# Patient Record
Sex: MALE | Race: WHITE | NOT HISPANIC OR LATINO | Employment: OTHER | URBAN - METROPOLITAN AREA
[De-identification: names, ages, dates, MRNs, and addresses within clinical notes are randomized per-mention and may not be internally consistent; named-entity substitution may affect disease eponyms.]

---

## 2019-08-14 ENCOUNTER — HOSPITAL ENCOUNTER (OUTPATIENT)
Dept: RADIOLOGY | Facility: MEDICAL CENTER | Age: 69
End: 2019-08-14

## 2019-08-14 ENCOUNTER — HOSPITAL ENCOUNTER (INPATIENT)
Facility: MEDICAL CENTER | Age: 69
LOS: 5 days | DRG: 617 | End: 2019-08-19
Attending: EMERGENCY MEDICINE | Admitting: HOSPITALIST
Payer: MEDICARE

## 2019-08-14 DIAGNOSIS — L08.9 LEFT FOOT INFECTION: ICD-10-CM

## 2019-08-14 DIAGNOSIS — L02.91 ABSCESS: ICD-10-CM

## 2019-08-14 PROCEDURE — 36415 COLL VENOUS BLD VENIPUNCTURE: CPT

## 2019-08-14 PROCEDURE — 96365 THER/PROPH/DIAG IV INF INIT: CPT

## 2019-08-14 PROCEDURE — 770006 HCHG ROOM/CARE - MED/SURG/GYN SEMI*

## 2019-08-14 PROCEDURE — 700111 HCHG RX REV CODE 636 W/ 250 OVERRIDE (IP): Performed by: EMERGENCY MEDICINE

## 2019-08-14 PROCEDURE — 99358 PROLONG SERVICE W/O CONTACT: CPT | Performed by: HOSPITALIST

## 2019-08-14 PROCEDURE — 99285 EMERGENCY DEPT VISIT HI MDM: CPT

## 2019-08-14 PROCEDURE — 700105 HCHG RX REV CODE 258: Performed by: EMERGENCY MEDICINE

## 2019-08-14 PROCEDURE — 99223 1ST HOSP IP/OBS HIGH 75: CPT | Mod: AI,25 | Performed by: HOSPITALIST

## 2019-08-14 RX ORDER — SODIUM CHLORIDE 9 MG/ML
1000 INJECTION, SOLUTION INTRAVENOUS ONCE
Status: COMPLETED | OUTPATIENT
Start: 2019-08-14 | End: 2019-08-14

## 2019-08-14 RX ADMIN — VANCOMYCIN HYDROCHLORIDE 2300 MG: 500 INJECTION, POWDER, LYOPHILIZED, FOR SOLUTION INTRAVENOUS at 23:56

## 2019-08-14 RX ADMIN — SODIUM CHLORIDE 1000 ML: 9 INJECTION, SOLUTION INTRAVENOUS at 23:55

## 2019-08-14 SDOH — HEALTH STABILITY: MENTAL HEALTH: HOW OFTEN DO YOU HAVE A DRINK CONTAINING ALCOHOL?: 4 OR MORE TIMES A WEEK

## 2019-08-14 SDOH — HEALTH STABILITY: MENTAL HEALTH: HOW MANY STANDARD DRINKS CONTAINING ALCOHOL DO YOU HAVE ON A TYPICAL DAY?: 1 OR 2

## 2019-08-15 ENCOUNTER — APPOINTMENT (OUTPATIENT)
Dept: RADIOLOGY | Facility: MEDICAL CENTER | Age: 69
DRG: 617 | End: 2019-08-15
Attending: HOSPITALIST
Payer: MEDICARE

## 2019-08-15 PROBLEM — E11.9 DIABETES TYPE 2, CONTROLLED (HCC): Status: ACTIVE | Noted: 2019-08-15

## 2019-08-15 PROBLEM — L08.9 LEFT FOOT INFECTION: Status: ACTIVE | Noted: 2019-08-15

## 2019-08-15 PROBLEM — E87.1 HYPONATREMIA: Status: ACTIVE | Noted: 2019-08-15

## 2019-08-15 PROBLEM — F10.10 ALCOHOL ABUSE: Status: ACTIVE | Noted: 2019-08-15

## 2019-08-15 PROBLEM — I10 HTN (HYPERTENSION): Status: ACTIVE | Noted: 2019-08-15

## 2019-08-15 LAB
ANION GAP SERPL CALC-SCNC: 9 MMOL/L (ref 0–11.9)
BUN SERPL-MCNC: 14 MG/DL (ref 8–22)
CALCIUM SERPL-MCNC: 8.7 MG/DL (ref 8.5–10.5)
CHLORIDE SERPL-SCNC: 89 MMOL/L (ref 96–112)
CO2 SERPL-SCNC: 29 MMOL/L (ref 20–33)
CREAT SERPL-MCNC: 0.85 MG/DL (ref 0.5–1.4)
ERYTHROCYTE [SEDIMENTATION RATE] IN BLOOD BY WESTERGREN METHOD: 57 MM/HOUR (ref 0–20)
EST. AVERAGE GLUCOSE BLD GHB EST-MCNC: 154 MG/DL
GLUCOSE BLD-MCNC: 180 MG/DL (ref 65–99)
GLUCOSE BLD-MCNC: 77 MG/DL (ref 65–99)
GLUCOSE SERPL-MCNC: 120 MG/DL (ref 65–99)
HBA1C MFR BLD: 7 % (ref 0–5.6)
OSMOLALITY SERPL: 257 MOSM/KG H2O (ref 278–298)
OSMOLALITY UR: 278 MOSM/KG H2O (ref 300–900)
POTASSIUM SERPL-SCNC: 3.4 MMOL/L (ref 3.6–5.5)
SODIUM SERPL-SCNC: 127 MMOL/L (ref 135–145)

## 2019-08-15 PROCEDURE — 80048 BASIC METABOLIC PNL TOTAL CA: CPT

## 2019-08-15 PROCEDURE — 83930 ASSAY OF BLOOD OSMOLALITY: CPT

## 2019-08-15 PROCEDURE — 83935 ASSAY OF URINE OSMOLALITY: CPT

## 2019-08-15 PROCEDURE — A9270 NON-COVERED ITEM OR SERVICE: HCPCS | Performed by: HOSPITALIST

## 2019-08-15 PROCEDURE — 99232 SBSQ HOSP IP/OBS MODERATE 35: CPT | Performed by: INTERNAL MEDICINE

## 2019-08-15 PROCEDURE — 700111 HCHG RX REV CODE 636 W/ 250 OVERRIDE (IP): Performed by: INTERNAL MEDICINE

## 2019-08-15 PROCEDURE — 36415 COLL VENOUS BLD VENIPUNCTURE: CPT

## 2019-08-15 PROCEDURE — 700105 HCHG RX REV CODE 258: Performed by: HOSPITALIST

## 2019-08-15 PROCEDURE — 700117 HCHG RX CONTRAST REV CODE 255: Performed by: INTERNAL MEDICINE

## 2019-08-15 PROCEDURE — 85652 RBC SED RATE AUTOMATED: CPT

## 2019-08-15 PROCEDURE — 700111 HCHG RX REV CODE 636 W/ 250 OVERRIDE (IP): Performed by: HOSPITALIST

## 2019-08-15 PROCEDURE — 82962 GLUCOSE BLOOD TEST: CPT

## 2019-08-15 PROCEDURE — 99223 1ST HOSP IP/OBS HIGH 75: CPT | Performed by: INTERNAL MEDICINE

## 2019-08-15 PROCEDURE — 700102 HCHG RX REV CODE 250 W/ 637 OVERRIDE(OP): Performed by: INTERNAL MEDICINE

## 2019-08-15 PROCEDURE — 700102 HCHG RX REV CODE 250 W/ 637 OVERRIDE(OP): Performed by: HOSPITALIST

## 2019-08-15 PROCEDURE — A9270 NON-COVERED ITEM OR SERVICE: HCPCS | Performed by: INTERNAL MEDICINE

## 2019-08-15 PROCEDURE — 770006 HCHG ROOM/CARE - MED/SURG/GYN SEMI*

## 2019-08-15 PROCEDURE — 700105 HCHG RX REV CODE 258: Performed by: INTERNAL MEDICINE

## 2019-08-15 PROCEDURE — 83036 HEMOGLOBIN GLYCOSYLATED A1C: CPT

## 2019-08-15 PROCEDURE — 73720 MRI LWR EXTREMITY W/O&W/DYE: CPT | Mod: LT

## 2019-08-15 RX ORDER — ACETAMINOPHEN 500 MG
1000 TABLET ORAL EVERY 6 HOURS
Status: DISCONTINUED | OUTPATIENT
Start: 2019-08-15 | End: 2019-08-19 | Stop reason: HOSPADM

## 2019-08-15 RX ORDER — LORAZEPAM 1 MG/1
3 TABLET ORAL
Status: DISCONTINUED | OUTPATIENT
Start: 2019-08-15 | End: 2019-08-17

## 2019-08-15 RX ORDER — LORAZEPAM 2 MG/ML
2 INJECTION INTRAMUSCULAR
Status: DISCONTINUED | OUTPATIENT
Start: 2019-08-15 | End: 2019-08-17

## 2019-08-15 RX ORDER — AMOXICILLIN 250 MG
2 CAPSULE ORAL 2 TIMES DAILY
Status: DISCONTINUED | OUTPATIENT
Start: 2019-08-15 | End: 2019-08-19 | Stop reason: HOSPADM

## 2019-08-15 RX ORDER — BISACODYL 10 MG
10 SUPPOSITORY, RECTAL RECTAL
Status: DISCONTINUED | OUTPATIENT
Start: 2019-08-15 | End: 2019-08-19 | Stop reason: HOSPADM

## 2019-08-15 RX ORDER — OXYCODONE HYDROCHLORIDE 5 MG/1
5 TABLET ORAL
Status: DISCONTINUED | OUTPATIENT
Start: 2019-08-15 | End: 2019-08-19 | Stop reason: HOSPADM

## 2019-08-15 RX ORDER — TRIAMTERENE CAPSULES 50 MG/1
50 CAPSULE ORAL DAILY
Status: DISCONTINUED | OUTPATIENT
Start: 2019-08-15 | End: 2019-08-15

## 2019-08-15 RX ORDER — OXYCODONE HYDROCHLORIDE 10 MG/1
10 TABLET ORAL
Status: DISCONTINUED | OUTPATIENT
Start: 2019-08-15 | End: 2019-08-19 | Stop reason: HOSPADM

## 2019-08-15 RX ORDER — LORAZEPAM 2 MG/ML
0.5 INJECTION INTRAMUSCULAR EVERY 4 HOURS PRN
Status: DISCONTINUED | OUTPATIENT
Start: 2019-08-15 | End: 2019-08-17

## 2019-08-15 RX ORDER — ONDANSETRON 4 MG/1
4 TABLET, ORALLY DISINTEGRATING ORAL EVERY 4 HOURS PRN
Status: DISCONTINUED | OUTPATIENT
Start: 2019-08-15 | End: 2019-08-19 | Stop reason: HOSPADM

## 2019-08-15 RX ORDER — LORAZEPAM 2 MG/ML
1 INJECTION INTRAMUSCULAR
Status: DISCONTINUED | OUTPATIENT
Start: 2019-08-15 | End: 2019-08-17

## 2019-08-15 RX ORDER — LORAZEPAM 2 MG/ML
1.5 INJECTION INTRAMUSCULAR
Status: DISCONTINUED | OUTPATIENT
Start: 2019-08-15 | End: 2019-08-17

## 2019-08-15 RX ORDER — TRIAMTERENE CAPSULES 50 MG/1
50 CAPSULE ORAL DAILY
Status: ON HOLD | COMMUNITY
End: 2019-08-19

## 2019-08-15 RX ORDER — MORPHINE SULFATE 4 MG/ML
4 INJECTION, SOLUTION INTRAMUSCULAR; INTRAVENOUS
Status: DISCONTINUED | OUTPATIENT
Start: 2019-08-15 | End: 2019-08-19 | Stop reason: HOSPADM

## 2019-08-15 RX ORDER — LORAZEPAM 0.5 MG/1
0.5 TABLET ORAL EVERY 4 HOURS PRN
Status: DISCONTINUED | OUTPATIENT
Start: 2019-08-15 | End: 2019-08-17

## 2019-08-15 RX ORDER — FOLIC ACID 1 MG/1
1 TABLET ORAL DAILY
Status: DISPENSED | OUTPATIENT
Start: 2019-08-15 | End: 2019-08-19

## 2019-08-15 RX ORDER — SODIUM CHLORIDE 9 MG/ML
2000 INJECTION, SOLUTION INTRAVENOUS ONCE
Status: DISCONTINUED | OUTPATIENT
Start: 2019-08-15 | End: 2019-08-15

## 2019-08-15 RX ORDER — HEPARIN SODIUM 5000 [USP'U]/ML
5000 INJECTION, SOLUTION INTRAVENOUS; SUBCUTANEOUS EVERY 8 HOURS
Status: DISCONTINUED | OUTPATIENT
Start: 2019-08-15 | End: 2019-08-19 | Stop reason: HOSPADM

## 2019-08-15 RX ORDER — POLYETHYLENE GLYCOL 3350 17 G/17G
1 POWDER, FOR SOLUTION ORAL
Status: DISCONTINUED | OUTPATIENT
Start: 2019-08-15 | End: 2019-08-19 | Stop reason: HOSPADM

## 2019-08-15 RX ORDER — SODIUM CHLORIDE 9 MG/ML
500 INJECTION, SOLUTION INTRAVENOUS
Status: DISCONTINUED | OUTPATIENT
Start: 2019-08-15 | End: 2019-08-19 | Stop reason: HOSPADM

## 2019-08-15 RX ORDER — THIAMINE MONONITRATE (VIT B1) 100 MG
100 TABLET ORAL DAILY
Status: DISPENSED | OUTPATIENT
Start: 2019-08-15 | End: 2019-08-19

## 2019-08-15 RX ORDER — ONDANSETRON 2 MG/ML
4 INJECTION INTRAMUSCULAR; INTRAVENOUS EVERY 4 HOURS PRN
Status: DISCONTINUED | OUTPATIENT
Start: 2019-08-15 | End: 2019-08-19 | Stop reason: HOSPADM

## 2019-08-15 RX ORDER — LORAZEPAM 1 MG/1
2 TABLET ORAL
Status: DISCONTINUED | OUTPATIENT
Start: 2019-08-15 | End: 2019-08-17

## 2019-08-15 RX ORDER — LORAZEPAM 1 MG/1
1 TABLET ORAL EVERY 4 HOURS PRN
Status: DISCONTINUED | OUTPATIENT
Start: 2019-08-15 | End: 2019-08-17

## 2019-08-15 RX ORDER — LORAZEPAM 1 MG/1
4 TABLET ORAL
Status: DISCONTINUED | OUTPATIENT
Start: 2019-08-15 | End: 2019-08-17

## 2019-08-15 RX ORDER — SODIUM CHLORIDE AND POTASSIUM CHLORIDE 300; 900 MG/100ML; MG/100ML
INJECTION, SOLUTION INTRAVENOUS CONTINUOUS
Status: DISCONTINUED | OUTPATIENT
Start: 2019-08-15 | End: 2019-08-15

## 2019-08-15 RX ADMIN — HEPARIN SODIUM 5000 UNITS: 5000 INJECTION, SOLUTION INTRAVENOUS; SUBCUTANEOUS at 22:42

## 2019-08-15 RX ADMIN — AMPICILLIN SODIUM AND SULBACTAM SODIUM 3 G: 2; 1 INJECTION, POWDER, FOR SOLUTION INTRAMUSCULAR; INTRAVENOUS at 04:54

## 2019-08-15 RX ADMIN — FOLIC ACID 1 MG: 1 TABLET ORAL at 12:49

## 2019-08-15 RX ADMIN — HEPARIN SODIUM 5000 UNITS: 5000 INJECTION, SOLUTION INTRAVENOUS; SUBCUTANEOUS at 04:55

## 2019-08-15 RX ADMIN — HEPARIN SODIUM 5000 UNITS: 5000 INJECTION, SOLUTION INTRAVENOUS; SUBCUTANEOUS at 12:49

## 2019-08-15 RX ADMIN — GADOTERIDOL 20 ML: 279.3 INJECTION, SOLUTION INTRAVENOUS at 10:43

## 2019-08-15 RX ADMIN — Medication 100 MG: at 12:49

## 2019-08-15 RX ADMIN — ACETAMINOPHEN 1000 MG: 500 TABLET ORAL at 22:42

## 2019-08-15 RX ADMIN — ACETAMINOPHEN 1000 MG: 500 TABLET ORAL at 12:49

## 2019-08-15 RX ADMIN — INSULIN HUMAN 1 UNITS: 100 INJECTION, SOLUTION PARENTERAL at 22:44

## 2019-08-15 RX ADMIN — VANCOMYCIN HYDROCHLORIDE 1400 MG: 500 INJECTION, POWDER, LYOPHILIZED, FOR SOLUTION INTRAVENOUS at 18:33

## 2019-08-15 RX ADMIN — AMPICILLIN SODIUM AND SULBACTAM SODIUM 3 G: 2; 1 INJECTION, POWDER, FOR SOLUTION INTRAMUSCULAR; INTRAVENOUS at 22:42

## 2019-08-15 RX ADMIN — AMPICILLIN SODIUM AND SULBACTAM SODIUM 3 G: 2; 1 INJECTION, POWDER, FOR SOLUTION INTRAMUSCULAR; INTRAVENOUS at 17:50

## 2019-08-15 RX ADMIN — TRIAMTERENE 50 MG: 50 CAPSULE ORAL at 04:55

## 2019-08-15 RX ADMIN — ACETAMINOPHEN 1000 MG: 500 TABLET ORAL at 17:49

## 2019-08-15 RX ADMIN — AMPICILLIN SODIUM AND SULBACTAM SODIUM 3 G: 2; 1 INJECTION, POWDER, FOR SOLUTION INTRAMUSCULAR; INTRAVENOUS at 14:10

## 2019-08-15 RX ADMIN — THERA TABS 1 TABLET: TAB at 12:49

## 2019-08-15 ASSESSMENT — LIFESTYLE VARIABLES
CONSUMPTION TOTAL: POSITIVE
NAUSEA AND VOMITING: NO NAUSEA AND NO VOMITING
TOTAL SCORE: 1
HAVE PEOPLE ANNOYED YOU BY CRITICIZING YOUR DRINKING: NO
AUDITORY DISTURBANCES: NOT PRESENT
PAROXYSMAL SWEATS: NO SWEAT VISIBLE
SUBSTANCE_ABUSE: 0
ORIENTATION AND CLOUDING OF SENSORIUM: ORIENTED AND CAN DO SERIAL ADDITIONS
TREMOR: NO TREMOR
TREMOR: *
TOTAL SCORE: 0
AGITATION: NORMAL ACTIVITY
AUDITORY DISTURBANCES: NOT PRESENT
EVER FELT BAD OR GUILTY ABOUT YOUR DRINKING: NO
EVER_SMOKED: NEVER
AGITATION: NORMAL ACTIVITY
HOW MANY TIMES IN THE PAST YEAR HAVE YOU HAD 5 OR MORE DRINKS IN A DAY: 300
DOES PATIENT WANT TO STOP DRINKING: NO
PAROXYSMAL SWEATS: NO SWEAT VISIBLE
EVER HAD A DRINK FIRST THING IN THE MORNING TO STEADY YOUR NERVES TO GET RID OF A HANGOVER: NO
TREMOR: NO TREMOR
NAUSEA AND VOMITING: NO NAUSEA AND NO VOMITING
HEADACHE, FULLNESS IN HEAD: NOT PRESENT
NAUSEA AND VOMITING: NO NAUSEA AND NO VOMITING
ANXIETY: NO ANXIETY (AT EASE)
ALCOHOL_USE: YES
ANXIETY: NO ANXIETY (AT EASE)
AGITATION: NORMAL ACTIVITY
ANXIETY: NO ANXIETY (AT EASE)
HAVE YOU EVER FELT YOU SHOULD CUT DOWN ON YOUR DRINKING: YES
VISUAL DISTURBANCES: NOT PRESENT
HEADACHE, FULLNESS IN HEAD: NOT PRESENT
HEADACHE, FULLNESS IN HEAD: NOT PRESENT
PAROXYSMAL SWEATS: NO SWEAT VISIBLE
TOTAL SCORE: 3
ORIENTATION AND CLOUDING OF SENSORIUM: ORIENTED AND CAN DO SERIAL ADDITIONS
TOTAL SCORE: 0
ON A TYPICAL DAY WHEN YOU DRINK ALCOHOL HOW MANY DRINKS DO YOU HAVE: 10
ORIENTATION AND CLOUDING OF SENSORIUM: ORIENTED AND CAN DO SERIAL ADDITIONS
AUDITORY DISTURBANCES: NOT PRESENT
VISUAL DISTURBANCES: NOT PRESENT
AVERAGE NUMBER OF DAYS PER WEEK YOU HAVE A DRINK CONTAINING ALCOHOL: 7
VISUAL DISTURBANCES: NOT PRESENT
TOTAL SCORE: 1
TOTAL SCORE: 1

## 2019-08-15 ASSESSMENT — ENCOUNTER SYMPTOMS
NECK PAIN: 0
HEARTBURN: 0
TREMORS: 0
PALPITATIONS: 0
MYALGIAS: 1
BLURRED VISION: 0
FEVER: 0
SHORTNESS OF BREATH: 0
BRUISES/BLEEDS EASILY: 0
DIARRHEA: 0
HALLUCINATIONS: 0
DEPRESSION: 0
FLANK PAIN: 0
SPEECH CHANGE: 0
CHILLS: 0
VOMITING: 0
WHEEZING: 0
HEADACHES: 0
TREMORS: 1
DIZZINESS: 0
FOCAL WEAKNESS: 0
ORTHOPNEA: 0
SENSORY CHANGE: 1
BACK PAIN: 0
HEMOPTYSIS: 0
SPUTUM PRODUCTION: 0
MYALGIAS: 0
DOUBLE VISION: 0
WEIGHT LOSS: 0
NERVOUS/ANXIOUS: 0
ABDOMINAL PAIN: 0
COUGH: 0
SORE THROAT: 0
PHOTOPHOBIA: 0
TINGLING: 0
NAUSEA: 0
POLYDIPSIA: 0

## 2019-08-15 ASSESSMENT — COGNITIVE AND FUNCTIONAL STATUS - GENERAL
DAILY ACTIVITIY SCORE: 24
SUGGESTED CMS G CODE MODIFIER DAILY ACTIVITY: CH
SUGGESTED CMS G CODE MODIFIER MOBILITY: CJ
CLIMB 3 TO 5 STEPS WITH RAILING: A LITTLE
MOBILITY SCORE: 22
WALKING IN HOSPITAL ROOM: A LITTLE

## 2019-08-15 ASSESSMENT — PATIENT HEALTH QUESTIONNAIRE - PHQ9
2. FEELING DOWN, DEPRESSED, IRRITABLE, OR HOPELESS: NOT AT ALL
SUM OF ALL RESPONSES TO PHQ9 QUESTIONS 1 AND 2: 0
1. LITTLE INTEREST OR PLEASURE IN DOING THINGS: NOT AT ALL

## 2019-08-15 NOTE — CONSULTS
INFECTIOUS DISEASES INPATIENT CONSULT NOTE     Date of Service: 8/15/2019    Consult Requested By: Sunil Eagle M.D.    Reason for Consultation: Left foot infection    History of Present Illness:   Abdullahi Huynh is a 68 y.o. man with a history of gout, chronic hyponatremia, hypertension and ongoing alcohol abuse with lower extremity neuropathy admitted 8/14/2019 for worsening left foot edema and erythema.  Approximately 2 months prior to admission, patient stubbed his left foot in the dark.  At that time he did not seek any medical attention.  Over the last 2 weeks however, he noted development of blisters on his left great and second toe associated with increasing edema and erythema.  He also noted a fever of 101 prior to admission.  Eventually the blisters did open and drain foul-smelling fluid.  He has been treating his foot wounds with Neosporin ointment and hydrogen peroxide.  He did see his primary care physician secondary to his foot wounds.  He was prescribed narcotic medications and oral antibiotics but for unclear reasons did not have the antibiotics filled.  Given lack of improvement, he presented to the ED at a local hospital due to concerns for worsening infection.  X-ray at the [facility] revealed a dislocated middle phalanx of the second left toe but no evidence of osteomyelitis or gas formation.  He was transferred to [facility] for higher level of care and orthopedic surgery evaluation.  Of note, patient history of alcohol abuse greater than 20 years.  He admits to drinking 10-12 beers daily with last drink earlier on the day of admission.  He denies any prior alcohol withdrawals.  Suspect his neuropathy is secondary to long-standing alcohol abuse.  On presentation, he was afebrile.  Labs were not obtained.  He was started on broad-spectrum antibiotics.  MRI has been performed however final report is pending.  Infectious disease service consulted for further antibiotic  recommendations and management.      Review of Systems   Constitutional: Negative for chills and fever.   Respiratory: Negative for cough and shortness of breath.    Cardiovascular: Positive for leg swelling. Negative for chest pain.   Gastrointestinal: Negative for abdominal pain, diarrhea, nausea and vomiting.   Musculoskeletal: Positive for myalgias.   Neurological: Positive for tremors and sensory change.   All other systems reviewed and are negative.      PMH:   Past Medical History:   Diagnosis Date   • Hypertension    Alcohol abuse  Peripheral neuropathy  Gout    PSH:  Arthroscopic surgery of the knee    FAMILY HX:  Positive for alcohol abuse    SOCIAL HX:  Social History     Socioeconomic History   • Marital status:      Spouse name: Not on file   • Number of children: Not on file   • Years of education: Not on file   • Highest education level: Not on file   Occupational History   • Not on file   Social Needs   • Financial resource strain: Not on file   • Food insecurity:     Worry: Not on file     Inability: Not on file   • Transportation needs:     Medical: Not on file     Non-medical: Not on file   Tobacco Use   • Smoking status: Never Smoker   • Smokeless tobacco: Never Used   Substance and Sexual Activity   • Alcohol use: Yes     Frequency: 4 or more times a week     Drinks per session: 1 or 2   • Drug use: Never   • Sexual activity: Not on file   Lifestyle   • Physical activity:     Days per week: Not on file     Minutes per session: Not on file   • Stress: Not on file   Relationships   • Social connections:     Talks on phone: Not on file     Gets together: Not on file     Attends Muslim service: Not on file     Active member of club or organization: Not on file     Attends meetings of clubs or organizations: Not on file     Relationship status: Not on file   • Intimate partner violence:     Fear of current or ex partner: Not on file     Emotionally abused: Not on file     Physically abused:  Not on file     Forced sexual activity: Not on file   Other Topics Concern   • Not on file   Social History Narrative   • Not on file     Social History     Tobacco Use   Smoking Status Never Smoker   Smokeless Tobacco Never Used     Social History     Substance and Sexual Activity   Alcohol Use Yes   • Frequency: 4 or more times a week   • Drinks per session: 1 or 2       Allergies/Intolerances:  No Known Allergies    History reviewed with the patient    Other Current Medications:    Current Facility-Administered Medications:   •  senna-docusate (PERICOLACE or SENOKOT S) 8.6-50 MG per tablet 2 Tab, 2 Tab, Oral, BID **AND** polyethylene glycol/lytes (MIRALAX) PACKET 1 Packet, 1 Packet, Oral, QDAY PRN **AND** magnesium hydroxide (MILK OF MAGNESIA) suspension 30 mL, 30 mL, Oral, QDAY PRN **AND** bisacodyl (DULCOLAX) suppository 10 mg, 10 mg, Rectal, QDAY PRN, Nurys William M.D.  •  heparin injection 5,000 Units, 5,000 Units, Subcutaneous, Q8HRS, Nurys William M.D., 5,000 Units at 08/15/19 0455  •  ondansetron (ZOFRAN) syringe/vial injection 4 mg, 4 mg, Intravenous, Q4HRS PRN, Nurys William M.D.  •  ondansetron (ZOFRAN ODT) dispertab 4 mg, 4 mg, Oral, Q4HRS PRN, Nurys William M.D.  •  NS (BOLUS) infusion 500 mL, 500 mL, Intravenous, Once PRN, Nurys William M.D.  •  ampicillin/sulbactam (UNASYN) 3 g in  mL IVPB, 3 g, Intravenous, Q6HRS, Nurys William M.D., Stopped at 08/15/19 0524  •  MD Alert...Vancomycin per Pharmacy, 1 Each, Other, PHARMACY TO DOSE, Nurys William M.D.  •  vancomycin (VANCOCIN) 1,400 mg in  mL IVPB, 15 mg/kg, Intravenous, Q24HR, Nurys William M.D.  •  Pharmacy Consult Request ...Pain Management Review 1 Each, 1 Each, Other, PHARMACY TO DOSE, Sunil Eagle M.D.  •  acetaminophen (TYLENOL) tablet 1,000 mg, 1,000 mg, Oral, Q6HRS, Sunil Eagle M.D.  •  oxyCODONE immediate release (ROXICODONE) tablet 10 mg, 10 mg, Oral, Q3HRS PRN, Sunil Eagle M.D.  •  morphine  "(pf) 4 mg/ml injection 4 mg, 4 mg, Intravenous, Q3HRS PRN, Sunil Eagle M.D.  •  oxyCODONE immediate-release (ROXICODONE) tablet 5 mg, 5 mg, Oral, Q3HRS PRN, Sunil Eagle M.D.  •  thiamine tablet 100 mg, 100 mg, Oral, DAILY **AND** multivitamin (THERAGRAN) tablet 1 Tab, 1 Tab, Oral, DAILY **AND** folic acid (FOLVITE) tablet 1 mg, 1 mg, Oral, DAILY, Sunil Eagle M.D.  •  LORazepam (ATIVAN) tablet 0.5 mg, 0.5 mg, Oral, Q4HRS PRN, Sunil Eagle M.D.  •  LORazepam (ATIVAN) tablet 1 mg, 1 mg, Oral, Q4HRS PRN **OR** LORazepam (ATIVAN) injection 0.5 mg, 0.5 mg, Intravenous, Q4HRS PRN, Sunil Eagle M.D.  •  LORazepam (ATIVAN) tablet 2 mg, 2 mg, Oral, Q2HRS PRN **OR** LORazepam (ATIVAN) injection 1 mg, 1 mg, Intravenous, Q2HRS PRN, Sunil Egale M.D.  •  LORazepam (ATIVAN) tablet 3 mg, 3 mg, Oral, Q HOUR PRN **OR** LORazepam (ATIVAN) injection 1.5 mg, 1.5 mg, Intravenous, Q HOUR PRN, Sunil Eagle M.D.  •  LORazepam (ATIVAN) tablet 4 mg, 4 mg, Oral, Q15 MIN PRN **OR** LORazepam (ATIVAN) injection 2 mg, 2 mg, Intravenous, Q15 MIN PRN, Sunil Eagle M.D.  [unfilled]    Most Recent Vital Signs:  /65   Pulse 76   Temp 37.2 °C (99 °F) (Temporal)   Resp 18   Ht 1.88 m (6' 2\")   Wt 90.7 kg (200 lb)   SpO2 97%   BMI 25.68 kg/m²   Temp  Av.9 °C (98.5 °F)  Min: 36.4 °C (97.5 °F)  Max: 37.2 °C (99 °F)    Physical Exam:  General: Older than stated age, well nourished, diaphoretic, well-appearing, no acute distress  HEENT: sclera anicteric, PERRL, extraocular muscles intact, mucous membranes moist, oropharynx clear and moist, no oral lesions or exudate  Neck: supple, no lymphadenopathy  Chest: CTAB, no rales, rhonchi or wheezes, normal work of breathing.  Cardiac: regular rate and rhythm, normal S1 S2, no murmurs, rubs or gallops  Abdomen: + bowel sounds, soft, non-tender, non-distended, no hepatosplenomegaly  Extremities: Left foot edema extending proximally surrounded by " erythema.  There is a ulcer in the webspace between first and second toes of the left foot.  Left second toe is edematous with the presence of an ulcer on the medial aspect. + Scant foul-smelling drainage  Skin: See above  Neuro: Alert and oriented times 3, non-focal exam, speech fluent, full range of motion to bilateral upper and lower extremities; mild bilateral hand tremors  Psych: normal mood and behavior, pleasant; memory intact, normal judgement    Pertinent Lab Results:  No results for input(s): WBC, HGB in the last 72 hours.    Invalid input(s): PLATELET, ABSOLUTENEUT           Recent Labs     08/15/19  0555   SODIUM 127*   POTASSIUM 3.4*   CHLORIDE 89*   CO2 29   CREATININE 0.85        No results for input(s): ALBUMIN in the last 72 hours.    Invalid input(s): AST, ALT, ALKPHOS, BILITOT, TOTALBILIRUB, BILIRUBINTOT, BILIRUBINDIR, BILIRUBININD, ALKALINEPHOS     Pertinent Micro:  Results     ** No results found for the last 168 hours. **        No results found for: BLOODCULTU, BLDCULT, BCHOLD     Studies:  No results found.    IMPRESSION:   1.  Left foot infection    2.  Left lower extremity cellulitis  3.  Type 2 diabetes mellitus, newly diagnosed  4.  Peripheral neuropathy  5.  Alcohol abuse      PLAN:   Abdullahi Huynh is a 68 y.o. male with a history of long-standing alcohol abuse complicated by peripheral neuropathy and newly diagnosed type 2 diabetes mellitus admitted for nonhealing wounds of the left foot and worsening edema and erythema of the left lower extremity.  Patient has clinical manifestations consistent with soft tissue infection of the left lower extremity.  Patient states that a wound culture was obtained at the outside hospital.  Please obtain these cultures to help guide antibiotic therapy.  MRI has been performed to rule out underlying osteomyelitis.  Check ESR. Agree with vancomycin and Unasyn for now.  Monitor renal function and Vanco trough.  Patient had no prior diagnosis of  type 2 diabetes.  Will need diabetes education.  Control blood sugars to help control current infection and promote wound healing.  Will likely need evaluation from the limb preservation service.  Will defer need for surgical intervention to LPS.  Further recommendations per MRI and culture results.      Plan of care discussed with PARISA Eagle M.D.. Will continue to follow    Luh Singh M.D.      Please note that this dictation was created using voice recognition software. I have worked with technical experts from [facility] to optimize the interface.  I have made every reasonable attempt to correct obvious errors, but there may be errors of grammar and possibly content that I did not discover before finalizing the note.

## 2019-08-15 NOTE — PROGRESS NOTES
2 RN skin check complete светлана   Devices in place iv.  Skin assessed under devices yes  Confirmed pressure ulcers found on left between 2nd toe.  New potential pressure ulcers noted on yes Wound consult placed yes    The following interventions in place cleaned with NS , foam between the toes and wrapped with kerlix  Arm /hand bruising noted

## 2019-08-15 NOTE — THERAPY
PT eval order received. Pending LPS consult to determine POC and precautions in regard to foot. Will defer eval until this has been established.

## 2019-08-15 NOTE — CARE PLAN
Problem: Bowel/Gastric:  Goal: Will not experience complications related to bowel motility  Outcome: PROGRESSING AS EXPECTED  Intervention: Assess baseline bowel pattern  Note:   Pt states his bowel movements have not changed. Pt states that he goes daily.      Problem: Knowledge Deficit  Goal: Knowledge of disease process/condition, treatment plan, diagnostic tests, and medications will improve  Outcome: PROGRESSING AS EXPECTED  Intervention: Assess knowledge level of disease process/condition, treatment plan, diagnostic tests, and medications  Note:   Pt is aware of disease process and the plan to develop a treatment plan.      Problem: Discharge Barriers/Planning  Goal: Patient's continuum of care needs will be met  Outcome: PROGRESSING AS EXPECTED

## 2019-08-15 NOTE — ED TRIAGE NOTES
Pt BIB [facility] as transfer from [place]. Pt presented to the ED with complaints of tow pain after stubbing his toe a few weeks ago.Pt states that he went to his PCP yesterday for increase in swelling, Pt states he was prescribed an antibiotic but pharmacy was unable to fill. Pt states that this morning he noticed that the sweeling had increased substantially and he had the addition of fever of 101.5. Per [facility] pt had lab work done at outside facility along with onw dose of 2g Rocephin. Pt denies any medical hx.

## 2019-08-15 NOTE — ED NOTES
Med rec updated and complete. Allergies reviewed.  Pt denies antibiotic use in last 14 days.  Home pharmacy [facility].

## 2019-08-15 NOTE — H&P
Hospital Medicine History & Physical Note    Date of Service  8/14/2019    Primary Care Physician  Jose Batista M.D.    Consultants  Pending    Code Status  Full    Chief Complaint  Chief Complaint   Patient presents with   • Toe Pain   • Wound Infection       History of Presenting Illness  68 y.o. male who presented on 8/14/2019 in transfer from outside facility with left foot and toe wound.  The patient presented to an outside hospital with complaints of a left second toe and foot infection.  He states that the toe has been red and swollen for several weeks but worsened in the past night and was associated with fatigue.  He was seen by his primary care provider who started him on oral antibiotic therapy but he apparently did not get this filled.  He states that all of his issues began at least 2 months ago when he stubbed his toe but did not seek immediate medical assistance.  Approximately 2 weeks ago, he developed blisters and drainage around that area.  He reports a history of hypertension and consumes up to a 12 pack of beer per day.  He also carries a history of gout and chronic hyponatremia.    At the outside facility, work-up including WBC 15.8 hemoglobin 12.3 hematocrit 35.7 platelet 263 sodium 11 potassium 3.3 chloride 78 CO2 24 BUN 22 Prandin 1.2 glucose 174 CRP 18.7 ESR 66.  Plain film of the left toes showed a middle phalanx of the second toe which is dislocated dorsally, no obvious fracture or specific evidence of osteomyelitis.  No gas formation.  He was transferred to our facility for higher level of care and specialist consultation.    Review of Systems  Review of Systems   Constitutional: Negative for chills and fever.   HENT: Negative for congestion and sore throat.    Eyes: Negative for photophobia.   Respiratory: Negative for cough, shortness of breath and wheezing.    Cardiovascular: Negative for chest pain and palpitations.   Gastrointestinal: Negative for abdominal pain, diarrhea, nausea  and vomiting.   Genitourinary: Negative for dysuria.   Musculoskeletal: Negative for myalgias.   Skin: Negative.         Left foot, second toe and left shin redness and swelling.   Neurological: Negative for dizziness, tingling, focal weakness and headaches.   Psychiatric/Behavioral: Negative for depression and suicidal ideas.       Past Medical History  Past Medical History:   Diagnosis Date   • Hypertension        Surgical History  Patient denies    Family History  History reviewed. No pertinent family history.    Social History  Social History     Tobacco Use   • Smoking status: Never Smoker   • Smokeless tobacco: Never Used   Substance Use Topics   • Alcohol use: Yes     Frequency: 4 or more times a week     Drinks per session: 1 or 2   • Drug use: Never       Allergies  Not on File    Medications  No current facility-administered medications on file prior to encounter.      No current outpatient medications on file prior to encounter.       Physical Exam  Hemodynamics  Temp (24hrs), Av.2 °C (98.9 °F), Min:37.2 °C (98.9 °F), Max:37.2 °C (98.9 °F)   Temperature: 37.2 °C (98.9 °F)  Pulse  Av.5  Min: 89  Max: 110    Blood Pressure : 139/77      Respiratory      Respiration: 14, Pulse Oximetry: 97 %             Physical Exam   Constitutional: He is oriented to person, place, and time. No distress.   HENT:   Head: Normocephalic and atraumatic.   Right Ear: External ear normal.   Left Ear: External ear normal.   Eyes: EOM are normal. Right eye exhibits no discharge. Left eye exhibits no discharge.   Neck: Neck supple. No JVD present.   Cardiovascular: Normal rate, regular rhythm and normal heart sounds.   Pulmonary/Chest: Effort normal and breath sounds normal. No respiratory distress. He exhibits no tenderness.   Abdominal: Soft. Bowel sounds are normal. He exhibits no distension. There is no tenderness.   Musculoskeletal: He exhibits no edema.   Neurological: He is alert and oriented to person, place, and  time. No cranial nerve deficit.   Skin: Skin is dry. He is not diaphoretic. No erythema.   Patient's left second toe is swollen, skin is sloughing and macerated.  He has overlying erythema and swelling on the dorsum of his left foot which extends to the left anterior shin.   Psychiatric: He has a normal mood and affect. His behavior is normal.   Nursing note and vitals reviewed.    Capillary refill less than 3 seconds, distal pulses intact    Laboratory:          No results for input(s): ALTSGPT, ASTSGOT, ALKPHOSPHAT, TBILIRUBIN, DBILIRUBIN, GAMMAGT, AMYLASE, LIPASE, ALB, PREALBUMIN, GLUCOSE in the last 72 hours.              No results found for: TROPONINI    Imaging  No results found.      Assessment/Plan:  Anticipate that patient will need greater than 2 midnights for management of the discussed medical issues.    * Left foot infection  Assessment & Plan  Patient has clear evidence of overlying cellulitis of the left anterior shin as well as the dorsum of the left foot.  The second left toe is also swollen and erythematous.  Plain film from the outer facility shows no observable fracture and indicates that there is no evidence of osteomyelitis or gas formation, however given the chronicity of his injury, underlying osteomyelitis is of concern.  We will check an MRI of the left foot for better visualization.  I have requested a limb preservation service consultation and I have started him on both vancomycin and Unasyn for empiric antibiotic treatment.  In the morning, we will plan to consult orthopedic surgery as well as infectious disease.    Alcohol abuse  Assessment & Plan  The patient reports drinking up to a 12 pack of beer per day, he states that he was admitted to an outside hospital last month and it did not drink for 3 days straight without any evidence of alcohol withdrawal.  At present, he is stable, no tremors, and asymptomatic from the standpoint with stable vital signs.  I have offered him beer with  all of his meals as he states that he is not ready to quit.  However he has declined this, we will continue to monitor closely for any evidence of withdrawal.    Hyponatremia  Assessment & Plan  By report, this is a chronic issue for this patient.  His mental status is stable.  He does drink a 12 pack of beer per day therefore I suspect this is underlying beer potomania.  I will check serum and urine osmolality levels.    HTN (hypertension)  Assessment & Plan  Resume home Dyazide.      Prophylaxis: Heparin for DVT prophylaxis, no PPI indicated, bowel protocol as needed    I spent a total of 35 minutes of non face to face time performing additional research, reviewing medical records from transferring facility, discussing plan of care with other healthcare providers. Start time: 11:20PM. End time: 11:55PM.

## 2019-08-15 NOTE — ASSESSMENT & PLAN NOTE
The patient reports drinking up to a 12 pack of beer per day, history of alcohol withdrawal  Start on CIWA protocol  Monitor for withdrawal, no evidence of withdrawal  Vitamins  Fall, aspiration, seizure precautions

## 2019-08-15 NOTE — ASSESSMENT & PLAN NOTE
Acute on chronic.  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  Also potassium sparing diuretics contributes  Sodium increased from 115-127 in the course of 24 hours his IV fluids  Asymptomatic  Monitor  8/16 sodium 133  8/17 stable  8/18 mild stable asymptomatic

## 2019-08-15 NOTE — PROGRESS NOTES
"Pharmacy Kinetics 68 y.o. male on vancomycin day # 1 8/15/2019    New vancomycin start  Provider specified end date: 6 weeks    Indication for Treatment: osteomyelitis    Pertinent history per medical record: Admitted on 2019 for osteomyelitis work up. Patient stubbed toe two months ago. Did not seek treatment at time. Now has abscess and imaging concerning for osteomyelitis. Sent from OSH for evaluation. Received 2g ceftriaxone prior to arrival.     Other antibiotics: unasyn 3g IV q6h    Allergies: Patient has no known allergies.     List concerns for renal function: BUN:SCr > 20:1, electrolyte derangements    Pertinent cultures to date:   Unknown collection at OSH.   No cultures collected here.     MRSA nares swab if pneumonia is a concern: n/a    No results for input(s): WBC, NEUTSPOLYS, BANDSSTABS in the last 72 hours.  No results for input(s): BUN, CREATININE, ALBUMIN in the last 72 hours.  No results for input(s): VANCOTROUGH, VANCOPEAK, VANCORANDOM in the last 72 hours.No intake or output data in the 24 hours ending 08/15/19 0107   /73   Pulse 80   Temp 37.2 °C (98.9 °F) (Temporal)   Resp 19   Ht 1.88 m (6' 2\")   Wt 90.7 kg (200 lb)   SpO2 97%  Temp (24hrs), Av.2 °C (98.9 °F), Min:37.2 °C (98.9 °F), Max:37.2 °C (98.9 °F)      A/P   1. Vancomycin dose change: start 15 mg/kg (1400 mg) IV q24h  2. Next vancomycin level: 2 days (not ordered)  3. Goal trough: 16-20 mcg/mL  4. Comments: Empiric dosing for osteomyelitis. Recommend orthopedics consult for source control. Recommend following for improvement in renal function, may require BID dosing to achieve concentrations if renal indices improve. Pharmacy will follow for possible de-escalation.     Panda Joseph, PharmD        "

## 2019-08-15 NOTE — PROGRESS NOTES
Admit from the ER via gurney. Alert x4 but [place], built in bed alarm placed for low fall risk but balance is sometimes difficult due to his left 2nd toe; red/edema,open wounds, slight weeping- await MRI. [place] to room, call light within reach and visual checks through the night

## 2019-08-15 NOTE — ASSESSMENT & PLAN NOTE
Resume home Dyazide.  Will discontinue triamterene due to tendency to hyponatremia  Blood pressure is stable

## 2019-08-15 NOTE — ASSESSMENT & PLAN NOTE
Started on Unasyn and vancomycin, will continue  ID consulted, appreciate recommendation  Will need to follow with wound culture report from outside facility  MRI of the left foot showed osteomyelitis and abscess of the left second toe  Orthopedic surgery consulted  8/16 amputation of second toe  Follow-up with surgical culture  Continue antibiotics per ID  8/18 status post I&D and wound closure  Surgical culture growing staph epidermidis.  Patient is to heel weightbearing on the left foot.  Might need assistive devices  for safe discharge?  Will order PT OT

## 2019-08-15 NOTE — ED PROVIDER NOTES
ED Provider Note    CHIEF COMPLAINT  Chief Complaint   Patient presents with   • Toe Pain   • Wound Infection       HPI  Abdullahi Huynh is a 68 y.o. male who presents with a fever and toe pain.  The patient states he stubbed his left second toe approximately 2 months ago.  He states he did not think anything of the injury did not seek medical care.  About 2 weeks ago the patient noticed a blister in the distal aspect of the left second toe.  Subsequently this opened up and started draining fluid.  Over the last 24 to 48 hours he started developing a fever as well as some erythema expanding from the toe up into the foot as well as the ankle.  The patient presented to the emerge department in [facility] and was transferred here emergently as the patient was found to have a dislocation of the DIP joint of the left second toe as well as significant cellulitis.  The patient states he does not have diabetes.  He states he does drink approximately 12 beers a day.  He does have hypertension.    REVIEW OF SYSTEMS  See HPI for further details. All other systems are negative.     PAST MEDICAL HISTORY  Past Medical History:   Diagnosis Date   • Hypertension        FAMILY HISTORY  [unfilled]    SOCIAL HISTORY  Social History     Socioeconomic History   • Marital status:      Spouse name: Not on file   • Number of children: Not on file   • Years of education: Not on file   • Highest education level: Not on file   Occupational History   • Not on file   Social Needs   • Financial resource strain: Not on file   • Food insecurity:     Worry: Not on file     Inability: Not on file   • Transportation needs:     Medical: Not on file     Non-medical: Not on file   Tobacco Use   • Smoking status: Never Smoker   • Smokeless tobacco: Never Used   Substance and Sexual Activity   • Alcohol use: Yes     Frequency: 4 or more times a week     Drinks per session: 1 or 2   • Drug use: Never   • Sexual activity: Not on file  "  Lifestyle   • Physical activity:     Days per week: Not on file     Minutes per session: Not on file   • Stress: Not on file   Relationships   • Social connections:     Talks on phone: Not on file     Gets together: Not on file     Attends [employer] service: Not on file     Active member of club or organization: Not on file     Attends meetings of clubs or organizations: Not on file     Relationship status: Not on file   • Intimate partner violence:     Fear of current or ex partner: Not on file     Emotionally abused: Not on file     Physically abused: Not on file     Forced sexual activity: Not on file   Other Topics Concern   • Not on file   Social History Narrative   • Not on file       SURGICAL HISTORY  No past surgical history on file.    CURRENT MEDICATIONS  Home Medications     Reviewed by Asmita Vazquez R.N. (Registered Nurse) on 08/14/19 at 2308  Med List Status: Partial   Medication Last Dose Status        Patient [place] Taking any Medications                       ALLERGIES  Not on File    PHYSICAL EXAM  VITAL SIGNS: /56   Pulse (!) 110   Temp 37.2 °C (98.9 °F) (Temporal)   Resp 14   Ht 1.88 m (6' 2\")   Wt 90.7 kg (200 lb)   BMI 25.68 kg/m²  Room air O2: 94    Constitutional: Chronically ill in appearance  HENT: Normocephalic, Atraumatic, Bilateral external ears normal, Oropharynx moist, No oral exudates, Nose normal.   Eyes: PERRLA, EOMI, Conjunctiva normal, No discharge.   Neck: Normal range of motion, No tenderness, Supple, No stridor.   Lymphatic: No lymphadenopathy noted.   Cardiovascular: Tachycardic heart rate, Normal rhythm, No murmurs, No rubs, No gallops.   Thorax & Lungs: Normal breath sounds, No respiratory distress, No wheezing, No chest tenderness.   Abdomen: Bowel sounds normal, Soft, No tenderness, No masses, No pulsatile masses.   Skin: Open wound to the medial aspect of the left second toe lateral to the DIP joint.  The patient does have purulent drainage.  He has " significant induration and erythema of the second toe extending into the left foot and left lower extremity..   Back: No tenderness, No CVA tenderness.   Extremities: The open abscess to the left toe described above with the significant erythema.  The patient also has significant edema in the foot as well as the ankle.  The dorsalis pedis and posterior tibial pulses difficult to clinically evaluate due to the swelling in the left ankle.  The patient does have significant swelling of the left second toe, extremities otherwise intact distal pulses, No edema, No tenderness, No cyanosis, No clubbing.    Neurologic: Alert & oriented x 3, Normal motor function, Normal sensory function, No focal deficits noted.   Psychiatric: Affect normal, Judgment normal, Mood normal.     COURSE & MEDICAL DECISION MAKING  Pertinent Labs & Imaging studies reviewed. (See chart for details)  This is 68-year-old gentleman who was transferred in for evaluation of possible bacteremia from a cellulitis due to a toe wound.  I did review his x-ray and he does have a dislocation of the DIP joint of the left second toe.  This happened approximately 2 months ago when I did attempt reduction with no success in the emerge department as the patient does have significant swelling throughout the left second toe and I suspect he may require an amputation as he most likely does have a deep abscess.  The patient received Rocephin prior to transfer the patient received vancomycin.  I did review his laboratory analysis and he does have a leukocytosis.  He does not appear septic and blood cultures are pending at [facility].  The patient also has significant hyponatremia and hypochloremia.  He will receive a second liter of fluid.  The patient will need to be watched for alcohol withdrawal.  He does not appear to be toxic and he will be admitted in stable condition.    FINAL IMPRESSION  1.  Open abscess left second toe suspect osteomyelitis  2.  Fever  rule out bacteremia  3.  Left second toe dislocation in the DIP joint    Disposition  The patient will be admitted in stable condition         Electronically signed by: Rafy Ferro, 8/14/2019 11:22 PM

## 2019-08-15 NOTE — PROGRESS NOTES
Hospital Medicine Daily Progress Note    Date of Service  8/15/2019    Chief Complaint/Hospital Course  68 y.o. man with a history of gout, chronic hyponatremia, hypertension and ongoing alcohol abuse with lower extremity neuropathy admitted 8/14/2019 for worsening left foot edema and erythema.            Interval Problem Update  Patient stated left foot pain and swelling feels better after IV antibiotics and fluids started.  Fever resolved.  MRI of the foot done, report is pending  ID consulted  Sodium went up from from 115-127    Disposition  To be discussed      Consultants  Infectious disease  Outpatient    Code Status  Full code    Chief Complaint  Left foot pain and ulcer    Review of Systems  Review of Systems   Constitutional: Negative for chills, fever and weight loss.   HENT: Negative for ear pain, hearing loss and tinnitus.    Eyes: Negative for blurred vision, double vision and photophobia.   Respiratory: Negative for cough, hemoptysis and sputum production.    Cardiovascular: Negative for chest pain, palpitations and orthopnea.   Gastrointestinal: Negative for heartburn, nausea and vomiting.   Genitourinary: Negative for dysuria, flank pain, frequency and hematuria.   Musculoskeletal: Positive for joint pain. Negative for back pain and neck pain.   Skin: Negative for itching and rash.   Neurological: Negative for tremors, speech change, focal weakness and headaches.   Endo/Heme/Allergies: Negative for environmental allergies and polydipsia. Does not bruise/bleed easily.   Psychiatric/Behavioral: Negative for hallucinations and substance abuse. The patient is not nervous/anxious.         Physical Exam  Temp:  [36.4 °C (97.5 °F)-37.2 °C (99 °F)] 37.2 °C (99 °F)  Pulse:  [] 76  Resp:  [14-19] 18  BP: (119-139)/(56-77) 121/65  SpO2:  [94 %-98 %] 97 %    Physical Exam   Constitutional: He is oriented to person, place, and time. He appears well-developed and well-nourished.   HENT:   Head: Normocephalic  and atraumatic.   Eyes: Pupils are equal, round, and reactive to light. EOM are normal.   Neck: Normal range of motion. Neck supple.   Cardiovascular: Normal rate, regular rhythm and normal heart sounds.   Pulmonary/Chest: Effort normal and breath sounds normal.   Abdominal: Soft. Bowel sounds are normal.   Musculoskeletal: Normal range of motion.   Left foot edema extending proximally surrounded by erythema ; ulcer in the webspace between first and second toes of the left foot; Left second toe is edematous with the presence of an ulcer on the medial aspect.  Seropurulent discharge small amount   Neurological: He is alert and oriented to person, place, and time.   Skin: Skin is warm and dry.   Psychiatric: He has a normal mood and affect.   Nursing note and vitals reviewed.      Fluids    Intake/Output Summary (Last 24 hours) at 8/15/2019 1540  Last data filed at 8/15/2019 0900  Gross per 24 hour   Intake 400 ml   Output 1220 ml   Net -820 ml       Laboratory      Recent Labs     08/15/19  0555   SODIUM 127*   POTASSIUM 3.4*   CHLORIDE 89*   CO2 29   GLUCOSE 120*   BUN 14   CREATININE 0.85   CALCIUM 8.7                   Imaging  OUTSIDE IMAGES-DX LOWER EXTREMITY, LEFT   Final Result      MR-FOOT-WITH & W/O LEFT    (Results Pending)        Assessment/Plan  * Left foot infection  Assessment & Plan  Started on Unasyn and vancomycin, will continue  ID consulted, appreciate recommendation  Will need to follow with wound culture report from outside facility  We will follow with MRI of the left foot report and LPS recommendations    Diabetes type 2, controlled (HCC)  Assessment & Plan  A1c 7.0  Sliding scale  Diabetes education  Metformin upon discharge    Alcohol abuse  Assessment & Plan  The patient reports drinking up to a 12 pack of beer per day, history of alcohol withdrawal  Start on CIWA protocol  Monitor for withdrawal  Vitamins  Fall, aspiration, seizure precautions    Hyponatremia  Assessment & Plan  Acute on  chronic.  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  Also potassium sparing diuretics contributes  Sodium increased from 115-127 in the course of 24 hours his IV fluids  Asymptomatic  Monitor  Will refrain from sodium chloride solution at this point to avoid further overcorrection    HTN (hypertension)  Assessment & Plan  Resume home Dyazide.  Will discontinue triamterene due to tendency to hyponatremia       VTE prophylaxis: Heparin

## 2019-08-15 NOTE — PROGRESS NOTES
Pharmacy Kinetics Addendum:    68 y.o. male on vancomycin day # 1 8/15/2019    Currently on Vancomycin 1400 mg iv q24hr (2300)    Indication for Treatment: R/O Osteomyelitis    Recent Labs     08/15/19  0555   BUN 14   CREATININE 0.85     No results for input(s): VANCOTROUGH, VANCOPEAK, VANCORANDOM in the last 72 hours.    A/P   1. Vancomycin dose change: begin 1400mg (15mg/kg) q12hr (15, 03)  2. Next vancomycin level: 8/16 at 1400; prior to 4th dose; ordered  3. Goal trough: 12-16 mcg/mL  4. Comments: Renal indices appear to have normalized since admit, will increase maintenance vancomycin dosing to q12hr.  Trough to be ordered just prior to steady state. ID following.    Rupert Moe, PharmD

## 2019-08-15 NOTE — THERAPY
Occupational Therapy Contact Note    OT order received. Pt pending LPS consult for foot infection. Will hold OT eval until POC is established.     Alejandra Perez, OTR/L

## 2019-08-16 ENCOUNTER — ANESTHESIA EVENT (OUTPATIENT)
Dept: SURGERY | Facility: MEDICAL CENTER | Age: 69
DRG: 617 | End: 2019-08-16
Payer: MEDICARE

## 2019-08-16 ENCOUNTER — ANESTHESIA (OUTPATIENT)
Dept: SURGERY | Facility: MEDICAL CENTER | Age: 69
DRG: 617 | End: 2019-08-16
Payer: MEDICARE

## 2019-08-16 PROBLEM — E87.6 HYPOKALEMIA: Status: ACTIVE | Noted: 2019-08-16

## 2019-08-16 LAB
ANION GAP SERPL CALC-SCNC: 10 MMOL/L (ref 0–11.9)
ANION GAP SERPL CALC-SCNC: 9 MMOL/L (ref 0–11.9)
APTT PPP: 35.2 SEC (ref 24.7–36)
BUN SERPL-MCNC: 12 MG/DL (ref 8–22)
BUN SERPL-MCNC: 9 MG/DL (ref 8–22)
CALCIUM SERPL-MCNC: 7.8 MG/DL (ref 8.5–10.5)
CALCIUM SERPL-MCNC: 8.1 MG/DL (ref 8.5–10.5)
CHLORIDE SERPL-SCNC: 95 MMOL/L (ref 96–112)
CHLORIDE SERPL-SCNC: 96 MMOL/L (ref 96–112)
CO2 SERPL-SCNC: 27 MMOL/L (ref 20–33)
CO2 SERPL-SCNC: 30 MMOL/L (ref 20–33)
CREAT SERPL-MCNC: 0.83 MG/DL (ref 0.5–1.4)
CREAT SERPL-MCNC: 1.03 MG/DL (ref 0.5–1.4)
ERYTHROCYTE [DISTWIDTH] IN BLOOD BY AUTOMATED COUNT: 47.5 FL (ref 35.9–50)
GLUCOSE BLD-MCNC: 136 MG/DL (ref 65–99)
GLUCOSE BLD-MCNC: 89 MG/DL (ref 65–99)
GLUCOSE BLD-MCNC: 90 MG/DL (ref 65–99)
GLUCOSE SERPL-MCNC: 180 MG/DL (ref 65–99)
GLUCOSE SERPL-MCNC: 89 MG/DL (ref 65–99)
GRAM STN SPEC: NORMAL
GRAM STN SPEC: NORMAL
HCT VFR BLD AUTO: 29.7 % (ref 42–52)
HGB BLD-MCNC: 10.1 G/DL (ref 14–18)
INR PPP: 1.06 (ref 0.87–1.13)
MAGNESIUM SERPL-MCNC: 1.7 MG/DL (ref 1.5–2.5)
MCH RBC QN AUTO: 31.2 PG (ref 27–33)
MCHC RBC AUTO-ENTMCNC: 34 G/DL (ref 33.7–35.3)
MCV RBC AUTO: 91.7 FL (ref 81.4–97.8)
PHOSPHATE SERPL-MCNC: 3.6 MG/DL (ref 2.5–4.5)
PLATELET # BLD AUTO: 220 K/UL (ref 164–446)
PMV BLD AUTO: 9 FL (ref 9–12.9)
POTASSIUM SERPL-SCNC: 2.8 MMOL/L (ref 3.6–5.5)
POTASSIUM SERPL-SCNC: 2.9 MMOL/L (ref 3.6–5.5)
PROTHROMBIN TIME: 14.1 SEC (ref 12–14.6)
RBC # BLD AUTO: 3.24 M/UL (ref 4.7–6.1)
SIGNIFICANT IND 70042: NORMAL
SIGNIFICANT IND 70042: NORMAL
SITE SITE: NORMAL
SITE SITE: NORMAL
SODIUM SERPL-SCNC: 133 MMOL/L (ref 135–145)
SODIUM SERPL-SCNC: 134 MMOL/L (ref 135–145)
SOURCE SOURCE: NORMAL
SOURCE SOURCE: NORMAL
WBC # BLD AUTO: 8.5 K/UL (ref 4.8–10.8)

## 2019-08-16 PROCEDURE — A9270 NON-COVERED ITEM OR SERVICE: HCPCS | Performed by: INTERNAL MEDICINE

## 2019-08-16 PROCEDURE — 87186 SC STD MICRODIL/AGAR DIL: CPT

## 2019-08-16 PROCEDURE — 700102 HCHG RX REV CODE 250 W/ 637 OVERRIDE(OP): Performed by: INTERNAL MEDICINE

## 2019-08-16 PROCEDURE — 82962 GLUCOSE BLOOD TEST: CPT | Mod: 91

## 2019-08-16 PROCEDURE — 500881 HCHG PACK, EXTREMITY: Performed by: ORTHOPAEDIC SURGERY

## 2019-08-16 PROCEDURE — 700111 HCHG RX REV CODE 636 W/ 250 OVERRIDE (IP): Performed by: INTERNAL MEDICINE

## 2019-08-16 PROCEDURE — 160048 HCHG OR STATISTICAL LEVEL 1-5: Performed by: ORTHOPAEDIC SURGERY

## 2019-08-16 PROCEDURE — 770006 HCHG ROOM/CARE - MED/SURG/GYN SEMI*

## 2019-08-16 PROCEDURE — 700101 HCHG RX REV CODE 250: Performed by: ORTHOPAEDIC SURGERY

## 2019-08-16 PROCEDURE — 160036 HCHG PACU - EA ADDL 30 MINS PHASE I: Performed by: ORTHOPAEDIC SURGERY

## 2019-08-16 PROCEDURE — 80048 BASIC METABOLIC PNL TOTAL CA: CPT

## 2019-08-16 PROCEDURE — 85027 COMPLETE CBC AUTOMATED: CPT

## 2019-08-16 PROCEDURE — 160002 HCHG RECOVERY MINUTES (STAT): Performed by: ORTHOPAEDIC SURGERY

## 2019-08-16 PROCEDURE — 501838 HCHG SUTURE GENERAL: Performed by: ORTHOPAEDIC SURGERY

## 2019-08-16 PROCEDURE — 0Y6S0Z0 DETACHMENT AT LEFT 2ND TOE, COMPLETE, OPEN APPROACH: ICD-10-PCS | Performed by: ORTHOPAEDIC SURGERY

## 2019-08-16 PROCEDURE — A6550 NEG PRES WOUND THER DRSG SET: HCPCS | Performed by: ORTHOPAEDIC SURGERY

## 2019-08-16 PROCEDURE — 501329 HCHG SET, CYSTO IRRIG Y TUR: Performed by: ORTHOPAEDIC SURGERY

## 2019-08-16 PROCEDURE — 36415 COLL VENOUS BLD VENIPUNCTURE: CPT

## 2019-08-16 PROCEDURE — 700102 HCHG RX REV CODE 250 W/ 637 OVERRIDE(OP): Performed by: HOSPITALIST

## 2019-08-16 PROCEDURE — 160009 HCHG ANES TIME/MIN: Performed by: ORTHOPAEDIC SURGERY

## 2019-08-16 PROCEDURE — 700111 HCHG RX REV CODE 636 W/ 250 OVERRIDE (IP): Performed by: HOSPITALIST

## 2019-08-16 PROCEDURE — 160039 HCHG SURGERY MINUTES - EA ADDL 1 MIN LEVEL 3: Performed by: ORTHOPAEDIC SURGERY

## 2019-08-16 PROCEDURE — 87075 CULTR BACTERIA EXCEPT BLOOD: CPT

## 2019-08-16 PROCEDURE — 85610 PROTHROMBIN TIME: CPT

## 2019-08-16 PROCEDURE — 700105 HCHG RX REV CODE 258: Performed by: INTERNAL MEDICINE

## 2019-08-16 PROCEDURE — 700111 HCHG RX REV CODE 636 W/ 250 OVERRIDE (IP): Performed by: ANESTHESIOLOGY

## 2019-08-16 PROCEDURE — 87077 CULTURE AEROBIC IDENTIFY: CPT

## 2019-08-16 PROCEDURE — 160035 HCHG PACU - 1ST 60 MINS PHASE I: Performed by: ORTHOPAEDIC SURGERY

## 2019-08-16 PROCEDURE — 700105 HCHG RX REV CODE 258: Performed by: HOSPITALIST

## 2019-08-16 PROCEDURE — 700105 HCHG RX REV CODE 258: Performed by: ANESTHESIOLOGY

## 2019-08-16 PROCEDURE — 87015 SPECIMEN INFECT AGNT CONCNTJ: CPT

## 2019-08-16 PROCEDURE — 87205 SMEAR GRAM STAIN: CPT

## 2019-08-16 PROCEDURE — 99232 SBSQ HOSP IP/OBS MODERATE 35: CPT | Performed by: INTERNAL MEDICINE

## 2019-08-16 PROCEDURE — 87070 CULTURE OTHR SPECIMN AEROBIC: CPT

## 2019-08-16 PROCEDURE — 160028 HCHG SURGERY MINUTES - 1ST 30 MINS LEVEL 3: Performed by: ORTHOPAEDIC SURGERY

## 2019-08-16 PROCEDURE — A9270 NON-COVERED ITEM OR SERVICE: HCPCS | Performed by: HOSPITALIST

## 2019-08-16 PROCEDURE — 83735 ASSAY OF MAGNESIUM: CPT

## 2019-08-16 PROCEDURE — 84100 ASSAY OF PHOSPHORUS: CPT

## 2019-08-16 PROCEDURE — 85730 THROMBOPLASTIN TIME PARTIAL: CPT

## 2019-08-16 PROCEDURE — 700101 HCHG RX REV CODE 250: Performed by: ANESTHESIOLOGY

## 2019-08-16 RX ORDER — METOPROLOL TARTRATE 1 MG/ML
1 INJECTION, SOLUTION INTRAVENOUS
Status: DISCONTINUED | OUTPATIENT
Start: 2019-08-16 | End: 2019-08-16 | Stop reason: HOSPADM

## 2019-08-16 RX ORDER — DIPHENHYDRAMINE HYDROCHLORIDE 50 MG/ML
12.5 INJECTION INTRAMUSCULAR; INTRAVENOUS
Status: DISCONTINUED | OUTPATIENT
Start: 2019-08-16 | End: 2019-08-16 | Stop reason: HOSPADM

## 2019-08-16 RX ORDER — HYDRALAZINE HYDROCHLORIDE 20 MG/ML
5 INJECTION INTRAMUSCULAR; INTRAVENOUS
Status: DISCONTINUED | OUTPATIENT
Start: 2019-08-16 | End: 2019-08-16 | Stop reason: HOSPADM

## 2019-08-16 RX ORDER — SODIUM CHLORIDE 9 MG/ML
INJECTION, SOLUTION INTRAVENOUS CONTINUOUS
Status: DISCONTINUED | OUTPATIENT
Start: 2019-08-16 | End: 2019-08-16

## 2019-08-16 RX ORDER — OXYCODONE HCL 5 MG/5 ML
10 SOLUTION, ORAL ORAL
Status: DISCONTINUED | OUTPATIENT
Start: 2019-08-16 | End: 2019-08-16 | Stop reason: HOSPADM

## 2019-08-16 RX ORDER — OXYCODONE HCL 5 MG/5 ML
5 SOLUTION, ORAL ORAL
Status: DISCONTINUED | OUTPATIENT
Start: 2019-08-16 | End: 2019-08-16 | Stop reason: HOSPADM

## 2019-08-16 RX ORDER — CEFAZOLIN SODIUM 1 G/3ML
INJECTION, POWDER, FOR SOLUTION INTRAMUSCULAR; INTRAVENOUS PRN
Status: DISCONTINUED | OUTPATIENT
Start: 2019-08-16 | End: 2019-08-16 | Stop reason: SURG

## 2019-08-16 RX ORDER — ONDANSETRON 2 MG/ML
INJECTION INTRAMUSCULAR; INTRAVENOUS PRN
Status: DISCONTINUED | OUTPATIENT
Start: 2019-08-16 | End: 2019-08-16 | Stop reason: SURG

## 2019-08-16 RX ORDER — POTASSIUM CHLORIDE 20 MEQ/1
40 TABLET, EXTENDED RELEASE ORAL
Status: COMPLETED | OUTPATIENT
Start: 2019-08-16 | End: 2019-08-16

## 2019-08-16 RX ORDER — BUPIVACAINE HYDROCHLORIDE AND EPINEPHRINE 5; 5 MG/ML; UG/ML
INJECTION, SOLUTION EPIDURAL; INTRACAUDAL; PERINEURAL
Status: DISCONTINUED | OUTPATIENT
Start: 2019-08-16 | End: 2019-08-16 | Stop reason: HOSPADM

## 2019-08-16 RX ORDER — ONDANSETRON 2 MG/ML
4 INJECTION INTRAMUSCULAR; INTRAVENOUS
Status: DISCONTINUED | OUTPATIENT
Start: 2019-08-16 | End: 2019-08-16 | Stop reason: HOSPADM

## 2019-08-16 RX ORDER — SODIUM CHLORIDE, SODIUM LACTATE, POTASSIUM CHLORIDE, CALCIUM CHLORIDE 600; 310; 30; 20 MG/100ML; MG/100ML; MG/100ML; MG/100ML
INJECTION, SOLUTION INTRAVENOUS CONTINUOUS
Status: DISCONTINUED | OUTPATIENT
Start: 2019-08-16 | End: 2019-08-16 | Stop reason: HOSPADM

## 2019-08-16 RX ORDER — POTASSIUM CHLORIDE 7.45 MG/ML
10 INJECTION INTRAVENOUS
Status: ACTIVE | OUTPATIENT
Start: 2019-08-16 | End: 2019-08-16

## 2019-08-16 RX ORDER — SODIUM CHLORIDE, SODIUM LACTATE, POTASSIUM CHLORIDE, CALCIUM CHLORIDE 600; 310; 30; 20 MG/100ML; MG/100ML; MG/100ML; MG/100ML
INJECTION, SOLUTION INTRAVENOUS
Status: DISCONTINUED | OUTPATIENT
Start: 2019-08-16 | End: 2019-08-16 | Stop reason: SURG

## 2019-08-16 RX ORDER — HALOPERIDOL 5 MG/ML
1 INJECTION INTRAMUSCULAR
Status: DISCONTINUED | OUTPATIENT
Start: 2019-08-16 | End: 2019-08-16 | Stop reason: HOSPADM

## 2019-08-16 RX ORDER — MAGNESIUM SULFATE HEPTAHYDRATE 40 MG/ML
4 INJECTION, SOLUTION INTRAVENOUS ONCE
Status: ACTIVE | OUTPATIENT
Start: 2019-08-16 | End: 2019-08-17

## 2019-08-16 RX ORDER — HYDROMORPHONE HYDROCHLORIDE 1 MG/ML
0.6 INJECTION, SOLUTION INTRAMUSCULAR; INTRAVENOUS; SUBCUTANEOUS
Status: DISCONTINUED | OUTPATIENT
Start: 2019-08-16 | End: 2019-08-16 | Stop reason: HOSPADM

## 2019-08-16 RX ORDER — HYDROMORPHONE HYDROCHLORIDE 1 MG/ML
0.4 INJECTION, SOLUTION INTRAMUSCULAR; INTRAVENOUS; SUBCUTANEOUS
Status: DISCONTINUED | OUTPATIENT
Start: 2019-08-16 | End: 2019-08-16 | Stop reason: HOSPADM

## 2019-08-16 RX ORDER — HYDROMORPHONE HYDROCHLORIDE 1 MG/ML
0.2 INJECTION, SOLUTION INTRAMUSCULAR; INTRAVENOUS; SUBCUTANEOUS
Status: DISCONTINUED | OUTPATIENT
Start: 2019-08-16 | End: 2019-08-16 | Stop reason: HOSPADM

## 2019-08-16 RX ORDER — POTASSIUM CHLORIDE 20 MEQ/1
40 TABLET, EXTENDED RELEASE ORAL
Status: ACTIVE | OUTPATIENT
Start: 2019-08-16 | End: 2019-08-16

## 2019-08-16 RX ORDER — MEPERIDINE HYDROCHLORIDE 25 MG/ML
6.25 INJECTION INTRAMUSCULAR; INTRAVENOUS; SUBCUTANEOUS
Status: DISCONTINUED | OUTPATIENT
Start: 2019-08-16 | End: 2019-08-16 | Stop reason: HOSPADM

## 2019-08-16 RX ADMIN — CEFAZOLIN 2 G: 330 INJECTION, POWDER, FOR SOLUTION INTRAMUSCULAR; INTRAVENOUS at 09:06

## 2019-08-16 RX ADMIN — PROPOFOL 200 MG: 10 INJECTION, EMULSION INTRAVENOUS at 09:08

## 2019-08-16 RX ADMIN — SENNOSIDES, DOCUSATE SODIUM 2 TABLET: 50; 8.6 TABLET, FILM COATED ORAL at 18:32

## 2019-08-16 RX ADMIN — EPHEDRINE SULFATE 10 MG: 50 INJECTION INTRAMUSCULAR; INTRAVENOUS; SUBCUTANEOUS at 09:20

## 2019-08-16 RX ADMIN — HEPARIN SODIUM 5000 UNITS: 5000 INJECTION, SOLUTION INTRAVENOUS; SUBCUTANEOUS at 14:47

## 2019-08-16 RX ADMIN — ONDANSETRON 4 MG: 2 INJECTION INTRAMUSCULAR; INTRAVENOUS at 09:19

## 2019-08-16 RX ADMIN — AMPICILLIN SODIUM AND SULBACTAM SODIUM 3 G: 2; 1 INJECTION, POWDER, FOR SOLUTION INTRAMUSCULAR; INTRAVENOUS at 17:17

## 2019-08-16 RX ADMIN — AMPICILLIN SODIUM AND SULBACTAM SODIUM 3 G: 2; 1 INJECTION, POWDER, FOR SOLUTION INTRAMUSCULAR; INTRAVENOUS at 06:27

## 2019-08-16 RX ADMIN — POTASSIUM CHLORIDE 40 MEQ: 20 TABLET, EXTENDED RELEASE ORAL at 18:32

## 2019-08-16 RX ADMIN — OXYCODONE HYDROCHLORIDE 5 MG: 5 TABLET ORAL at 10:00

## 2019-08-16 RX ADMIN — SODIUM CHLORIDE: 9 INJECTION, SOLUTION INTRAVENOUS at 11:51

## 2019-08-16 RX ADMIN — VANCOMYCIN HYDROCHLORIDE 1400 MG: 500 INJECTION, POWDER, LYOPHILIZED, FOR SOLUTION INTRAVENOUS at 18:29

## 2019-08-16 RX ADMIN — ACETAMINOPHEN 1000 MG: 500 TABLET ORAL at 18:33

## 2019-08-16 RX ADMIN — POTASSIUM CHLORIDE 40 MEQ: 20 TABLET, EXTENDED RELEASE ORAL at 22:05

## 2019-08-16 RX ADMIN — AMPICILLIN SODIUM AND SULBACTAM SODIUM 3 G: 2; 1 INJECTION, POWDER, FOR SOLUTION INTRAMUSCULAR; INTRAVENOUS at 11:50

## 2019-08-16 RX ADMIN — SODIUM CHLORIDE, POTASSIUM CHLORIDE, SODIUM LACTATE AND CALCIUM CHLORIDE: 600; 310; 30; 20 INJECTION, SOLUTION INTRAVENOUS at 09:06

## 2019-08-16 RX ADMIN — POTASSIUM CHLORIDE 40 MEQ: 20 TABLET, EXTENDED RELEASE ORAL at 20:52

## 2019-08-16 RX ADMIN — VANCOMYCIN HYDROCHLORIDE 1400 MG: 500 INJECTION, POWDER, LYOPHILIZED, FOR SOLUTION INTRAVENOUS at 03:48

## 2019-08-16 RX ADMIN — ACETAMINOPHEN 1000 MG: 500 TABLET ORAL at 11:50

## 2019-08-16 RX ADMIN — FENTANYL CITRATE 100 MCG: 50 INJECTION, SOLUTION INTRAMUSCULAR; INTRAVENOUS at 09:08

## 2019-08-16 RX ADMIN — HEPARIN SODIUM 5000 UNITS: 5000 INJECTION, SOLUTION INTRAVENOUS; SUBCUTANEOUS at 20:54

## 2019-08-16 ASSESSMENT — ENCOUNTER SYMPTOMS
COUGH: 0
FLANK PAIN: 0
TREMORS: 0
CHILLS: 0
BLURRED VISION: 0
BACK PAIN: 0
NAUSEA: 0
BRUISES/BLEEDS EASILY: 0
HALLUCINATIONS: 0
SPUTUM PRODUCTION: 0
PALPITATIONS: 0
ORTHOPNEA: 0
HEARTBURN: 0
HEMOPTYSIS: 0
POLYDIPSIA: 0
VOMITING: 0
SPEECH CHANGE: 0
NECK PAIN: 0
FEVER: 0
WEIGHT LOSS: 0
FOCAL WEAKNESS: 0
NERVOUS/ANXIOUS: 0
HEADACHES: 0
DOUBLE VISION: 0
PHOTOPHOBIA: 0

## 2019-08-16 ASSESSMENT — LIFESTYLE VARIABLES
TREMOR: NO TREMOR
NAUSEA AND VOMITING: NO NAUSEA AND NO VOMITING
ANXIETY: NO ANXIETY (AT EASE)
PAROXYSMAL SWEATS: NO SWEAT VISIBLE
SUBSTANCE_ABUSE: 0
AUDITORY DISTURBANCES: NOT PRESENT
TOTAL SCORE: 0
ORIENTATION AND CLOUDING OF SENSORIUM: ORIENTED AND CAN DO SERIAL ADDITIONS
VISUAL DISTURBANCES: NOT PRESENT
AGITATION: NORMAL ACTIVITY
HEADACHE, FULLNESS IN HEAD: NOT PRESENT

## 2019-08-16 ASSESSMENT — PAIN SCALES - GENERAL: PAIN_LEVEL: 0

## 2019-08-16 NOTE — OR SURGEON
Immediate Post OP Note    PreOp Diagnosis: Left second toe osteomyelitis and associated abscess    PostOp Diagnosis: same    Procedure(s):  AMPUTATION, TOE - Wound Class: Dirty or Infected    Surgeon(s):  Vitaly Escalante M.D.    Anesthesiologist/Type of Anesthesia:  Anesthesiologist: Angel Zamora M.D./General    Surgical Staff:  Circulator: David Martinez R.N.  Scrub Person: Jarret Jimenezkker    Specimens removed if any:  ID Type Source Tests Collected by Time Destination   1 : Left second toe pus  Body Fluid Toe AEROBIC/ANAEROBIC CULTURE (SURGERY) Vitaly Escalante M.D. 8/16/2019  9:23 AM    2 : Left second toe bone  Bone Toe AEROBIC/ANAEROBIC CULTURE (SURGERY) Vitaly Escalante M.D. 8/16/2019  9:25 AM        Estimated Blood Loss: minimal    Findings: see dictation    Complications: none known    PLAN:  --readmit medicine postop  --continue wound vac  --continue IV abx therapy  --plan to return to OR in 3-4 days for repeat I&D and wound closure        8/16/2019 9:50 AM Vitaly Escalante M.D.

## 2019-08-16 NOTE — ANESTHESIA POSTPROCEDURE EVALUATION
Patient: Abdullahi Huynh    Procedure Summary     Date:  08/16/19 Room / Location:  [place] [address] / SURGERY [facility]    Anesthesia Start:  0906 Anesthesia Stop:  0953    Procedure:  AMPUTATION, TOE (Left Toe) Diagnosis:  (left second toe osteomyelitis)    Surgeon:  Vitaly Escalante M.D. Responsible Provider:  Angel Zamora M.D.    Anesthesia Type:  general ASA Status:  3          Final Anesthesia Type: general  Last vitals  BP   Blood Pressure : 155/88    Temp   37.3 °C (99.1 °F)    Pulse   Pulse: 81   Resp   18    SpO2   97 %      Anesthesia Post Evaluation    Patient location during evaluation: PACU  Patient participation: complete - patient participated  Level of consciousness: awake and alert  Pain score: 0    Airway patency: patent  Anesthetic complications: no  Cardiovascular status: hemodynamically stable  Respiratory status: acceptable  Hydration status: euvolemic    PONV: none           Nurse Pain Score: 0 (NPRS)

## 2019-08-16 NOTE — ANESTHESIA PROCEDURE NOTES
Airway  Performed by: Angel Zamora M.D.  Authorized by: Angel Zamora M.D.     Location:  OR  Urgency:  Elective  Indications for Airway Management:  Anesthesia  Spontaneous Ventilation: absent    Sedation Level:  Deep  Preoxygenated: Yes    Final Airway Type:  Supraglottic airway  Final Supraglottic Airway:  Standard LMA  SGA Size:  4  Number of Attempts at Approach:  1

## 2019-08-16 NOTE — PROGRESS NOTES
"Pharmacy Kinetics 68 y.o. male on vancomycin day # 2   2019    Currently on Vancomycin 1,400mg iv q12hr (0400, 1600)    Provider specified end date: 6 weeks     Indication for Treatment: L second toe osteomyelitis w/associated abscess.      Pertinent history per medical record: Admitted on 2019 for osteomyelitis work up. Patient stubbed toe two months ago. Did not seek treatment at time. Now has abscess and imaging concerning for osteomyelitis. Sent from OSH for evaluation. Received 2g ceftriaxone prior to arrival.      Other antibiotics: unasyn 3g IV q6h     Allergies: Patient has no known allergies.      List concerns for renal function: Age, Electrolyte derangements    Pertinent cultures to date:   19: Wound cx - in process   19: Tissue cx - in process   19: Associated anaerobic cx's - in process       Recent Labs     19  0203   WBC 8.5     Recent Labs     08/15/19  0555 19  0203   BUN 14 12   CREATININE 0.85 0.83     No results for input(s): VANCOTROUGH, VANCOPEAK, VANCORANDOM in the last 72 hours.    Intake/Output Summary (Last 24 hours) at 2019 1115  Last data filed at 2019 1000  Gross per 24 hour   Intake 1440 ml   Output 615 ml   Net 825 ml      /73   Pulse 74   Temp 37.3 °C (99.1 °F) (Temporal)   Resp 13   Ht 1.88 m (6' 2\")   Wt 90.7 kg (200 lb)   SpO2 94%  Temp (24hrs), Av.1 °C (98.8 °F), Min:36.4 °C (97.5 °F), Max:37.5 °C (99.5 °F)      A/P   1. Vancomycin dose change: No.   2. Next vancomycin level: Tomorrow,  at 1530   3. Goal trough: 12 - 16 mcg/mL   4. Comments: s/p L second toe amputation and I&D with ortho surgery. Cultures in process. Vancomycin trough level tomorrow at 1530. Doses requiring re-timing d/t patient in OR. Renal indices stable. Electrolytes are being repleted. Afebrile, no leukocytosis, VS stable.ID consulting.     Brooke Kaiser, PharmD      "

## 2019-08-16 NOTE — CARE PLAN
Problem: Safety  Goal: Will remain free from injury  Outcome: PROGRESSING AS EXPECTED  Intervention: Provide assistance with mobility  Note:   Patient educated regarding fall risks. Environmental hazards reviewed such as IV tubing, SCDs, spills. Patient directed to use call light for assistance before getting out of bed or ambulating. Patient verbalized understanding.        Problem: Infection  Goal: Will remain free from infection  Outcome: NOT MET  Intervention: Assess signs and symptoms of infection  Note:   Patient educated regarding infection control including hand hygiene, personal cares. Patient educated regarding s/s of infection including pain, fever, heat and redness at iwound site. Patient encouraged to discuss any concerns with RN or care team. Patient verbalized understanding.

## 2019-08-16 NOTE — OP REPORT
DATE OF SERVICE:  08/16/2019    PREOPERATIVE DIAGNOSIS:  Left second toe osteomyelitis and associated abscess.    POSTOPERATIVE DIAGNOSIS:  Left second toe osteomyelitis and associated   abscess.    PROCEDURE PERFORMED:  1.  Amputation of left second toe through metatarsophalangeal joint.  2.  Incision and drainage of left second toe abscess.  3.  Application of wound VAC less than 50 square cm.    SURGEON:  Vitaly Escalante MD    ANESTHESIOLOGIST:  Angel Zamora MD    ANESTHESIA:  General.    ESTIMATED BLOOD LOSS:  Minimal.    TOURNIQUET TIME:  23 minutes at 250 mmHg to the left calf.    DRAINS:  Wound VAC at 125 mmHg low continuous pressure was applied to the open   left foot wound.    INDICATION FOR PROCEDURE:  The patient is a 68-year-old male.  He has a   history of diabetes.  He developed infection involving the second toe.  MRI   confirmed changes concerning for osteomyelitis and fluid collection and   clinically had a large abscess in the toe as well as areas of devitalized   skin.  My colleague, Dr. Casey, was initially consulted to provide   treatment recommendations and asked if I would be available for definitive   surgical management, which I was happy to do.  I met the patient in the preop   holding area and recommended amputation of the second toe.  He signed informed   consent preoperatively and wished to proceed as outlined above.    DESCRIPTION OF PROCEDURE:  The patient was met in the preop holding area and   his surgical site was signed.  His consent was confirmed for accuracy.  He was   taken back to the operating room and general anesthesia was induced.  The   tourniquet was applied to left thigh.  Left lower extremity was prepped and   draped in the usual sterile fashion.  A formal timeout was performed to   confirm patient's correct name, correct surgical site, correct procedure and   correct laterality.  The limb was elevated and then the tourniquet was   inflated to 250 mmHg.  A  circumferential incision was made starting dorsally   extending to the plantar surface at the base of the second toe with a scalpel   directly down through skin.  There was extensive purulent drainage within the   soft tissue plane.  There was just destructive changes at the distal aspect of   the proximal phalanx.  I amputated the toe through the metatarsophalangeal   joint and sent the proximal phalanx to the lab for culture including a wound   swab of the purulent fluid.  Thorough irrigation of the wound was performed   using normal saline until the wound looked more clean, but given the extensive   purulent drainage associated with the abscess, I felt that it was not in the   patient's best interest to perform primary closure of the wound.  I did   reapproximate the skin edges to the incisional portion of the wound dorsally   with interrupted 3-0 nylon, left the remaining portion of the wound open,   which measured about 3 cm x 2 cm and placed a wound VAC sponge to the open   wound and protected the incisional component and adjacent skin edges with   Mepitel protective barrier and applied an incisional wound VAC component to   the remaining portion and achieved a good seal at 125 mmHg low continuous   pressure.  The tourniquet was then deflated after 23 minutes.  He was awoken   from anesthesia and transferred on the [facility] and taken to the postanesthesia   care unit in stable condition.    PLAN:  1.  The patient will be readmitted to the medical service postop.  2.  He can be heel weightbearing only to the left lower extremity and up in a   diabetic boot.  3.  Recommend continuing wound VAC therapy for now.  4.  Recommend following up intraoperative cultures and continuing IV   antibiotic therapy.  5.  My plan would be to return to the operating room in 3-4 days for repeat   wound debridement and secondary closure of the wound.       ____________________________________     MD JAIRON Webster /  GENE    DD:  08/16/2019 09:56:42  DT:  08/16/2019 10:11:11    D#:  [id number]  Job#:  [id number]

## 2019-08-16 NOTE — PROGRESS NOTES
68yoM with left second toe osteomyelitis.  Planning amputation of second toe based on clinical appearance and MRI findings.  Patient is in agreement after discussing risks, benefits and alternatives.  We discussed expected postop course as well.

## 2019-08-16 NOTE — CARE PLAN
Problem: Communication  Goal: The ability to communicate needs accurately and effectively will improve  Outcome: PROGRESSING AS EXPECTED  Intervention: [place] patient and significant other/support system to call light to alert staff of needs  Note:   Pt educated on the use of the call light to get assistance from staff.      Problem: Bowel/Gastric:  Goal: Will not experience complications related to bowel motility  Outcome: PROGRESSING AS EXPECTED  Intervention: Assess baseline bowel pattern  Note:   Pt states that he is having his normal daily BM.

## 2019-08-16 NOTE — PROGRESS NOTES
Hospital Medicine Daily Progress Note    Date of Service  8/16/2019    Chief Complaint/Hospital Course  68 y.o. man with a history of gout, chronic hyponatremia, hypertension and ongoing alcohol abuse with lower extremity neuropathy admitted 8/14/2019 for worsening left foot edema and erythema.            Interval Problem Update  Severe hypokalemia: Replaced p.o. and IV.  Will repeat BMP  Patient is status post left second toe amputation for osteomyelitis and abscess.  Wound vacuum in place, sanguinous discharge noted.  Patient stated pain resolved.  Sodium improved  I discussed with patient new diagnosis of diabetes and lifestyle modification changes  recommended    Disposition  To be discussed  Will need repeat I&D in 3 to 4 days.  Continue wound vacuum      Consultants  Infectious disease  Outpatient    Code Status  Full code    Chief Complaint  Left foot pain and ulcer    Review of Systems  Review of Systems   Constitutional: Negative for chills, fever and weight loss.   HENT: Negative for ear pain, hearing loss and tinnitus.    Eyes: Negative for blurred vision, double vision and photophobia.   Respiratory: Negative for cough, hemoptysis and sputum production.    Cardiovascular: Negative for chest pain, palpitations and orthopnea.   Gastrointestinal: Negative for heartburn, nausea and vomiting.   Genitourinary: Negative for dysuria, flank pain, frequency and hematuria.   Musculoskeletal: Positive for joint pain. Negative for back pain and neck pain.   Skin: Negative for itching and rash.   Neurological: Negative for tremors, speech change, focal weakness and headaches.   Endo/Heme/Allergies: Negative for environmental allergies and polydipsia. Does not bruise/bleed easily.   Psychiatric/Behavioral: Negative for hallucinations and substance abuse. The patient is not nervous/anxious.         Physical Exam  Temp:  [36.4 °C (97.5 °F)-37.5 °C (99.5 °F)] 36.8 °C (98.3 °F)  Pulse:  [67-98] 70  Resp:  [12-18] 16  BP:  (111-155)/(61-88) 118/68  SpO2:  [91 %-97 %] 95 %    Physical Exam   Constitutional: He is oriented to person, place, and time. He appears well-developed and well-nourished.   HENT:   Head: Normocephalic and atraumatic.   Eyes: Pupils are equal, round, and reactive to light. EOM are normal.   Neck: Normal range of motion. Neck supple.   Cardiovascular: Normal rate, regular rhythm and normal heart sounds.   Pulmonary/Chest: Effort normal and breath sounds normal.   Abdominal: Soft. Bowel sounds are normal.   Musculoskeletal: Normal range of motion.   Wound vacuum over left second toe amputation site.  Subcutaneous discharge.  Erythema and swelling over the dorsal foot improved comparing yesterday   Neurological: He is alert and oriented to person, place, and time.   Skin: Skin is warm and dry.   Psychiatric: He has a normal mood and affect.   Nursing note and vitals reviewed.      Fluids    Intake/Output Summary (Last 24 hours) at 8/16/2019 1533  Last data filed at 8/16/2019 1000  Gross per 24 hour   Intake 1440 ml   Output 615 ml   Net 825 ml       Laboratory  Recent Labs     08/16/19  0203   WBC 8.5   RBC 3.24*   HEMOGLOBIN 10.1*   HEMATOCRIT 29.7*   MCV 91.7   MCH 31.2   MCHC 34.0   RDW 47.5   PLATELETCT 220   MPV 9.0     Recent Labs     08/15/19  0555 08/16/19  0203   SODIUM 127* 133*   POTASSIUM 3.4* 2.8*   CHLORIDE 89* 96   CO2 29 27   GLUCOSE 120* 89   BUN 14 12   CREATININE 0.85 0.83   CALCIUM 8.7 8.1*     Recent Labs     08/16/19  1217   APTT 35.2   INR 1.06               Imaging  MR-FOOT-WITH & W/O LEFT   Final Result      Second PIP dorsal lateral dislocation with slight middle phalanx base plantar impaction fracture      Severe second toe centered skin and subcutaneous enhancement is compatible with cellulitis and phlegmon versus posttraumatic reactive change. No high-grade bony destruction is seen to indicate long-standing osteomyelitis. Unfortunately, subacute    dislocation as can be seen with  neuropathic or posttraumatic origin has considerable overlap with infection.      Fluid tracking along the proximal second phalanx most likely is reactive. Superinfection/abscess is not excluded in the event there has been skin breakdown or infection is suspected      Moderate first metatarsal-phalangeal osteoarthritis      OUTSIDE IMAGES-DX LOWER EXTREMITY, LEFT   Final Result           Assessment/Plan  * Left foot infection  Assessment & Plan  Started on Unasyn and vancomycin, will continue  ID consulted, appreciate recommendation  Will need to follow with wound culture report from outside facility  MRI of the left foot showed osteomyelitis and abscess of the left second toe  Orthopedic surgery consulted  8/16 amputation of second toe  Follow-up with surgical culture  Continue antibiotics  Plan to repeat I&D and wound closure in 3 to 4 days    Hypokalemia  Assessment & Plan  Severe.  Replaced p.o. and IV  Magnesium also replaced    Diabetes type 2, controlled (HCC)  Assessment & Plan  A1c 7.0  Sliding scale  Diabetes education  Metformin upon discharge    Alcohol abuse  Assessment & Plan  The patient reports drinking up to a 12 pack of beer per day, history of alcohol withdrawal  Start on CIWA protocol  Monitor for withdrawal, no evidence of withdrawal  Vitamins  Fall, aspiration, seizure precautions    Hyponatremia  Assessment & Plan  Acute on chronic.  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  Also potassium sparing diuretics contributes  Sodium increased from 115-127 in the course of 24 hours his IV fluids  Asymptomatic  Monitor  8/16 sodium 133    HTN (hypertension)  Assessment & Plan  Resume home Dyazide.  Will discontinue triamterene due to tendency to hyponatremia       VTE prophylaxis: Heparin

## 2019-08-16 NOTE — ANESTHESIA QCDR
[facility] Clinical Data Registry (for Quality Improvement)     Postoperative nausea/vomiting risk protocol (Adult = 18 yrs and Pediatric 3-17 yrs)- (430 and 463)  General inhalation anesthetic (NOT TIVA) with PONV risk factors: Yes  Provision of anti-emetic therapy with at least 2 different classes of agents: Yes   Patient DID NOT receive anti-emetic therapy and reason is documented in Medical Record:  N/A    Multimodal Pain Management- (AQI59)  Patient undergoing Elective Surgery (i.e. Outpatient, or ASC, or Prescheduled Surgery prior to Hospital Admission): Yes  Use of Multimodal Pain Management, two or more drugs and/or interventions, NOT including systemic opioids: Yes   Exception: Documented allergy to multiple classes of analgesics:  N/A    PACU assessment of acute postoperative pain prior to Anesthesia Care End- Applies to Patients Age = 18- (ABG7)  Initial PACU pain score is which of the following: < 7/10  Patient unable to report pain score: N/A    Post-anesthetic transfer of care checklist/protocol to PACU/ICU- (426 and 427)  Upon conclusion of case, patient transferred to which of the following locations: PACU/Non-ICU  Use of transfer checklist/protocol:   Exclusion: Service Performed in Patient Hospital Room (and thus did not require transfer):     PACU Reintubation- (AQI31)  General anesthesia requiring endotracheal intubation (ETT) along with subsequent extubation in OR or PACU: Yes  Required reintubation in the PACU: No   Extubation was a planned trial documented in the medical record prior to removal of the original airway device:  N/A    Unplanned admission to ICU related to anesthesia service up through end of PACU care- (MD51)  Unplanned admission to ICU (not initially anticipated at anesthesia start time): No

## 2019-08-16 NOTE — ANESTHESIA PREPROCEDURE EVALUATION
Relevant Problems   CARDIAC   (+) HTN (hypertension)      ENDO   (+) Diabetes type 2, controlled (HCC)       Physical Exam    Airway   Mallampati: II  TM distance: >3 FB  Neck ROM: full       Cardiovascular - normal exam  Rhythm: regular  Rate: normal  (-) murmur     Dental - normal exam         Pulmonary - normal exam  Breath sounds clear to auscultation     Abdominal    Neurological - normal exam                 Anesthesia Plan    ASA 3   ASA physical status 3 criteria: alcohol and/or substance dependence or abuse and hypertension - poorly controlled    Plan - general             Induction: intravenous    Postoperative Plan: Postoperative administration of opioids is intended.    Pertinent diagnostic labs and testing reviewed    Informed Consent:    Anesthetic plan and risks discussed with patient.    Use of blood products discussed with: patient whom consented to blood products.

## 2019-08-16 NOTE — THERAPY
Occupational Therapy Contact Note    Will evaluate for OT needs following surgical intervention of foot infection.    Alejandra Perez, OTR/L

## 2019-08-16 NOTE — WOUND TEAM
"Wound consult received for left 2nd toe wound, patient just returned from OR for left 2nd toe amputation with NPWT vac placement. Per Dr. Escalante, patient will \"return to OR in 3-4 days for repeat wound debridement and secondary closure of wound.\" No wound care involvement at this time.  "

## 2019-08-16 NOTE — ANESTHESIA TIME REPORT
Anesthesia Start and Stop Event Times     Date Time Event    8/16/2019 0856 Ready for Procedure     0906 Anesthesia Start     0953 Anesthesia Stop        Responsible Staff  08/16/19    Name Role Begin End    Angel Zamora M.D. Anesth 0906 0953        Preop Diagnosis (Free Text):  Pre-op Diagnosis     left second toe osteomyelitis        Preop Diagnosis (Codes):    Post op Diagnosis  Toe amputation status, left (HCC)      Premium Reason  Non-Premium    Comments:

## 2019-08-16 NOTE — DOCUMENTATION QUERY
[facility]                                                                       Query Response Note      PATIENT:               FREDI LOUISE  ACCT #:                  [id number]  MRN:                     [id number]  :                      1950  ADMIT DATE:       2019 11:03 PM  DISCH DATE:          RESPONDING  PROVIDER #:        [id number]           QUERY TEXT:    Please clarify the pattern of use of patient's alcohol usage.    NOTE:  If an appropriate response is not listed below, please respond with a new note.      The patient's Clinical Indicators include:  Alcohol abuse, drinks up to a 12 pack of beer per day, history of withdrawal  8/15 CIWA 3 (tremors)   Risk Factors: 12 beers/ day  Treatment: CIWA protocol, monitor for withdrawal, vitamins, fall/ aspiration/ seizure precautions  Options provided:   -- Alcohol abuse   -- Alcohol dependence without withdrawal   -- Alcohol dependence with withdrawal   -- Alcohol remission   -- Alcohol use only   -- Unable to determine      Query created by: Jordin Santo on 2019 7:16 AM    RESPONSE TEXT:    Alcohol dependence without withdrawal          Electronically signed by:  RONNY GUERRA 2019 1:21 PM

## 2019-08-16 NOTE — CONSULTS
Orthopaedic Surgery Consult Note:    Silver Casey  Date & Time note created:    8/16/2019   1:38 AM       Patient ID:   Name:             Abdullahi Huynh     YOB: 1950  Age:                 68 y.o.  male   MRN:               [id number]                                                             Reason for Consult:      Left second toe infection    History of Present Illness:    Mr. Huynh is a pleasant man who presented to the ER for evaluation of left toe pain, redness and edema that extended up the left leg.  He reports roughly two weeks of pain and swelling in the left second toe that has progressively worsened.  He does report stubbing his toe on a chair around the time this all began.  He was admitted and started on IV abx.  This has improved his pain but has not resolved the pain nor the redness and edema.    Review of Systems:      Constitutional: Denies fevers, Denies weight changes  Eyes: Denies changes in vision, no eye pain  Ears/Nose/Throat/Mouth: Denies nasal congestion or sore throat   Cardiovascular: Denies chest pain   Respiratory: Denies shortness of breath , Denies cough  Gastrointestinal/Hepatic: Denies abdominal pain, nausea, vomiting, diarrhea, constipation or GI bleeding   Genitourinary: Denies dysuria or frequency  Musculoskeletal/Rheum: Left second toe pain and swelling  Skin: Denies rash  Neurological: Denies headache, confusion, memory loss or focal weakness/parasthesias  Psychiatric: denies mood disorder   Endocrine: Abby thyroid problems  Heme/Oncology/Lymph Nodes: Denies enlarged lymph nodes, denies brusing or known bleeding disorder  All other systems were reviewed and are negative (AMA/CMS criteria)                Past Medical History:   Past Medical History:   Diagnosis Date   • Hypertension      Active Hospital Problems    Diagnosis   • Left foot infection [L08.9]     Priority: High   • HTN (hypertension) [I10]     Priority: Low   • Hyponatremia  [E87.1]   • Alcohol abuse [F10.10]   • Diabetes type 2, controlled (HCC) [E11.9]       Past Surgical History:  History reviewed. No pertinent surgical history.    Hospital Medications:    Current Facility-Administered Medications:   •  senna-docusate (PERICOLACE or SENOKOT S) 8.6-50 MG per tablet 2 Tab, 2 Tab, Oral, BID **AND** polyethylene glycol/lytes (MIRALAX) PACKET 1 Packet, 1 Packet, Oral, QDAY PRN **AND** magnesium hydroxide (MILK OF MAGNESIA) suspension 30 mL, 30 mL, Oral, QDAY PRN **AND** bisacodyl (DULCOLAX) suppository 10 mg, 10 mg, Rectal, QDAY PRN, Nurys William M.D.  •  heparin injection 5,000 Units, 5,000 Units, Subcutaneous, Q8HRS, Nurys William M.D., 5,000 Units at 08/15/19 2242  •  ondansetron (ZOFRAN) syringe/vial injection 4 mg, 4 mg, Intravenous, Q4HRS PRN, Nurys William M.D.  •  ondansetron (ZOFRAN ODT) dispertab 4 mg, 4 mg, Oral, Q4HRS PRN, Nurys William M.D.  •  NS (BOLUS) infusion 500 mL, 500 mL, Intravenous, Once PRN, Nurys William M.D.  •  ampicillin/sulbactam (UNASYN) 3 g in  mL IVPB, 3 g, Intravenous, Q6HRS, Nurys William M.D., Stopped at 08/15/19 2312  •  MD Alert...Vancomycin per Pharmacy, 1 Each, Other, PHARMACY TO DOSE, Nurys William M.D.  •  Pharmacy Consult Request ...Pain Management Review 1 Each, 1 Each, Other, PHARMACY TO DOSE, Sunil Eagle M.D.  •  acetaminophen (TYLENOL) tablet 1,000 mg, 1,000 mg, Oral, Q6HRS, Sunil Eagle M.D., 1,000 mg at 08/15/19 2242  •  oxyCODONE immediate release (ROXICODONE) tablet 10 mg, 10 mg, Oral, Q3HRS PRN, Sunil Eagle M.D.  •  morphine (pf) 4 mg/ml injection 4 mg, 4 mg, Intravenous, Q3HRS PRN, Sunil Eagle M.D.  •  oxyCODONE immediate-release (ROXICODONE) tablet 5 mg, 5 mg, Oral, Q3HRS PRN, Sunil Eagle M.D.  •  thiamine tablet 100 mg, 100 mg, Oral, DAILY, 100 mg at 08/15/19 1249 **AND** multivitamin (THERAGRAN) tablet 1 Tab, 1 Tab, Oral, DAILY, 1 Tab at 08/15/19 1249 **AND** folic acid (FOLVITE)  tablet 1 mg, 1 mg, Oral, DAILY, Sunil Eagle M.D., 1 mg at 08/15/19 1249  •  LORazepam (ATIVAN) tablet 0.5 mg, 0.5 mg, Oral, Q4HRS PRN, Sunil Eagle M.D.  •  LORazepam (ATIVAN) tablet 1 mg, 1 mg, Oral, Q4HRS PRN **OR** LORazepam (ATIVAN) injection 0.5 mg, 0.5 mg, Intravenous, Q4HRS PRN, Sunil Eagle M.D.  •  LORazepam (ATIVAN) tablet 2 mg, 2 mg, Oral, Q2HRS PRN **OR** LORazepam (ATIVAN) injection 1 mg, 1 mg, Intravenous, Q2HRS PRN, Sunil Eagle M.D.  •  LORazepam (ATIVAN) tablet 3 mg, 3 mg, Oral, Q HOUR PRN **OR** LORazepam (ATIVAN) injection 1.5 mg, 1.5 mg, Intravenous, Q HOUR PRN, Sunil Eagle M.D.  •  LORazepam (ATIVAN) tablet 4 mg, 4 mg, Oral, Q15 MIN PRN **OR** LORazepam (ATIVAN) injection 2 mg, 2 mg, Intravenous, Q15 MIN PRN, Sunil Eagle M.D.  •  vancomycin (VANCOCIN) 1,400 mg in  mL IVPB, 15 mg/kg, Intravenous, Q12HR, Sunil Eagle M.D., Stopped at 08/15/19 2033  •  insulin regular (HUMULIN R) injection 1-6 Units, 1-6 Units, Subcutaneous, 4X/DAY ACHS, 1 Units at 08/15/19 2244 **AND** Accu-Chek ACHS, , , Q AC AND BEDTIME(S) **AND** NOTIFY MD and PharmD, , , Once **AND** glucose 4 g chewable tablet 16 g, 16 g, Oral, Q15 MIN PRN **AND** DEXTROSE 10% BOLUS 250 mL, 250 mL, Intravenous, Q15 MIN PRN, Sunil Kuharevic, M.D.    Current Outpatient Medications:  Medications Prior to Admission   Medication Sig Dispense Refill Last Dose   • triamterene (DYRENIUM) 50 MG Cap Take 50 mg by mouth every day.   8/15/2019 at 0800   • NON SPECIFIED Take 1 Tab by mouth every day. Gout relief medication OTC   8/15/2019 at 0800       Medication Allergy:  No Known Allergies    Family History:  History reviewed. No pertinent family history.    Social History:  Social History     Socioeconomic History   • Marital status:      Spouse name: Not on file   • Number of children: Not on file   • Years of education: Not on file   • Highest education level: Not on file   Occupational History   •  "Not on file   Social Needs   • Financial resource strain: Not on file   • Food insecurity:     Worry: Not on file     Inability: Not on file   • Transportation needs:     Medical: Not on file     Non-medical: Not on file   Tobacco Use   • Smoking status: Never Smoker   • Smokeless tobacco: Never Used   Substance and Sexual Activity   • Alcohol use: Yes     Frequency: 4 or more times a week     Drinks per session: 1 or 2   • Drug use: Never   • Sexual activity: Not on file   Lifestyle   • Physical activity:     Days per week: Not on file     Minutes per session: Not on file   • Stress: Not on file   Relationships   • Social connections:     Talks on phone: Not on file     Gets together: Not on file     Attends [employer] service: Not on file     Active member of club or organization: Not on file     Attends meetings of clubs or organizations: Not on file     Relationship status: Not on file   • Intimate partner violence:     Fear of current or ex partner: Not on file     Emotionally abused: Not on file     Physically abused: Not on file     Forced sexual activity: Not on file   Other Topics Concern   • Not on file   Social History Narrative   • Not on file         Physical Exam:  Vitals/ General Appearance:   Weight/BMI: Body mass index is 25.68 kg/m².  /63   Pulse 98   Temp 36.9 °C (98.4 °F) (Temporal)   Resp 18   Ht 1.88 m (6' 2\")   Wt 90.7 kg (200 lb)   SpO2 96%   Vitals:    08/15/19 0141 08/15/19 0800 08/15/19 1600 08/15/19 1905   BP: 135/76 121/65 121/66 114/63   Pulse: 76 76 76 98   Resp: 18 18 18 18   Temp: 36.4 °C (97.5 °F) 37.2 °C (99 °F) 37.5 °C (99.5 °F) 36.9 °C (98.4 °F)   TempSrc:  Temporal Temporal Temporal   SpO2: 98% 97% 96% 96%   Weight:       Height:           Constitutional:   Well developed, Well nourished, No acute distress  HENMT:  Normocephalic, Atraumatic, Oropharynx moist mucous membranes, No oral exudates, Nose normal.  No thyromegaly.  Eyes:  EOMI, Conjunctiva normal, No " discharge.  Neck:  Normal range of motion, No cervical tenderness,  no JVD.  Cardiovascular:  Regular rate and rhythm  Lungs:  Normal breathing  Abdomen: Soft, non-tender, non-distended.  Skin: Warm, Dry, No erythema, No rash, no induration.  Neurologic: Alert & oriented x 3, No focal deficits noted, cranial nerves II through X are grossly intact.  Psychiatric: Affect normal, Judgment normal, Mood normal.  Musculoskeletal: Exam of the LLE reveals:  Erythema extending from the anterior mid tibia to the dorsum of the foot and out to the toes  There is an open wound over the medial aspect of the second toe with purulent drainage  There is no fluctuance or evidence of abscess  Mild TTP over the second toe  Unable to palpate a DP pulse due to edema  Moderate edema over the dorsum of the foot    Lab Data Review:  Recent Results (from the past 24 hour(s))   HEMOGLOBIN A1C    Collection Time: 08/15/19  5:55 AM   Result Value Ref Range    Glycohemoglobin 7.0 (H) 0.0 - 5.6 %    Est Avg Glucose 154 mg/dL   OSMOLALITY SERUM    Collection Time: 08/15/19  5:55 AM   Result Value Ref Range    Osmolality Serum 257 (L) 278 - 298 mOsm/kg H2O   BASIC METABOLIC PANEL    Collection Time: 08/15/19  5:55 AM   Result Value Ref Range    Sodium 127 (L) 135 - 145 mmol/L    Potassium 3.4 (L) 3.6 - 5.5 mmol/L    Chloride 89 (L) 96 - 112 mmol/L    Co2 29 20 - 33 mmol/L    Glucose 120 (H) 65 - 99 mg/dL    Bun 14 8 - 22 mg/dL    Creatinine 0.85 0.50 - 1.40 mg/dL    Calcium 8.7 8.5 - 10.5 mg/dL    Anion Gap 9.0 0.0 - 11.9   ESTIMATED GFR    Collection Time: 08/15/19  5:55 AM   Result Value Ref Range    GFR If African American >60 >60 mL/min/1.73 m 2    GFR If Non African American >60 >60 mL/min/1.73 m 2   WESTERGREN SED RATE    Collection Time: 08/15/19  5:55 AM   Result Value Ref Range    Sed Rate Westergren 57 (H) 0 - 20 mm/hour   ACCU-CHEK GLUCOSE    Collection Time: 08/15/19  5:56 PM   Result Value Ref Range    Glucose - Accu-Ck 77 65 - 99 mg/dL    ACCU-CHEK GLUCOSE    Collection Time: 08/15/19  9:08 PM   Result Value Ref Range    Glucose - Accu-Ck 180 (H) 65 - 99 mg/dL   OSMOLALITY URINE    Collection Time: 08/15/19  9:10 PM   Result Value Ref Range    Osmolality Urine 278 (L) 300 - 900 mOsm/kg H2O       Imaging: Fluid collection about the left second toe on MRI with evidence of MTP joint dislocation and fracture    Assessment: Left second toe infection  Left lower leg and foot cellulitis  Left second toe fracture/dislocation    Plan: To the OR for second toe amputation  Plan discussed with the patient who agrees to proceed

## 2019-08-16 NOTE — CONSULTS
LIMB PRESERVATION SERVICE CONSULT      DATE OF CONSULTATION: 8/15/2019    REASON FOR CONSULT:  L 2nd toe    HISTORY OF PRESENT ILLNESS: Abdullahi Huynh is a 68 y.o.  with a past medical history that includes hypertension, alcohol abuse, gout, admitted 8/14/2019 for Foot infection.   [facility] has been consulted for left second toe ulcer.  Patient reports he stubbed his toe several months ago.  Soon after developed some redness and swelling but he did not have any pain.  Continued on with his normal routine.  He developed a blister beginning of August 2019.  He applied hydroperoxide, Neosporin, gauze every day.  The toe continued to swell and redness worsened.  He went to the hospital in [facility] on 8/13, x-ray was completed of toe and showed dislocation.  He was sent to [facility] for further care.      IV antibiotics were started on this admission.  Infectious diseases has  been consulted.    Xray completed at outside facility and was positive for dislocation of left second toe.  Ortho has not been consulted yet.    Denies having history of diabetes.  His A1c this admission is 7%.  He reports he has numbness to feet.  He does not check his feet routinely.  Does not have custom inserts.  Has not had previous foot surgeries.  Current occupation         Smoking:   reports that he has never smoked. He has never used smokeless tobacco.    Alcohol:   reports that he drinks alcohol.    Drug:   reports that he does not use drugs.      PAST MEDICAL HISTORY:   Past Medical History:   Diagnosis Date   • Hypertension         PAST SURGICAL HISTORY: History reviewed. No pertinent surgical history.    MEDICATIONS:   Current Facility-Administered Medications   Medication Dose   • senna-docusate (PERICOLACE or SENOKOT S) 8.6-50 MG per tablet 2 Tab  2 Tab    And   • polyethylene glycol/lytes (MIRALAX) PACKET 1 Packet  1 Packet    And   • magnesium hydroxide (MILK OF MAGNESIA) suspension 30 mL  30 mL    And   •  bisacodyl (DULCOLAX) suppository 10 mg  10 mg   • heparin injection 5,000 Units  5,000 Units   • ondansetron (ZOFRAN) syringe/vial injection 4 mg  4 mg   • ondansetron (ZOFRAN ODT) dispertab 4 mg  4 mg   • NS (BOLUS) infusion 500 mL  500 mL   • ampicillin/sulbactam (UNASYN) 3 g in  mL IVPB  3 g   • MD Alert...Vancomycin per Pharmacy  1 Each   • Pharmacy Consult Request ...Pain Management Review 1 Each  1 Each   • acetaminophen (TYLENOL) tablet 1,000 mg  1,000 mg   • oxyCODONE immediate release (ROXICODONE) tablet 10 mg  10 mg   • morphine (pf) 4 mg/ml injection 4 mg  4 mg   • oxyCODONE immediate-release (ROXICODONE) tablet 5 mg  5 mg   • thiamine tablet 100 mg  100 mg    And   • multivitamin (THERAGRAN) tablet 1 Tab  1 Tab    And   • folic acid (FOLVITE) tablet 1 mg  1 mg   • LORazepam (ATIVAN) tablet 0.5 mg  0.5 mg   • LORazepam (ATIVAN) tablet 1 mg  1 mg    Or   • LORazepam (ATIVAN) injection 0.5 mg  0.5 mg   • LORazepam (ATIVAN) tablet 2 mg  2 mg    Or   • LORazepam (ATIVAN) injection 1 mg  1 mg   • LORazepam (ATIVAN) tablet 3 mg  3 mg    Or   • LORazepam (ATIVAN) injection 1.5 mg  1.5 mg   • LORazepam (ATIVAN) tablet 4 mg  4 mg    Or   • LORazepam (ATIVAN) injection 2 mg  2 mg   • vancomycin (VANCOCIN) 1,400 mg in  mL IVPB  15 mg/kg   • insulin regular (HUMULIN R) injection 1-6 Units  1-6 Units    And   • glucose 4 g chewable tablet 16 g  16 g    And   • DEXTROSE 10% BOLUS 250 mL  250 mL       ALLERGIES:  No Known Allergies     FAMILY HISTORY: History reviewed. No pertinent family history.      REVIEW OF SYSTEMS:   Constitutional: Negative for chills, fever   Respiratory: Negative for cough and shortness of breath.    Cardiovascular:Negative for chest pain, and claudication.   Gastrointestinal: Negative for constipation, diarrhea, nausea and vomiting.   Lower extremities: positive for swelling and redness  Neurological: positive for numbness to feet and lower legs      RESULTS:         Recent Labs     " 08/15/19  0555   SODIUM 127*   POTASSIUM 3.4*   CHLORIDE 89*   CO2 29   GLUCOSE 120*   BUN 14         ESR:   57    CRP:   No record in epic    X-ray: At outside facility, middle second toe left foot dislocated dorsally.  No fracture    MRI: Second PIP dorsal lateral dislocation with slight middle phalanx base plantar impaction fracture    Severe second toe centered skin and subcutaneous enhancement is compatible with cellulitis and phlegmon versus posttraumatic reactive change. No high-grade bony destruction is seen to indicate long-standing osteomyelitis. Unfortunately, subacute   dislocation as can be seen with neuropathic or posttraumatic origin has considerable overlap with infection.    Fluid tracking along the proximal second phalanx most likely is reactive. Superinfection/abscess is not excluded in the event there has been skin breakdown or infection is suspected    Arterial studies: None on record    A1c:  Lab Results   Component Value Date/Time    HBA1C 7.0 (H) 08/15/2019 05:55 AM            Microbiology:  Results     ** No results found for the last 168 hours. **           PHYSICAL EXAMINATION:     VITAL SIGNS: /66   Pulse 76   Temp 37.5 °C (99.5 °F) (Temporal)   Resp 18   Ht 1.88 m (6' 2\")   Wt 90.7 kg (200 lb)   SpO2 96%   BMI 25.68 kg/m²       General Appearance:  Well developed, well nourished, in no acute distress  Obese  Lower Extremity Assessment:    Edema:   +3 LLE    Pulses:  R foot: +3 DP, +3 PT  L foot: +3 DP, +3 PT    ROM dorsi/plantarflexion  Intact    Structural /mechanical changes:  Intact    Sensory Assessment  Feet Insensate to light touch  Left worse than right    Wound Assessment:  Left second toe medial ulcer:  Measures 1 x 2 x 0.9 cm.  Palpates to bone  Heavy serosanguineous semi-purulent drainage with foul odor  Severe edema and erythema      Erythema extending to just below the knee.  Erythema has been previously marked and appears to be receding from marked " line    Skin intact to left first toe and left third toe    Dry roll gauze applied to left second toe/foot.      Education:   - Implications of loss of protective sensation (LOPS) discussed with patient- including increased risk for amputation.  Advised to check feet at least daily, moisturize feet, and to always wear protective foot wear.   -avoid trimming own nails. See podiatrist or certified foot and nail RN  -Discussed his A1c, diabetes keep blood sugars <150 for improved wound healing      Surgical and nonsurgical options discussed with patient.  Surgical options including left second toe amputation possible ray amputation depending on presentation in surgery were discussed.   Patient in agreement that left second toe needs to be amputated.    ASSESSMENT AND PLAN:   Left second toe with clinical osteomyelitis, foul odor, cellulitis      Wound:  Dry gauze dressing applied      Imaging:  No further imaging    Vascular Status:   +3 DP and +3 PT bilaterally      Surgery:   NPO midnight. Recommend L 2nd toe amputation  Discussed with Dr. Escalante    Offloading:  Offloading shoe ordered    Weight bearing:   LLE HWB      Infection: ID involved. LLE cellulitis, bone easily palpated    PT/OT: To be determined after surgery    Diabetes: consult CDE and RD       Discharge Plan:  Lives at [facility]    Please note that this dictation was created using voice recognition software. I have  worked with technical experts from [employer] to optimize the interface.  I have made every reasonable attempt to correct obvious errors, but there may be errors of grammar and possibly content that I did not discover before finalizing the note.

## 2019-08-17 LAB
ALBUMIN SERPL BCP-MCNC: 2.9 G/DL (ref 3.2–4.9)
ALBUMIN/GLOB SERPL: 1.1 G/DL
ALP SERPL-CCNC: 82 U/L (ref 30–99)
ALT SERPL-CCNC: 16 U/L (ref 2–50)
ANION GAP SERPL CALC-SCNC: 9 MMOL/L (ref 0–11.9)
AST SERPL-CCNC: 15 U/L (ref 12–45)
BASOPHILS # BLD AUTO: 0.3 % (ref 0–1.8)
BASOPHILS # BLD: 0.02 K/UL (ref 0–0.12)
BILIRUB SERPL-MCNC: 0.4 MG/DL (ref 0.1–1.5)
BUN SERPL-MCNC: 11 MG/DL (ref 8–22)
CALCIUM SERPL-MCNC: 7.7 MG/DL (ref 8.5–10.5)
CHLORIDE SERPL-SCNC: 98 MMOL/L (ref 96–112)
CO2 SERPL-SCNC: 25 MMOL/L (ref 20–33)
CREAT SERPL-MCNC: 1.03 MG/DL (ref 0.5–1.4)
EOSINOPHIL # BLD AUTO: 0.07 K/UL (ref 0–0.51)
EOSINOPHIL NFR BLD: 1.1 % (ref 0–6.9)
ERYTHROCYTE [DISTWIDTH] IN BLOOD BY AUTOMATED COUNT: 47.9 FL (ref 35.9–50)
GLOBULIN SER CALC-MCNC: 2.7 G/DL (ref 1.9–3.5)
GLUCOSE SERPL-MCNC: 92 MG/DL (ref 65–99)
HCT VFR BLD AUTO: 29.4 % (ref 42–52)
HGB BLD-MCNC: 10.2 G/DL (ref 14–18)
IMM GRANULOCYTES # BLD AUTO: 0.04 K/UL (ref 0–0.11)
IMM GRANULOCYTES NFR BLD AUTO: 0.6 % (ref 0–0.9)
LYMPHOCYTES # BLD AUTO: 1.41 K/UL (ref 1–4.8)
LYMPHOCYTES NFR BLD: 21.8 % (ref 22–41)
MAGNESIUM SERPL-MCNC: 1.6 MG/DL (ref 1.5–2.5)
MCH RBC QN AUTO: 32.1 PG (ref 27–33)
MCHC RBC AUTO-ENTMCNC: 34.7 G/DL (ref 33.7–35.3)
MCV RBC AUTO: 92.5 FL (ref 81.4–97.8)
MONOCYTES # BLD AUTO: 0.68 K/UL (ref 0–0.85)
MONOCYTES NFR BLD AUTO: 10.5 % (ref 0–13.4)
NEUTROPHILS # BLD AUTO: 4.25 K/UL (ref 1.82–7.42)
NEUTROPHILS NFR BLD: 65.7 % (ref 44–72)
NRBC # BLD AUTO: 0 K/UL
NRBC BLD-RTO: 0 /100 WBC
PLATELET # BLD AUTO: 224 K/UL (ref 164–446)
PMV BLD AUTO: 8.9 FL (ref 9–12.9)
POTASSIUM SERPL-SCNC: 3.9 MMOL/L (ref 3.6–5.5)
PROT SERPL-MCNC: 5.6 G/DL (ref 6–8.2)
RBC # BLD AUTO: 3.18 M/UL (ref 4.7–6.1)
SODIUM SERPL-SCNC: 132 MMOL/L (ref 135–145)
VANCOMYCIN TROUGH SERPL-MCNC: 20.5 UG/ML (ref 10–20)
WBC # BLD AUTO: 6.5 K/UL (ref 4.8–10.8)

## 2019-08-17 PROCEDURE — 80053 COMPREHEN METABOLIC PANEL: CPT

## 2019-08-17 PROCEDURE — 80202 ASSAY OF VANCOMYCIN: CPT

## 2019-08-17 PROCEDURE — 83735 ASSAY OF MAGNESIUM: CPT

## 2019-08-17 PROCEDURE — 700105 HCHG RX REV CODE 258: Performed by: HOSPITALIST

## 2019-08-17 PROCEDURE — 700102 HCHG RX REV CODE 250 W/ 637 OVERRIDE(OP): Performed by: INTERNAL MEDICINE

## 2019-08-17 PROCEDURE — 85025 COMPLETE CBC W/AUTO DIFF WBC: CPT

## 2019-08-17 PROCEDURE — 99233 SBSQ HOSP IP/OBS HIGH 50: CPT | Performed by: INTERNAL MEDICINE

## 2019-08-17 PROCEDURE — 99232 SBSQ HOSP IP/OBS MODERATE 35: CPT | Performed by: INTERNAL MEDICINE

## 2019-08-17 PROCEDURE — 700111 HCHG RX REV CODE 636 W/ 250 OVERRIDE (IP): Performed by: HOSPITALIST

## 2019-08-17 PROCEDURE — A9270 NON-COVERED ITEM OR SERVICE: HCPCS | Performed by: HOSPITALIST

## 2019-08-17 PROCEDURE — 700102 HCHG RX REV CODE 250 W/ 637 OVERRIDE(OP): Performed by: HOSPITALIST

## 2019-08-17 PROCEDURE — 700111 HCHG RX REV CODE 636 W/ 250 OVERRIDE (IP): Performed by: INTERNAL MEDICINE

## 2019-08-17 PROCEDURE — 700105 HCHG RX REV CODE 258: Performed by: INTERNAL MEDICINE

## 2019-08-17 PROCEDURE — 770006 HCHG ROOM/CARE - MED/SURG/GYN SEMI*

## 2019-08-17 PROCEDURE — 36415 COLL VENOUS BLD VENIPUNCTURE: CPT

## 2019-08-17 PROCEDURE — A9270 NON-COVERED ITEM OR SERVICE: HCPCS | Performed by: INTERNAL MEDICINE

## 2019-08-17 RX ORDER — MAGNESIUM SULFATE HEPTAHYDRATE 40 MG/ML
4 INJECTION, SOLUTION INTRAVENOUS ONCE
Status: COMPLETED | OUTPATIENT
Start: 2019-08-17 | End: 2019-08-17

## 2019-08-17 RX ADMIN — ACETAMINOPHEN 1000 MG: 500 TABLET ORAL at 17:59

## 2019-08-17 RX ADMIN — AMPICILLIN SODIUM AND SULBACTAM SODIUM 3 G: 2; 1 INJECTION, POWDER, FOR SOLUTION INTRAMUSCULAR; INTRAVENOUS at 17:59

## 2019-08-17 RX ADMIN — VANCOMYCIN HYDROCHLORIDE 1400 MG: 500 INJECTION, POWDER, LYOPHILIZED, FOR SOLUTION INTRAVENOUS at 04:20

## 2019-08-17 RX ADMIN — ACETAMINOPHEN 1000 MG: 500 TABLET ORAL at 06:27

## 2019-08-17 RX ADMIN — HEPARIN SODIUM 5000 UNITS: 5000 INJECTION, SOLUTION INTRAVENOUS; SUBCUTANEOUS at 22:00

## 2019-08-17 RX ADMIN — HEPARIN SODIUM 5000 UNITS: 5000 INJECTION, SOLUTION INTRAVENOUS; SUBCUTANEOUS at 06:27

## 2019-08-17 RX ADMIN — HEPARIN SODIUM 5000 UNITS: 5000 INJECTION, SOLUTION INTRAVENOUS; SUBCUTANEOUS at 14:48

## 2019-08-17 RX ADMIN — ACETAMINOPHEN 1000 MG: 500 TABLET ORAL at 14:47

## 2019-08-17 RX ADMIN — MAGNESIUM SULFATE IN WATER 4 G: 40 INJECTION, SOLUTION INTRAVENOUS at 10:17

## 2019-08-17 RX ADMIN — SENNOSIDES, DOCUSATE SODIUM 2 TABLET: 50; 8.6 TABLET, FILM COATED ORAL at 17:59

## 2019-08-17 RX ADMIN — Medication 100 MG: at 06:27

## 2019-08-17 RX ADMIN — THERA TABS 1 TABLET: TAB at 06:27

## 2019-08-17 RX ADMIN — FOLIC ACID 1 MG: 1 TABLET ORAL at 06:27

## 2019-08-17 RX ADMIN — AMPICILLIN SODIUM AND SULBACTAM SODIUM 3 G: 2; 1 INJECTION, POWDER, FOR SOLUTION INTRAMUSCULAR; INTRAVENOUS at 14:49

## 2019-08-17 RX ADMIN — AMPICILLIN SODIUM AND SULBACTAM SODIUM 3 G: 2; 1 INJECTION, POWDER, FOR SOLUTION INTRAMUSCULAR; INTRAVENOUS at 06:39

## 2019-08-17 RX ADMIN — VANCOMYCIN HYDROCHLORIDE 900 MG: 500 INJECTION, POWDER, LYOPHILIZED, FOR SOLUTION INTRAVENOUS at 21:59

## 2019-08-17 RX ADMIN — AMPICILLIN SODIUM AND SULBACTAM SODIUM 3 G: 2; 1 INJECTION, POWDER, FOR SOLUTION INTRAMUSCULAR; INTRAVENOUS at 00:01

## 2019-08-17 RX ADMIN — ACETAMINOPHEN 1000 MG: 500 TABLET ORAL at 00:01

## 2019-08-17 ASSESSMENT — ENCOUNTER SYMPTOMS
FLANK PAIN: 0
MYALGIAS: 1
HEARTBURN: 0
NAUSEA: 0
DOUBLE VISION: 0
HEMOPTYSIS: 0
HALLUCINATIONS: 0
SPUTUM PRODUCTION: 0
VOMITING: 0
NERVOUS/ANXIOUS: 0
FEVER: 0
BACK PAIN: 0
CHILLS: 0
COUGH: 0
FOCAL WEAKNESS: 0
TREMORS: 0
HEADACHES: 0
PALPITATIONS: 0
PHOTOPHOBIA: 0
ABDOMINAL PAIN: 0
BLURRED VISION: 0
WEIGHT LOSS: 0
POLYDIPSIA: 0
CONSTIPATION: 0
DIARRHEA: 0
NECK PAIN: 0
BRUISES/BLEEDS EASILY: 0
ORTHOPNEA: 0
SPEECH CHANGE: 0

## 2019-08-17 ASSESSMENT — LIFESTYLE VARIABLES
TOTAL SCORE: 2
VISUAL DISTURBANCES: NOT PRESENT
ANXIETY: NO ANXIETY (AT EASE)
TOTAL SCORE: MILD ITCHING, PINS AND NEEDLES SENSATION, BURNING OR NUMBNESS
TREMOR: NO TREMOR
AGITATION: NORMAL ACTIVITY
ORIENTATION AND CLOUDING OF SENSORIUM: ORIENTED AND CAN DO SERIAL ADDITIONS
NAUSEA AND VOMITING: NO NAUSEA AND NO VOMITING
NAUSEA AND VOMITING: NO NAUSEA AND NO VOMITING
AUDITORY DISTURBANCES: NOT PRESENT
AUDITORY DISTURBANCES: NOT PRESENT
HEADACHE, FULLNESS IN HEAD: NOT PRESENT
SUBSTANCE_ABUSE: 0
HEADACHE, FULLNESS IN HEAD: NOT PRESENT
TREMOR: NO TREMOR
ANXIETY: NO ANXIETY (AT EASE)
ORIENTATION AND CLOUDING OF SENSORIUM: ORIENTED AND CAN DO SERIAL ADDITIONS
VISUAL DISTURBANCES: NOT PRESENT
TOTAL SCORE: 0
PAROXYSMAL SWEATS: NO SWEAT VISIBLE
AGITATION: NORMAL ACTIVITY
PAROXYSMAL SWEATS: NO SWEAT VISIBLE

## 2019-08-17 NOTE — PROGRESS NOTES
No wound vac problem since machine changed.  Pt denied numbness or tingling sensation.  Pt denied pain.

## 2019-08-17 NOTE — WOUND TEAM
Wound team in to see pt for leaking VAC, noted clotted blood under drape and streaks of blood on foot.  Cleaned foot with wash cloth.  Removed lifted drape and TRAC pad.  Removed clotted blood with tweezers.  Sponge over wound left intact.  Draped skin, created new bridge and button with black foam, placed TRAC pad and obtained seal of 125 mmHg continuous.

## 2019-08-17 NOTE — PROGRESS NOTES
Hospital Medicine Daily Progress Note    Date of Service  8/17/2019    Chief Complaint/Hospital Course  68 y.o. man with a history of gout, chronic hyponatremia, hypertension and ongoing alcohol abuse with lower extremity neuropathy admitted 8/14/2019 for worsening left foot edema and erythema.            Interval Problem Update  patient in no distress.  No evidence of alcohol withdrawal on exam.  Left foot pain well controlled.  Patient's wife at bedside.  I discussed POC, including upcoming repeat I&D and wound closure as mentioned in Dr. Escalante note.  Surgical culture negative up to date.  I attempted to obtain a new wound culture possibly taken at outside facility before transferring patient, unable to get answer from medical records department  Blood sugar is under control  Disposition  To be discussed  Will need repeat I&D in 3 to 4 days with closure?.  Continue wound vacuum care      Consultants  Infectious disease  Outpatient    Code Status  Full code    Chief Complaint  Left foot pain and ulcer    Review of Systems  Review of Systems   Constitutional: Negative for chills, fever and weight loss.   HENT: Negative for ear pain, hearing loss and tinnitus.    Eyes: Negative for blurred vision, double vision and photophobia.   Respiratory: Negative for cough, hemoptysis and sputum production.    Cardiovascular: Negative for chest pain, palpitations and orthopnea.   Gastrointestinal: Negative for abdominal pain, heartburn, nausea and vomiting.   Genitourinary: Negative for dysuria, flank pain, frequency and hematuria.   Musculoskeletal: Positive for joint pain. Negative for back pain and neck pain.   Skin: Negative for itching and rash.   Neurological: Negative for tremors, speech change, focal weakness and headaches.   Endo/Heme/Allergies: Negative for environmental allergies and polydipsia. Does not bruise/bleed easily.   Psychiatric/Behavioral: Negative for hallucinations and substance abuse. The patient is  not nervous/anxious.         Physical Exam  Temp:  [36.7 °C (98 °F)-37.1 °C (98.7 °F)] 36.9 °C (98.4 °F)  Pulse:  [70-81] 72  Resp:  [16-18] 18  BP: (109-130)/(65-85) 130/85  SpO2:  [92 %-97 %] 95 %    Physical Exam   Constitutional: He is oriented to person, place, and time. He appears well-developed and well-nourished.   HENT:   Head: Normocephalic and atraumatic.   Eyes: Pupils are equal, round, and reactive to light. EOM are normal.   Neck: Normal range of motion. Neck supple.   Cardiovascular: Normal rate, regular rhythm and normal heart sounds.   Pulmonary/Chest: Effort normal and breath sounds normal.   Abdominal: Soft. Bowel sounds are normal.   Musculoskeletal: Normal range of motion.   Wound vacuum over left second toe amputation site.  Subcutaneous discharge.  Erythema and swelling over the dorsal foot improved comparing yesterday   Neurological: He is alert and oriented to person, place, and time.   Skin: Skin is warm and dry.   Psychiatric: He has a normal mood and affect.   Nursing note and vitals reviewed.  I examined the patient on 8/17.  No significant changes except as documented    Fluids    Intake/Output Summary (Last 24 hours) at 8/17/2019 1127  Last data filed at 8/17/2019 0709  Gross per 24 hour   Intake 1160 ml   Output 500 ml   Net 660 ml       Laboratory  Recent Labs     08/16/19  0203 08/17/19  0035   WBC 8.5 6.5   RBC 3.24* 3.18*   HEMOGLOBIN 10.1* 10.2*   HEMATOCRIT 29.7* 29.4*   MCV 91.7 92.5   MCH 31.2 32.1   MCHC 34.0 34.7   RDW 47.5 47.9   PLATELETCT 220 224   MPV 9.0 8.9*     Recent Labs     08/16/19  0203 08/16/19  1549 08/17/19  0035   SODIUM 133* 134* 132*   POTASSIUM 2.8* 2.9* 3.9   CHLORIDE 96 95* 98   CO2 27 30 25   GLUCOSE 89 180* 92   BUN 12 9 11   CREATININE 0.83 1.03 1.03   CALCIUM 8.1* 7.8* 7.7*     Recent Labs     08/16/19  1217   APTT 35.2   INR 1.06               Imaging  MR-FOOT-WITH & W/O LEFT   Final Result      Second PIP dorsal lateral dislocation with slight  middle phalanx base plantar impaction fracture      Severe second toe centered skin and subcutaneous enhancement is compatible with cellulitis and phlegmon versus posttraumatic reactive change. No high-grade bony destruction is seen to indicate long-standing osteomyelitis. Unfortunately, subacute    dislocation as can be seen with neuropathic or posttraumatic origin has considerable overlap with infection.      Fluid tracking along the proximal second phalanx most likely is reactive. Superinfection/abscess is not excluded in the event there has been skin breakdown or infection is suspected      Moderate first metatarsal-phalangeal osteoarthritis      OUTSIDE IMAGES-DX LOWER EXTREMITY, LEFT   Final Result           Assessment/Plan  * Left foot infection  Assessment & Plan  Started on Unasyn and vancomycin, will continue  ID consulted, appreciate recommendation  Will need to follow with wound culture report from outside facility  MRI of the left foot showed osteomyelitis and abscess of the left second toe  Orthopedic surgery consulted  8/16 amputation of second toe  Follow-up with surgical culture  Continue antibiotics  Plan to repeat I&D and wound closure in 3 to 4 days  Clinically improving    Hypokalemia  Assessment & Plan  Severe.  Replaced p.o. and IV  Magnesium also replaced    Diabetes type 2, controlled (HCC)  Assessment & Plan  A1c 7.0  Diabetes education  Metformin upon discharge    Alcohol abuse  Assessment & Plan  The patient reports drinking up to a 12 pack of beer per day, history of alcohol withdrawal  Start on CIWA protocol  Monitor for withdrawal, no evidence of withdrawal  Vitamins  Fall, aspiration, seizure precautions    Hyponatremia  Assessment & Plan  Acute on chronic.  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  Also potassium sparing diuretics contributes  Sodium increased from 115-127 in the course of 24 hours his IV fluids  Asymptomatic  Monitor  8/16 sodium 133  8/17 stable    HTN  (hypertension)  Assessment & Plan  Resume home Dyazide.  Will discontinue triamterene due to tendency to hyponatremia  Blood pressure is stable       VTE prophylaxis: Heparin

## 2019-08-17 NOTE — PROGRESS NOTES
Entered pt's room for bedside report, found wound vac off.  Per day shift RN, it started to alarm low battery about 1845.  This RN turned wound vac on and displaying low battery.  Called central and got new wound vac machine.  Switched over and set therapy to continuous 125 mmHg.

## 2019-08-17 NOTE — PROGRESS NOTES
Infectious Disease Progress Note    Author: Arabella Beasley M.D. Date & Time of service: 2019  4:31 PM    Chief Complaint:  Left foot infection    Interval History:    Review of Systems:  Review of Systems   Constitutional: Negative for chills, fever and malaise/fatigue.   Gastrointestinal: Negative for abdominal pain, constipation, diarrhea, nausea and vomiting.   Musculoskeletal: Positive for myalgias. Negative for joint pain.       Hemodynamics:  Temp (24hrs), Av.8 °C (98.3 °F), Min:36.7 °C (98 °F), Max:37.1 °C (98.7 °F)  Temperature: 36.9 °C (98.4 °F)  Pulse  Av  Min: 67  Max: 110   Blood Pressure : 130/85       Physical Exam:  Physical Exam   Constitutional: He is oriented to person, place, and time. He appears well-developed and well-nourished.   HENT:   Head: Normocephalic and atraumatic.   Eyes: Pupils are equal, round, and reactive to light. Conjunctivae and EOM are normal.   Cardiovascular: Normal rate, regular rhythm and normal heart sounds.   Pulmonary/Chest: Effort normal and breath sounds normal.   Abdominal: Soft. Bowel sounds are normal. He exhibits no distension. There is no rebound and no guarding.   Musculoskeletal: He exhibits edema, tenderness and deformity.   Left foot and lower leg with erythema, edema, warmth, mild tenderness.  Wound VAC in place, status post second toe amputation   Neurological: He is alert and oriented to person, place, and time.   Skin: Skin is warm and dry.   Psychiatric: He has a normal mood and affect. His behavior is normal.       Meds:    Current Facility-Administered Medications:   •  vancomycin  •  senna-docusate **AND** polyethylene glycol/lytes **AND** magnesium hydroxide **AND** bisacodyl  •  heparin  •  ondansetron  •  ondansetron  •  NS  •  ampicillin-sulbactam (UNASYN) IV  •  MD Alert...Vancomycin per Pharmacy  •  Pharmacy Consult Request  •  acetaminophen  •  oxyCODONE immediate release  •  morphine injection  •  oxyCODONE  immediate-release  •  thiamine **AND** multivitamin **AND** folic acid    Labs:  Recent Labs     08/16/19  0203 08/17/19  0035   WBC 8.5 6.5   RBC 3.24* 3.18*   HEMOGLOBIN 10.1* 10.2*   HEMATOCRIT 29.7* 29.4*   MCV 91.7 92.5   MCH 31.2 32.1   RDW 47.5 47.9   PLATELETCT 220 224   MPV 9.0 8.9*   NEUTSPOLYS  --  65.70   LYMPHOCYTES  --  21.80*   MONOCYTES  --  10.50   EOSINOPHILS  --  1.10   BASOPHILS  --  0.30     Recent Labs     08/16/19  0203 08/16/19  1549 08/17/19  0035   SODIUM 133* 134* 132*   POTASSIUM 2.8* 2.9* 3.9   CHLORIDE 96 95* 98   CO2 27 30 25   GLUCOSE 89 180* 92   BUN 12 9 11     Recent Labs     08/16/19  0203 08/16/19  1549 08/17/19  0035   ALBUMIN  --   --  2.9*   TBILIRUBIN  --   --  0.4   ALKPHOSPHAT  --   --  82   TOTPROTEIN  --   --  5.6*   ALTSGPT  --   --  16   ASTSGOT  --   --  15   CREATININE 0.83 1.03 1.03       Imaging:  Mr-foot-with & W/o Left    Result Date: 8/15/2019  8/15/2019 9:47 AM HISTORY/REASON FOR EXAM:  Toe dislocation months ago. Now erythematous and swelling. TECHNIQUE/EXAM DESCRIPTION: MRI of the LEFT foot with and without contrast. Using a Julia 3.0 Olivia MRI scanner, T1 axial, sagittal, and coronal, fast spin-echo T2 fat-suppressed axial and coronal, fast inversion recovery sagittal, and T1 fat suppressed axial and sagittal post intravenous contrast images were obtained. A total  of 20 mL ProHance given. COMPARISON:  Radiographs yesterday from an outside facility. FINDINGS: Motion degrades the study There is dorsal dislocation at the second PIP joint. There is half shaft width lateral subluxation. There is a slight impaction fracture at the middle phalanx base. There is severe second toe soft tissue swelling. Some fluid surrounds the proximal phalanx and is noted in the plantar soft tissues There is exuberant skin enhancement with subcutaneous fat edema especially on the dorsal lateral margins of the patella. Edema and enhancement is less pronounced in the dorsum of the  forefoot No other fracture or subluxation is seen There is moderate severity first metatarsal-phalangeal marginal spurring, cartilage thinning, sesamoid spurring. Mild effusion. Subchondral cyst formation in the metatarsal head, favored over acute erosion. No other abnormal fluid collection is detected. Ligaments/tendons: No evidence of ligament or tendon injury outside of the second ray. The flexor and extensor tendons crossing the dislocated PIP joint are not confirmed intact. Motion artifact making analysis suboptimal. The flexor tendon most likely is intact suggested on the coronal sequence.     Second PIP dorsal lateral dislocation with slight middle phalanx base plantar impaction fracture Severe second toe centered skin and subcutaneous enhancement is compatible with cellulitis and phlegmon versus posttraumatic reactive change. No high-grade bony destruction is seen to indicate long-standing osteomyelitis. Unfortunately, subacute dislocation as can be seen with neuropathic or posttraumatic origin has considerable overlap with infection. Fluid tracking along the proximal second phalanx most likely is reactive. Superinfection/abscess is not excluded in the event there has been skin breakdown or infection is suspected Moderate first metatarsal-phalangeal osteoarthritis      Micro:  Results     Procedure Component Value Units Date/Time    CULTURE TISSUE W/ GRM STAIN [[id number]]  (Abnormal) Collected:  08/16/19 0925    Order Status:  Completed Specimen:  Bone Updated:  08/17/19 1332     Significant Indicator POS     Source BONE     Site Left Second Toe Bone     Culture Result -     Gram Stain Result Many WBCs.  Rare Gram positive cocci.       Culture Result Coagulase-negative Staphylococcus species  Rare growth      Narrative:       Surgery Specimen    Anaerobic Culture [[id number]] Collected:  08/16/19 0925    Order Status:  Completed Specimen:  Bone Updated:  08/17/19 1332     Significant Indicator NEG     Source  BONE     Site Left Second Toe Bone     Culture Result Culture in progress.    Narrative:       Surgery Specimen    CULTURE WOUND W/ GRAM STAIN [[id number]]  (Abnormal) Collected:  08/16/19 0923    Order Status:  Completed Specimen:  Wound Updated:  08/17/19 1332     Significant Indicator NEG     Source WND     Site Left Second Toe Pus     Culture Result No growth at 24 hours.     Gram Stain Result Many WBCs.  Rare Gram positive cocci.      Narrative:       Surgery - swabs received    Anaerobic Culture [[id number]] Collected:  08/16/19 0923    Order Status:  Completed Specimen:  Wound Updated:  08/17/19 1332     Significant Indicator NEG     Source WND     Site Left Second Toe Pus     Culture Result Culture in progress.    Narrative:       Surgery - swabs received    GRAM STAIN [[id number]] Collected:  08/16/19 0925    Order Status:  Completed Specimen:  Bone Updated:  08/16/19 1335     Significant Indicator .     Source BONE     Site Left Second Toe Bone     Gram Stain Result Many WBCs.  Rare Gram positive cocci.      Narrative:       Surgery Specimen    GRAM STAIN [[id number]] Collected:  08/16/19 0923    Order Status:  Completed Specimen:  Wound Updated:  08/16/19 1335     Significant Indicator .     Source WND     Site Left Second Toe Pus     Gram Stain Result Many WBCs.  Rare Gram positive cocci.      Narrative:       Surgery - swabs received          Assessment:  Active Hospital Problems    Diagnosis   • *Left foot infection [L08.9]   • HTN (hypertension) [I10]   • Hypokalemia [E87.6]   • Hyponatremia [E87.1]   • Alcohol abuse [F10.10]   • Diabetes type 2, controlled (HCC) [E11.9]     Interval 24 hour assessment:    AF, O2 RA,    Labs reviewed  Micro reviewed    Pt continued on vancomycin and Unasyn.  He has some ongoing pain in the foot but much improved.  Still with erythema and edema in the lower leg and foot but he states it is improving.         Abdullahi Huynh is a 68 y.o. male with a history of  long-standing alcohol abuse complicated by peripheral neuropathy and newly diagnosed type 2 diabetes mellitus admitted for nonhealing wounds of the left foot and worsening edema and erythema of the left lower extremity.    He went to the OR on 8/16 with Dr. Escalante for amputation of left second toe through the medical carpal phalangeal joint as well as I&D of left second toe abscess.  Cultures were obtained but no pathology for margins. Wound was left open and wound VAC placed.      Left foot infection                -OR cultures from bone with coag negative staph   -Plan is to go back to the OR next week for possible further debridement or closure  Type 2 diabetes mellitus, newly diagnosed  Peripheral neuropathy  Alcohol abuse        Plan:    --- Continue vancomycin and Unasyn for now  --- Follow-up OR cultures  --- Will call outside facility to obtain wound culture      Discussed with internal medicine.

## 2019-08-17 NOTE — PROGRESS NOTES
"Pharmacy Kinetics 68 y.o. male on vancomycin day # 3  2019    Currently on Vancomycin 900mg iv q12hr (1000, 2200)    19: Trough level 20.5 mcg/mL, Dose reduced and re-timed.      Provider specified end date: 6 weeks     Indication for Treatment: L second toe osteomyelitis w/associated abscess.      Pertinent history per medical record: Admitted on 2019 for osteomyelitis work up. Patient stubbed toe two months ago. Did not seek treatment at time. Now has abscess and imaging concerning for osteomyelitis. Sent from OSH for evaluation. Received 2g ceftriaxone prior to arrival.      Other antibiotics: unasyn 3g IV q6h     Allergies: Patient has no known allergies.      List concerns for renal function: Age, Electrolyte derangements     Pertinent cultures to date:   19: L 2nd toe wound cx - in process   19: L 2nd toe bone cx - coag negative staph   19: Associated anaerobic cx's - in process        Recent Labs     19  0203 19  0035   WBC 8.5 6.5   NEUTSPOLYS  --  65.70     Recent Labs     08/15/19  0555 19  0203 19  1549 19  0035   BUN 14 12 9 11   CREATININE 0.85 0.83 1.03 1.03   ALBUMIN  --   --   --  2.9*     Recent Labs     19  1522   VANCOTROUGH 20.5*       Intake/Output Summary (Last 24 hours) at 2019 1655  Last data filed at 2019 0709  Gross per 24 hour   Intake 1160 ml   Output 500 ml   Net 660 ml      /85   Pulse 72   Temp 36.9 °C (98.4 °F) (Temporal)   Resp 18   Ht 1.88 m (6' 2\")   Wt 90.7 kg (200 lb)   SpO2 95%  Temp (24hrs), Av.8 °C (98.3 °F), Min:36.7 °C (98 °F), Max:37.1 °C (98.7 °F)      A/P   1. Vancomycin dose change: Yes.   2. Next vancomycin level: Prior to 4th dose of new regimen.   3. Goal trough: 12 - 16 mcg/mL   4. Comments: SCr stable from yesterday - CTM. VS stable. Remains afebrile, without leukocytosis. Bone culture resulting with Coag negative staph species. Other cultures remain in process. ID " consulting.     Brooke Kaiser, PharmD

## 2019-08-18 ENCOUNTER — ANESTHESIA EVENT (OUTPATIENT)
Dept: SURGERY | Facility: MEDICAL CENTER | Age: 69
DRG: 617 | End: 2019-08-18
Payer: MEDICARE

## 2019-08-18 ENCOUNTER — ANESTHESIA (OUTPATIENT)
Dept: SURGERY | Facility: MEDICAL CENTER | Age: 69
DRG: 617 | End: 2019-08-18
Payer: MEDICARE

## 2019-08-18 LAB
ANION GAP SERPL CALC-SCNC: 7 MMOL/L (ref 0–11.9)
BACTERIA TISS AEROBE CULT: ABNORMAL
BACTERIA TISS AEROBE CULT: ABNORMAL
BASOPHILS # BLD AUTO: 0.7 % (ref 0–1.8)
BASOPHILS # BLD: 0.03 K/UL (ref 0–0.12)
BUN SERPL-MCNC: 8 MG/DL (ref 8–22)
CALCIUM SERPL-MCNC: 8.1 MG/DL (ref 8.5–10.5)
CHLORIDE SERPL-SCNC: 101 MMOL/L (ref 96–112)
CO2 SERPL-SCNC: 26 MMOL/L (ref 20–33)
CREAT SERPL-MCNC: 0.84 MG/DL (ref 0.5–1.4)
EKG IMPRESSION: NORMAL
EOSINOPHIL # BLD AUTO: 0.09 K/UL (ref 0–0.51)
EOSINOPHIL NFR BLD: 2 % (ref 0–6.9)
ERYTHROCYTE [DISTWIDTH] IN BLOOD BY AUTOMATED COUNT: 49.3 FL (ref 35.9–50)
GLUCOSE BLD-MCNC: 76 MG/DL (ref 65–99)
GLUCOSE SERPL-MCNC: 93 MG/DL (ref 65–99)
GRAM STN SPEC: ABNORMAL
HCT VFR BLD AUTO: 31.5 % (ref 42–52)
HGB BLD-MCNC: 10.3 G/DL (ref 14–18)
IMM GRANULOCYTES # BLD AUTO: 0.04 K/UL (ref 0–0.11)
IMM GRANULOCYTES NFR BLD AUTO: 0.9 % (ref 0–0.9)
LYMPHOCYTES # BLD AUTO: 1.64 K/UL (ref 1–4.8)
LYMPHOCYTES NFR BLD: 36.9 % (ref 22–41)
MCH RBC QN AUTO: 31.1 PG (ref 27–33)
MCHC RBC AUTO-ENTMCNC: 32.7 G/DL (ref 33.7–35.3)
MCV RBC AUTO: 95.2 FL (ref 81.4–97.8)
MONOCYTES # BLD AUTO: 0.52 K/UL (ref 0–0.85)
MONOCYTES NFR BLD AUTO: 11.7 % (ref 0–13.4)
NEUTROPHILS # BLD AUTO: 2.12 K/UL (ref 1.82–7.42)
NEUTROPHILS NFR BLD: 47.8 % (ref 44–72)
NRBC # BLD AUTO: 0 K/UL
NRBC BLD-RTO: 0 /100 WBC
PLATELET # BLD AUTO: 263 K/UL (ref 164–446)
PMV BLD AUTO: 8.5 FL (ref 9–12.9)
POTASSIUM SERPL-SCNC: 3.5 MMOL/L (ref 3.6–5.5)
RBC # BLD AUTO: 3.31 M/UL (ref 4.7–6.1)
SIGNIFICANT IND 70042: ABNORMAL
SITE SITE: ABNORMAL
SODIUM SERPL-SCNC: 134 MMOL/L (ref 135–145)
SOURCE SOURCE: ABNORMAL
WBC # BLD AUTO: 4.4 K/UL (ref 4.8–10.8)

## 2019-08-18 PROCEDURE — 501838 HCHG SUTURE GENERAL: Performed by: ORTHOPAEDIC SURGERY

## 2019-08-18 PROCEDURE — 700105 HCHG RX REV CODE 258: Performed by: INTERNAL MEDICINE

## 2019-08-18 PROCEDURE — 160027 HCHG SURGERY MINUTES - 1ST 30 MINS LEVEL 2: Performed by: ORTHOPAEDIC SURGERY

## 2019-08-18 PROCEDURE — 700102 HCHG RX REV CODE 250 W/ 637 OVERRIDE(OP): Performed by: INTERNAL MEDICINE

## 2019-08-18 PROCEDURE — A9270 NON-COVERED ITEM OR SERVICE: HCPCS | Performed by: INTERNAL MEDICINE

## 2019-08-18 PROCEDURE — 160002 HCHG RECOVERY MINUTES (STAT): Performed by: ORTHOPAEDIC SURGERY

## 2019-08-18 PROCEDURE — 770006 HCHG ROOM/CARE - MED/SURG/GYN SEMI*

## 2019-08-18 PROCEDURE — 0QBR0ZZ EXCISION OF LEFT TOE PHALANX, OPEN APPROACH: ICD-10-PCS | Performed by: ORTHOPAEDIC SURGERY

## 2019-08-18 PROCEDURE — 700111 HCHG RX REV CODE 636 W/ 250 OVERRIDE (IP): Performed by: HOSPITALIST

## 2019-08-18 PROCEDURE — A6222 GAUZE <=16 IN NO W/SAL W/O B: HCPCS | Performed by: ORTHOPAEDIC SURGERY

## 2019-08-18 PROCEDURE — 93005 ELECTROCARDIOGRAM TRACING: CPT | Performed by: ANESTHESIOLOGY

## 2019-08-18 PROCEDURE — 700111 HCHG RX REV CODE 636 W/ 250 OVERRIDE (IP): Performed by: INTERNAL MEDICINE

## 2019-08-18 PROCEDURE — 700105 HCHG RX REV CODE 258: Performed by: HOSPITALIST

## 2019-08-18 PROCEDURE — 99232 SBSQ HOSP IP/OBS MODERATE 35: CPT | Performed by: INTERNAL MEDICINE

## 2019-08-18 PROCEDURE — 80048 BASIC METABOLIC PNL TOTAL CA: CPT

## 2019-08-18 PROCEDURE — 160036 HCHG PACU - EA ADDL 30 MINS PHASE I: Performed by: ORTHOPAEDIC SURGERY

## 2019-08-18 PROCEDURE — 36415 COLL VENOUS BLD VENIPUNCTURE: CPT

## 2019-08-18 PROCEDURE — 160038 HCHG SURGERY MINUTES - EA ADDL 1 MIN LEVEL 2: Performed by: ORTHOPAEDIC SURGERY

## 2019-08-18 PROCEDURE — 160009 HCHG ANES TIME/MIN: Performed by: ORTHOPAEDIC SURGERY

## 2019-08-18 PROCEDURE — 160035 HCHG PACU - 1ST 60 MINS PHASE I: Performed by: ORTHOPAEDIC SURGERY

## 2019-08-18 PROCEDURE — 501445 HCHG STAPLER, SKIN DISP: Performed by: ORTHOPAEDIC SURGERY

## 2019-08-18 PROCEDURE — 82962 GLUCOSE BLOOD TEST: CPT

## 2019-08-18 PROCEDURE — 700111 HCHG RX REV CODE 636 W/ 250 OVERRIDE (IP): Performed by: ANESTHESIOLOGY

## 2019-08-18 PROCEDURE — 93010 ELECTROCARDIOGRAM REPORT: CPT | Performed by: INTERNAL MEDICINE

## 2019-08-18 PROCEDURE — 85025 COMPLETE CBC W/AUTO DIFF WBC: CPT

## 2019-08-18 PROCEDURE — 501330 HCHG SET, CYSTO IRRIG TUBING: Performed by: ORTHOPAEDIC SURGERY

## 2019-08-18 PROCEDURE — A6454 SELF-ADHER BAND W>=3" <5"/YD: HCPCS | Performed by: ORTHOPAEDIC SURGERY

## 2019-08-18 PROCEDURE — 700105 HCHG RX REV CODE 258: Performed by: ANESTHESIOLOGY

## 2019-08-18 PROCEDURE — 500881 HCHG PACK, EXTREMITY: Performed by: ORTHOPAEDIC SURGERY

## 2019-08-18 PROCEDURE — 160048 HCHG OR STATISTICAL LEVEL 1-5: Performed by: ORTHOPAEDIC SURGERY

## 2019-08-18 PROCEDURE — 700101 HCHG RX REV CODE 250: Performed by: ANESTHESIOLOGY

## 2019-08-18 RX ORDER — SODIUM CHLORIDE, SODIUM LACTATE, POTASSIUM CHLORIDE, CALCIUM CHLORIDE 600; 310; 30; 20 MG/100ML; MG/100ML; MG/100ML; MG/100ML
INJECTION, SOLUTION INTRAVENOUS
Status: DISCONTINUED | OUTPATIENT
Start: 2019-08-18 | End: 2019-08-18 | Stop reason: SURG

## 2019-08-18 RX ORDER — LABETALOL HYDROCHLORIDE 5 MG/ML
5 INJECTION, SOLUTION INTRAVENOUS
Status: DISCONTINUED | OUTPATIENT
Start: 2019-08-18 | End: 2019-08-18 | Stop reason: HOSPADM

## 2019-08-18 RX ORDER — OXYCODONE HYDROCHLORIDE AND ACETAMINOPHEN 5; 325 MG/1; MG/1
1 TABLET ORAL
Status: DISCONTINUED | OUTPATIENT
Start: 2019-08-18 | End: 2019-08-18 | Stop reason: HOSPADM

## 2019-08-18 RX ORDER — DIPHENHYDRAMINE HYDROCHLORIDE 50 MG/ML
12.5 INJECTION INTRAMUSCULAR; INTRAVENOUS
Status: DISCONTINUED | OUTPATIENT
Start: 2019-08-18 | End: 2019-08-18 | Stop reason: HOSPADM

## 2019-08-18 RX ORDER — ONDANSETRON 2 MG/ML
INJECTION INTRAMUSCULAR; INTRAVENOUS PRN
Status: DISCONTINUED | OUTPATIENT
Start: 2019-08-18 | End: 2019-08-18 | Stop reason: SURG

## 2019-08-18 RX ORDER — HYDRALAZINE HYDROCHLORIDE 20 MG/ML
5 INJECTION INTRAMUSCULAR; INTRAVENOUS
Status: DISCONTINUED | OUTPATIENT
Start: 2019-08-18 | End: 2019-08-18 | Stop reason: HOSPADM

## 2019-08-18 RX ORDER — ONDANSETRON 2 MG/ML
4 INJECTION INTRAMUSCULAR; INTRAVENOUS
Status: DISCONTINUED | OUTPATIENT
Start: 2019-08-18 | End: 2019-08-18 | Stop reason: HOSPADM

## 2019-08-18 RX ORDER — MAGNESIUM SULFATE HEPTAHYDRATE 40 MG/ML
4 INJECTION, SOLUTION INTRAVENOUS ONCE
Status: DISCONTINUED | OUTPATIENT
Start: 2019-08-18 | End: 2019-08-18 | Stop reason: CLARIF

## 2019-08-18 RX ORDER — HALOPERIDOL 5 MG/ML
1 INJECTION INTRAMUSCULAR
Status: DISCONTINUED | OUTPATIENT
Start: 2019-08-18 | End: 2019-08-18 | Stop reason: HOSPADM

## 2019-08-18 RX ORDER — HYDROMORPHONE HYDROCHLORIDE 1 MG/ML
0.4 INJECTION, SOLUTION INTRAMUSCULAR; INTRAVENOUS; SUBCUTANEOUS
Status: DISCONTINUED | OUTPATIENT
Start: 2019-08-18 | End: 2019-08-18 | Stop reason: HOSPADM

## 2019-08-18 RX ORDER — SODIUM CHLORIDE, SODIUM LACTATE, POTASSIUM CHLORIDE, CALCIUM CHLORIDE 600; 310; 30; 20 MG/100ML; MG/100ML; MG/100ML; MG/100ML
INJECTION, SOLUTION INTRAVENOUS CONTINUOUS
Status: DISCONTINUED | OUTPATIENT
Start: 2019-08-18 | End: 2019-08-18 | Stop reason: HOSPADM

## 2019-08-18 RX ORDER — OXYCODONE HYDROCHLORIDE AND ACETAMINOPHEN 5; 325 MG/1; MG/1
2 TABLET ORAL
Status: DISCONTINUED | OUTPATIENT
Start: 2019-08-18 | End: 2019-08-18 | Stop reason: HOSPADM

## 2019-08-18 RX ORDER — LIDOCAINE HYDROCHLORIDE 20 MG/ML
JELLY TOPICAL PRN
Status: DISCONTINUED | OUTPATIENT
Start: 2019-08-18 | End: 2019-08-18 | Stop reason: SURG

## 2019-08-18 RX ORDER — LIDOCAINE HYDROCHLORIDE 20 MG/ML
INJECTION, SOLUTION EPIDURAL; INFILTRATION; INTRACAUDAL; PERINEURAL PRN
Status: DISCONTINUED | OUTPATIENT
Start: 2019-08-18 | End: 2019-08-18 | Stop reason: SURG

## 2019-08-18 RX ORDER — MEPERIDINE HYDROCHLORIDE 25 MG/ML
6.25 INJECTION INTRAMUSCULAR; INTRAVENOUS; SUBCUTANEOUS
Status: DISCONTINUED | OUTPATIENT
Start: 2019-08-18 | End: 2019-08-18 | Stop reason: HOSPADM

## 2019-08-18 RX ORDER — HYDROMORPHONE HYDROCHLORIDE 1 MG/ML
0.1 INJECTION, SOLUTION INTRAMUSCULAR; INTRAVENOUS; SUBCUTANEOUS
Status: DISCONTINUED | OUTPATIENT
Start: 2019-08-18 | End: 2019-08-18 | Stop reason: HOSPADM

## 2019-08-18 RX ORDER — HYDROMORPHONE HYDROCHLORIDE 1 MG/ML
0.2 INJECTION, SOLUTION INTRAMUSCULAR; INTRAVENOUS; SUBCUTANEOUS
Status: DISCONTINUED | OUTPATIENT
Start: 2019-08-18 | End: 2019-08-18 | Stop reason: HOSPADM

## 2019-08-18 RX ORDER — POTASSIUM CHLORIDE 20 MEQ/1
40 TABLET, EXTENDED RELEASE ORAL ONCE
Status: COMPLETED | OUTPATIENT
Start: 2019-08-18 | End: 2019-08-18

## 2019-08-18 RX ADMIN — ACETAMINOPHEN 1000 MG: 500 TABLET ORAL at 18:39

## 2019-08-18 RX ADMIN — FOLIC ACID 1 MG: 1 TABLET ORAL at 09:39

## 2019-08-18 RX ADMIN — ACETAMINOPHEN 1000 MG: 500 TABLET ORAL at 23:17

## 2019-08-18 RX ADMIN — AMPICILLIN SODIUM AND SULBACTAM SODIUM 3 G: 2; 1 INJECTION, POWDER, FOR SOLUTION INTRAMUSCULAR; INTRAVENOUS at 23:17

## 2019-08-18 RX ADMIN — THERA TABS 1 TABLET: TAB at 09:39

## 2019-08-18 RX ADMIN — HEPARIN SODIUM 5000 UNITS: 5000 INJECTION, SOLUTION INTRAVENOUS; SUBCUTANEOUS at 15:09

## 2019-08-18 RX ADMIN — AMPICILLIN SODIUM AND SULBACTAM SODIUM 3 G: 2; 1 INJECTION, POWDER, FOR SOLUTION INTRAMUSCULAR; INTRAVENOUS at 00:17

## 2019-08-18 RX ADMIN — PROPOFOL 200 MG: 10 INJECTION, EMULSION INTRAVENOUS at 07:08

## 2019-08-18 RX ADMIN — LIDOCAINE HYDROCHLORIDE 2 ML: 20 JELLY TOPICAL at 07:08

## 2019-08-18 RX ADMIN — VANCOMYCIN HYDROCHLORIDE 900 MG: 500 INJECTION, POWDER, LYOPHILIZED, FOR SOLUTION INTRAVENOUS at 20:54

## 2019-08-18 RX ADMIN — AMPICILLIN SODIUM AND SULBACTAM SODIUM 3 G: 2; 1 INJECTION, POWDER, FOR SOLUTION INTRAMUSCULAR; INTRAVENOUS at 05:11

## 2019-08-18 RX ADMIN — AMPICILLIN SODIUM AND SULBACTAM SODIUM 3 G: 2; 1 INJECTION, POWDER, FOR SOLUTION INTRAMUSCULAR; INTRAVENOUS at 12:45

## 2019-08-18 RX ADMIN — ACETAMINOPHEN 1000 MG: 500 TABLET ORAL at 12:45

## 2019-08-18 RX ADMIN — LIDOCAINE HYDROCHLORIDE 5 ML: 20 INJECTION, SOLUTION EPIDURAL; INFILTRATION; INTRACAUDAL at 07:08

## 2019-08-18 RX ADMIN — HEPARIN SODIUM 5000 UNITS: 5000 INJECTION, SOLUTION INTRAVENOUS; SUBCUTANEOUS at 20:54

## 2019-08-18 RX ADMIN — FENTANYL CITRATE 50 MCG: 50 INJECTION, SOLUTION INTRAMUSCULAR; INTRAVENOUS at 07:07

## 2019-08-18 RX ADMIN — Medication 100 MG: at 09:39

## 2019-08-18 RX ADMIN — ONDANSETRON 4 MG: 2 INJECTION INTRAMUSCULAR; INTRAVENOUS at 07:14

## 2019-08-18 RX ADMIN — AMPICILLIN SODIUM AND SULBACTAM SODIUM 3 G: 2; 1 INJECTION, POWDER, FOR SOLUTION INTRAMUSCULAR; INTRAVENOUS at 18:39

## 2019-08-18 RX ADMIN — SODIUM CHLORIDE, POTASSIUM CHLORIDE, SODIUM LACTATE AND CALCIUM CHLORIDE: 600; 310; 30; 20 INJECTION, SOLUTION INTRAVENOUS at 06:53

## 2019-08-18 RX ADMIN — VANCOMYCIN HYDROCHLORIDE 900 MG: 500 INJECTION, POWDER, LYOPHILIZED, FOR SOLUTION INTRAVENOUS at 09:39

## 2019-08-18 RX ADMIN — ACETAMINOPHEN 1000 MG: 500 TABLET ORAL at 00:16

## 2019-08-18 RX ADMIN — POTASSIUM CHLORIDE 40 MEQ: 1500 TABLET, EXTENDED RELEASE ORAL at 09:39

## 2019-08-18 ASSESSMENT — ENCOUNTER SYMPTOMS
NERVOUS/ANXIOUS: 0
TREMORS: 0
ABDOMINAL PAIN: 0
NECK PAIN: 0
VOMITING: 0
FOCAL WEAKNESS: 0
PHOTOPHOBIA: 0
HEMOPTYSIS: 0
HALLUCINATIONS: 0
COUGH: 0
HEADACHES: 0
ORTHOPNEA: 0
FLANK PAIN: 0
BRUISES/BLEEDS EASILY: 0
SPEECH CHANGE: 0
BACK PAIN: 0
FEVER: 0
POLYDIPSIA: 0
DOUBLE VISION: 0
HEARTBURN: 0
NAUSEA: 0
SPUTUM PRODUCTION: 0
WEIGHT LOSS: 0
BLURRED VISION: 0
PALPITATIONS: 0
CHILLS: 0

## 2019-08-18 ASSESSMENT — LIFESTYLE VARIABLES: SUBSTANCE_ABUSE: 0

## 2019-08-18 NOTE — ANESTHESIA TIME REPORT
Anesthesia Start and Stop Event Times     Date Time Event    8/18/2019 0648 Ready for Procedure     0702 Anesthesia Start     0739 Anesthesia Stop        Responsible Staff  08/18/19    Name Role Begin End    Mario Ellison M.D. Anesth 0702 0739        Preop Diagnosis (Free Text):  Pre-op Diagnosis     osteomyelitis left        Preop Diagnosis (Codes):    Post op Diagnosis  Acute osteomyelitis of left foot (HCC)      Premium Reason  E. Weekend    Comments:

## 2019-08-18 NOTE — PROGRESS NOTES
This RN notified Dr. Lee that patient has offloading boot at bedside from admit. Dr. Escalante ordered this AM diabetic off-loading boot. This RN wanted to clarify on which

## 2019-08-18 NOTE — DIETARY
Nutrition Services: DM diet education consult received. Pt currently NPO and to go to OR today for left foot infection, RD to follow for diet education as feasible.

## 2019-08-18 NOTE — OR NURSING
"Patient states he occasionally feels some pain in toe area of left foot; then \"goes away\".  Declined pain medication  Capillary refill less than 2 sec; toes pink; dressing dry and intact  Tolerating fluids well; no complaints of nausea.  "

## 2019-08-18 NOTE — OP REPORT
DATE OF SERVICE:  08/18/2019    PREOPERATIVE DIAGNOSES:  Left second toe osteomyelitis, status post amputation   and application of wound VAC to residual open wound.    POSTOPERATIVE DIAGNOSES:  Left second toe osteomyelitis, status post   amputation and application of wound VAC to residual open wound.    PROCEDURE PERFORMED:  1.  Excisional debridement of left foot wound measuring 3x2 cm including skin,   subcutaneous tissue, and bone.  2.  Secondary closure of left foot wound.    SURGEON:  Vitaly Escalante MD    ANESTHESIOLOGIST:  Mario Ellison MD    ANESTHESIA:  General.    ESTIMATED BLOOD LOSS:  Minimal.    INDICATION FOR PROCEDURE:  The patient is a 68-year-old male.  He had a left   second toe osteomyelitis with extensive soft tissue abscess.  I took him to   the operating room 2 days ago for amputation of the second toe by   disarticulation through his metatarsophalangeal joint.  He had extensive   purulent drainage consistent with abscess around the soft tissues.  I left the   wound open and applied a wound VAC to the 3x2 cm wound and also to the   incisional portion of the wound, I placed an incisional wound VAC.  I   recommended return to the operating room for repeat debridement and possible   secondary closure versus wound VAC change today.  He signed informed consent   preoperatively and wished to proceed as outlined above.    DESCRIPTION OF PROCEDURE:  The patient was met in the preoperative holding   area and his surgical site was signed.  His consent was confirmed for   accuracy.  He was taken back to the operating room and general anesthesia was   induced.  The left lower extremity had his wound VAC removed.  There was some   hematoma present within the wound itself, but no significant active bleeding   was present after the wound VAC sponge was removed.  There was no evidence of   obvious residual purulence present.  The left lower extremity was prepped and   draped in the usual sterile  fashion.  A formal timeout was performed to   confirm patient's correct name, correct surgical site, correct procedure and   correct laterality.  I thoroughly irrigated the wound and debrided   subcutaneous tissue where there was some early granulation tissue present   peripherally.  There was a small oozing from the deep tissues, which were   ligated with small amount of punctate Bovie cautery.  Thorough lavage of the   wound was performed with cystotubing using 2-3 liters normal saline.  I then   felt that given the wound appeared relatively clean without obvious gross   purulence or signs of infection that he would benefit from secondary closure,   which I performed with 3-0 nylon without undue tension on the skin.  I then   applied Xeroform, 4x4s, Kerlix and a compressive Ace bandage to the incisional   site.  He was then awoken from anesthesia and [place] the [place] and   taken to the postanesthesia care unit in stable condition.    PLAN:  1.  The patient will be readmitted to the medical service postop.  2.  He should be heel weightbearing only in the diabetic boot.  3.  Recommend continuing antibiotic therapy to treat any potential residual   soft tissue infection.  4.  He should follow up with me in 3 weeks for routine wound check and suture   removal.       ____________________________________     MD JAIRON Webster / GENE    DD:  08/18/2019 07:41:54  DT:  08/18/2019 09:02:16    D#:  [id number]  Job#:  [id number]

## 2019-08-18 NOTE — PROGRESS NOTES
68yoM with left second toe osteomyelitis s/p amputation and wound vac application 8/16.      S: NPO awaiting surgery, doing well    O:    Vitals:    08/18/19 0633   BP: (!) 173/85   Pulse: 73   Resp: 16   Temp: 36.7 °C (98 °F)   SpO2: 95%     Exam:  General-NAD, alert and following commands  LLE-foot VAC with good seal at amputation site, BCR in remaining toes    A: 68yoM with left second toe osteomyelitis s/p amputation and wound vac application 8/16.      Recs:  --Planning repeat I&D and VAC change vs closure in OR this am.

## 2019-08-18 NOTE — ANESTHESIA QCDR
2019 [facility] Clinical Data Registry (for Quality Improvement)     Postoperative nausea/vomiting risk protocol (Adult = 18 yrs and Pediatric 3-17 yrs)- (430 and 463)  General inhalation anesthetic (NOT TIVA) with PONV risk factors: No  Provision of anti-emetic therapy with at least 2 different classes of agents: N/A  Patient DID NOT receive anti-emetic therapy and reason is documented in Medical Record: N/A    Multimodal Pain Management- (AQI59)  Patient undergoing Elective Surgery (i.e. Outpatient, or ASC, or Prescheduled Surgery prior to Hospital Admission): No  Use of Multimodal Pain Management, two or more drugs and/or interventions, NOT including systemic opioids: N/A  Exception: Documented allergy to multiple classes of analgesics: N/A    PACU assessment of acute postoperative pain prior to Anesthesia Care End- Applies to Patients Age = 18- (ABG7)  Initial PACU pain score is which of the following: < 7/10  Patient unable to report pain score: N/A    Post-anesthetic transfer of care checklist/protocol to PACU/ICU- (426 and 427)  Upon conclusion of case, patient transferred to which of the following locations: PACU/Non-ICU  Use of transfer checklist/protocol: Yes  Exclusion: Service Performed in Patient Hospital Room (and thus did not require transfer): N/A    PACU Reintubation- (AQI31)  General anesthesia requiring endotracheal intubation (ETT) along with subsequent extubation in OR or PACU: No  Required reintubation in the PACU: N/A  Extubation was a planned trial documented in the medical record prior to removal of the original airway device: N/A    Unplanned admission to ICU related to anesthesia service up through end of PACU care- (MD51)  Unplanned admission to ICU (not initially anticipated at anesthesia start time): No

## 2019-08-18 NOTE — CARE PLAN
Problem: Infection  Goal: Will remain free from infection  Outcome: PROGRESSING AS EXPECTED   Getting IV antibiotics    Problem: Bowel/Gastric:  Goal: Normal bowel function is maintained or improved  Outcome: PROGRESSING AS EXPECTED   Last BM today per pt

## 2019-08-18 NOTE — ANESTHESIA PROCEDURE NOTES
Airway  Date/Time: 8/18/2019 7:09 AM  Performed by: Mario Ellison M.D.  Authorized by: Mario Ellison M.D.     Location:  OR  Urgency:  Elective  Difficult Airway: No    Indications for Airway Management:  Anesthesia  Spontaneous Ventilation: absent    Sedation Level:  Deep  Preoxygenated: Yes    Mask Difficulty Assessment:  0 - not attempted  Final Airway Type:  Supraglottic airway  Final Supraglottic Airway:  Standard LMA  SGA Size:  4  Number of Attempts at Approach:  1

## 2019-08-18 NOTE — PROGRESS NOTES
Hospital Medicine Daily Progress Note    Date of Service  8/18/2019    Chief Complaint/Hospital Course  68 y.o. man with a history of gout, chronic hyponatremia, hypertension and ongoing alcohol abuse with lower extremity neuropathy admitted 8/14/2019 for worsening left foot edema and erythema.            Interval Problem Update  8/17 patient in no distress.  No evidence of alcohol withdrawal on exam.  Left foot pain well controlled.  Patient's wife at bedside.  I discussed POC, including upcoming repeat I&D and wound closure as mentioned in Dr. Escalante note.  Surgical culture negative up to date.  I attempted to obtain a new wound culture possibly taken at outside facility before transferring patient, unable to get answer from medical records department  Blood sugar is under control  8/18  Patient had I&D and closure of the surgical wound post left second toe amputation this morning  He is not in pain according to him.  Surgical culture growing staph epidermidis.  No evidence of withdrawal  Discussed with the patient points of care, specifically possible discharge in the next 1 to 2 days pending infectious disease clearance antibiotic plan.  Potassium replaced  Will place referral to wound clinic    Disposition  Home with wound clinic follow-up    Consultants  Infectious disease  Outpatient    Code Status  Full code    Chief Complaint  Left foot pain and ulcer    Review of Systems  Review of Systems   Constitutional: Negative for chills, fever and weight loss.   HENT: Negative for ear pain, hearing loss and tinnitus.    Eyes: Negative for blurred vision, double vision and photophobia.   Respiratory: Negative for cough, hemoptysis and sputum production.    Cardiovascular: Negative for chest pain, palpitations and orthopnea.   Gastrointestinal: Negative for abdominal pain, heartburn, nausea and vomiting.   Genitourinary: Negative for dysuria, flank pain, frequency and hematuria.   Musculoskeletal: Positive for joint  pain. Negative for back pain and neck pain.   Skin: Negative for itching and rash.   Neurological: Negative for tremors, speech change, focal weakness and headaches.   Endo/Heme/Allergies: Negative for environmental allergies and polydipsia. Does not bruise/bleed easily.   Psychiatric/Behavioral: Negative for hallucinations and substance abuse. The patient is not nervous/anxious.         Physical Exam  Temp:  [36.3 °C (97.4 °F)-37.3 °C (99.1 °F)] 36.6 °C (97.8 °F)  Pulse:  [57-80] 64  Resp:  [12-20] 17  BP: (116-173)/(63-88) 149/88  SpO2:  [92 %-100 %] 93 %    Physical Exam   Constitutional: He is oriented to person, place, and time. He appears well-developed and well-nourished.   HENT:   Head: Normocephalic and atraumatic.   Eyes: Pupils are equal, round, and reactive to light. EOM are normal.   Neck: Normal range of motion. Neck supple.   Cardiovascular: Normal rate, regular rhythm and normal heart sounds.   Pulmonary/Chest: Effort normal and breath sounds normal.   Abdominal: Soft. Bowel sounds are normal.   Musculoskeletal: Normal range of motion.   Left foot surgical wound covered with sterile dressing   Neurological: He is alert and oriented to person, place, and time.   Skin: Skin is warm and dry.   Psychiatric: He has a normal mood and affect.   Nursing note and vitals reviewed.      Fluids    Intake/Output Summary (Last 24 hours) at 8/18/2019 1021  Last data filed at 8/18/2019 0855  Gross per 24 hour   Intake 2180 ml   Output 1410 ml   Net 770 ml       Laboratory  Recent Labs     08/16/19  0203 08/17/19  0035 08/18/19  0316   WBC 8.5 6.5 4.4*   RBC 3.24* 3.18* 3.31*   HEMOGLOBIN 10.1* 10.2* 10.3*   HEMATOCRIT 29.7* 29.4* 31.5*   MCV 91.7 92.5 95.2   MCH 31.2 32.1 31.1   MCHC 34.0 34.7 32.7*   RDW 47.5 47.9 49.3   PLATELETCT 220 224 263   MPV 9.0 8.9* 8.5*     Recent Labs     08/16/19  1549 08/17/19  0035 08/18/19  0316   SODIUM 134* 132* 134*   POTASSIUM 2.9* 3.9 3.5*   CHLORIDE 95* 98 101   CO2 30 25 26    GLUCOSE 180* 92 93   BUN 9 11 8   CREATININE 1.03 1.03 0.84   CALCIUM 7.8* 7.7* 8.1*     Recent Labs     08/16/19  1217   APTT 35.2   INR 1.06               Imaging  MR-FOOT-WITH & W/O LEFT   Final Result      Second PIP dorsal lateral dislocation with slight middle phalanx base plantar impaction fracture      Severe second toe centered skin and subcutaneous enhancement is compatible with cellulitis and phlegmon versus posttraumatic reactive change. No high-grade bony destruction is seen to indicate long-standing osteomyelitis. Unfortunately, subacute    dislocation as can be seen with neuropathic or posttraumatic origin has considerable overlap with infection.      Fluid tracking along the proximal second phalanx most likely is reactive. Superinfection/abscess is not excluded in the event there has been skin breakdown or infection is suspected      Moderate first metatarsal-phalangeal osteoarthritis      OUTSIDE IMAGES-DX LOWER EXTREMITY, LEFT   Final Result           Assessment/Plan  * Left foot infection  Assessment & Plan  Started on Unasyn and vancomycin, will continue  ID consulted, appreciate recommendation  Will need to follow with wound culture report from outside facility  MRI of the left foot showed osteomyelitis and abscess of the left second toe  Orthopedic surgery consulted  8/16 amputation of second toe  Follow-up with surgical culture  Continue antibiotics per ID  8/18 status post I&D and wound closure  Surgical culture growing staph epidermidis.  Patient is to heel weightbearing on the left foot.  Might need assistive devices  for safe discharge?  Will order PT OT    Hypokalemia  Assessment & Plan  Severe.  Replaced p.o. and IV  Magnesium also replaced    Diabetes type 2, controlled (HCC)  Assessment & Plan  A1c 7.0  Diabetes education  Metformin upon discharge    Alcohol abuse  Assessment & Plan  The patient reports drinking up to a 12 pack of beer per day, history of alcohol withdrawal  Start on  WA protocol  Monitor for withdrawal, no evidence of withdrawal  Vitamins  Fall, aspiration, seizure precautions    Hyponatremia  Assessment & Plan  Acute on chronic.  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  Also potassium sparing diuretics contributes  Sodium increased from 115-127 in the course of 24 hours his IV fluids  Asymptomatic  Monitor  8/16 sodium 133  8/17 stable  8/18 mild stable asymptomatic    HTN (hypertension)  Assessment & Plan  Resume home Dyazide.  Will discontinue triamterene due to tendency to hyponatremia  Blood pressure is stable       VTE prophylaxis: Heparin

## 2019-08-18 NOTE — PROGRESS NOTES
"Pharmacy Kinetics 68 y.o. male on vancomycin day # 4   2019    Currently on Vancomycin 900mg iv q12hr (1000, 2200)               19: Trough level 20.5 mcg/mL, Dose reduced and re-timed.      Provider specified end date: 6 weeks     Indication for Treatment: L second toe osteomyelitis w/associated abscess.      Pertinent history per medical record: Admitted on 2019 for osteomyelitis work up. Patient stubbed toe two months ago. Did not seek treatment at time. Now has abscess and imaging concerning for osteomyelitis. Sent from OSH for evaluation. Received 2g ceftriaxone prior to arrival.      Other antibiotics: unasyn 3g IV q6h     Allergies: Patient has no known allergies.      List concerns for renal function: Age, Electrolyte derangements, hypoalbuminemia      Pertinent cultures to date:   19: L 2nd toe wound cx - in process   19: L 2nd toe bone cx - coag negative staph   19: Associated anaerobic cx's - in process     Recent Labs     19  0203 19  0035 19  0316   WBC 8.5 6.5 4.4*   NEUTSPOLYS  --  65.70 47.80     Recent Labs     19  0203 19  1549 19  0035 19  0316   BUN 12 9 11 8   CREATININE 0.83 1.03 1.03 0.84   ALBUMIN  --   --  2.9*  --      Recent Labs     19  1522   VANCOTROUGH 20.5*       Intake/Output Summary (Last 24 hours) at 2019 1021  Last data filed at 2019 0855  Gross per 24 hour   Intake 2180 ml   Output 1410 ml   Net 770 ml      /88   Pulse 64   Temp 36.6 °C (97.8 °F) (Temporal)   Resp 17   Ht 1.88 m (6' 2\")   Wt 100.1 kg (220 lb 10.9 oz)   SpO2 93%  Temp (24hrs), Av.7 °C (98 °F), Min:36.3 °C (97.4 °F), Max:37.3 °C (99.1 °F)      A/P   1. Vancomycin dose change: No.   2. Next vancomycin level: Tomorrow, 19 at 0930   3. Goal trough: 12 - 16 mcg/mL   4. Comments: Return to OR for repeat I&D and closure with ortho surgery today. Renal function stable - indices trending downward following " vancomycin dose reduction yesterday. Cultures remain in process. ID consulting.     Brooke Kaiser, SalD

## 2019-08-18 NOTE — ANESTHESIA POSTPROCEDURE EVALUATION
Patient: Abdullahi Huynh    Procedure Summary     Date:  08/18/19 Room / Location:  [place] [address] / SURGERY [facility]    Anesthesia Start:  0702 Anesthesia Stop:  0739    Procedure:  IRRIGATION AND DEBRIDEMENT, WOUND - Foot (Left Toe) Diagnosis:  (osteomyelitis left)    Surgeon:  Vitaly Escalante M.D. Responsible Provider:  Mario Ellison M.D.    Anesthesia Type:  general ASA Status:  3          Final Anesthesia Type: general  Last vitals  BP   Blood Pressure : 151/85    Temp   36.4 °C (97.6 °F)    Pulse   Pulse: (!) 58, Heart Rate (Monitored): 60   Resp   12    SpO2   93 %      Anesthesia Post Evaluation    Patient location during evaluation: PACU  Patient participation: complete - patient participated  Level of consciousness: awake and alert    Airway patency: patent  Anesthetic complications: no  Cardiovascular status: hemodynamically stable  Respiratory status: acceptable  Hydration status: euvolemic    PONV: none           Nurse Pain Score: 0 (NPRS)

## 2019-08-18 NOTE — ANESTHESIA PREPROCEDURE EVALUATION
Left foot infection    Relevant Problems   CARDIAC   (+) HTN (hypertension)      ENDO   (+) Diabetes type 2, controlled (HCC)   EtOH abuse  Pre-DM per pt    Physical Exam    Airway   Mallampati: II  TM distance: >3 FB  Neck ROM: full       Cardiovascular - normal exam  Rhythm: regular  Rate: normal  (-) murmur     Dental - normal exam         Pulmonary - normal exam  Breath sounds clear to auscultation     Abdominal    Neurological - normal exam                 Anesthesia Plan    ASA 3       Plan - general       Airway plan will be LMA        Induction: intravenous    Postoperative Plan: Postoperative administration of opioids is intended.    Pertinent diagnostic labs and testing reviewed    Informed Consent:    Anesthetic plan and risks discussed with patient.    Use of blood products discussed with: patient whom consented to blood products.

## 2019-08-18 NOTE — OR SURGEON
Immediate Post OP Note    PreOp Diagnosis: Left 2nd toe amputation with open wound    PostOp Diagnosis: same    Procedure(s):  IRRIGATION AND DEBRIDEMENT SECONDARY CLOSURE, WOUND - Foot - Wound Class: Dirty or Infected    Surgeon(s):  Vitaly Escalante M.D.    Anesthesiologist/Type of Anesthesia:  Anesthesiologist: Mario Ellison M.D./General    Surgical Staff:  Circulator: Renetta Carlson R.N.  Scrub Person: Silver Talley  [employer]: Mariangel Butts R.N.    Specimens removed if any:  * No specimens in log *    Estimated Blood Loss: minimal    Findings: see dictation    Complications: none known    PLAN:  --readmit medicine postop  --heel weightbearing only in diabetic boot  --continue abx  --fu 3 weeks postop for wound check and suture removal        8/18/2019 7:35 AM Vitaly Escalante M.D.

## 2019-08-19 ENCOUNTER — PATIENT OUTREACH (OUTPATIENT)
Dept: HEALTH INFORMATION MANAGEMENT | Facility: OTHER | Age: 69
End: 2019-08-19

## 2019-08-19 VITALS
HEIGHT: 74 IN | RESPIRATION RATE: 15 BRPM | SYSTOLIC BLOOD PRESSURE: 151 MMHG | DIASTOLIC BLOOD PRESSURE: 89 MMHG | TEMPERATURE: 98.1 F | OXYGEN SATURATION: 94 % | HEART RATE: 64 BPM | WEIGHT: 220.68 LBS | BODY MASS INDEX: 28.32 KG/M2

## 2019-08-19 LAB
ANION GAP SERPL CALC-SCNC: 9 MMOL/L (ref 0–11.9)
BACTERIA SPEC ANAEROBE CULT: ABNORMAL
BACTERIA WND AEROBE CULT: ABNORMAL
BACTERIA WND AEROBE CULT: ABNORMAL
BASOPHILS # BLD AUTO: 0.4 % (ref 0–1.8)
BASOPHILS # BLD: 0.02 K/UL (ref 0–0.12)
BUN SERPL-MCNC: 5 MG/DL (ref 8–22)
CALCIUM SERPL-MCNC: 8.1 MG/DL (ref 8.5–10.5)
CHLORIDE SERPL-SCNC: 102 MMOL/L (ref 96–112)
CO2 SERPL-SCNC: 25 MMOL/L (ref 20–33)
CREAT SERPL-MCNC: 0.76 MG/DL (ref 0.5–1.4)
EOSINOPHIL # BLD AUTO: 0.1 K/UL (ref 0–0.51)
EOSINOPHIL NFR BLD: 2 % (ref 0–6.9)
ERYTHROCYTE [DISTWIDTH] IN BLOOD BY AUTOMATED COUNT: 47.7 FL (ref 35.9–50)
GLUCOSE SERPL-MCNC: 78 MG/DL (ref 65–99)
GRAM STN SPEC: ABNORMAL
HCT VFR BLD AUTO: 29.6 % (ref 42–52)
HGB BLD-MCNC: 10 G/DL (ref 14–18)
IMM GRANULOCYTES # BLD AUTO: 0.03 K/UL (ref 0–0.11)
IMM GRANULOCYTES NFR BLD AUTO: 0.6 % (ref 0–0.9)
LYMPHOCYTES # BLD AUTO: 1.71 K/UL (ref 1–4.8)
LYMPHOCYTES NFR BLD: 34.4 % (ref 22–41)
MCH RBC QN AUTO: 31.5 PG (ref 27–33)
MCHC RBC AUTO-ENTMCNC: 33.8 G/DL (ref 33.7–35.3)
MCV RBC AUTO: 93.4 FL (ref 81.4–97.8)
MONOCYTES # BLD AUTO: 0.67 K/UL (ref 0–0.85)
MONOCYTES NFR BLD AUTO: 13.5 % (ref 0–13.4)
NEUTROPHILS # BLD AUTO: 2.44 K/UL (ref 1.82–7.42)
NEUTROPHILS NFR BLD: 49.1 % (ref 44–72)
NRBC # BLD AUTO: 0 K/UL
NRBC BLD-RTO: 0 /100 WBC
PLATELET # BLD AUTO: 255 K/UL (ref 164–446)
PMV BLD AUTO: 8.2 FL (ref 9–12.9)
POTASSIUM SERPL-SCNC: 3.3 MMOL/L (ref 3.6–5.5)
RBC # BLD AUTO: 3.17 M/UL (ref 4.7–6.1)
SIGNIFICANT IND 70042: ABNORMAL
SITE SITE: ABNORMAL
SODIUM SERPL-SCNC: 136 MMOL/L (ref 135–145)
SOURCE SOURCE: ABNORMAL
VANCOMYCIN TROUGH SERPL-MCNC: 25.2 UG/ML (ref 10–20)
WBC # BLD AUTO: 5 K/UL (ref 4.8–10.8)

## 2019-08-19 PROCEDURE — 36415 COLL VENOUS BLD VENIPUNCTURE: CPT

## 2019-08-19 PROCEDURE — 700105 HCHG RX REV CODE 258: Performed by: HOSPITALIST

## 2019-08-19 PROCEDURE — 99239 HOSP IP/OBS DSCHRG MGMT >30: CPT | Performed by: INTERNAL MEDICINE

## 2019-08-19 PROCEDURE — 97161 PT EVAL LOW COMPLEX 20 MIN: CPT

## 2019-08-19 PROCEDURE — 700102 HCHG RX REV CODE 250 W/ 637 OVERRIDE(OP): Performed by: INTERNAL MEDICINE

## 2019-08-19 PROCEDURE — 80202 ASSAY OF VANCOMYCIN: CPT

## 2019-08-19 PROCEDURE — A9270 NON-COVERED ITEM OR SERVICE: HCPCS | Performed by: INTERNAL MEDICINE

## 2019-08-19 PROCEDURE — 700111 HCHG RX REV CODE 636 W/ 250 OVERRIDE (IP): Performed by: HOSPITALIST

## 2019-08-19 PROCEDURE — 700105 HCHG RX REV CODE 258: Performed by: INTERNAL MEDICINE

## 2019-08-19 PROCEDURE — 85025 COMPLETE CBC W/AUTO DIFF WBC: CPT

## 2019-08-19 PROCEDURE — 80048 BASIC METABOLIC PNL TOTAL CA: CPT

## 2019-08-19 PROCEDURE — 700111 HCHG RX REV CODE 636 W/ 250 OVERRIDE (IP): Performed by: INTERNAL MEDICINE

## 2019-08-19 PROCEDURE — 99233 SBSQ HOSP IP/OBS HIGH 50: CPT | Performed by: INTERNAL MEDICINE

## 2019-08-19 PROCEDURE — 97165 OT EVAL LOW COMPLEX 30 MIN: CPT

## 2019-08-19 RX ORDER — POTASSIUM CHLORIDE 20 MEQ/1
40 TABLET, EXTENDED RELEASE ORAL DAILY
Qty: 12 TAB | Refills: 0 | Status: SHIPPED | OUTPATIENT
Start: 2019-08-19 | End: 2019-08-25

## 2019-08-19 RX ORDER — POTASSIUM CHLORIDE 20 MEQ/1
40 TABLET, EXTENDED RELEASE ORAL DAILY
Status: DISCONTINUED | OUTPATIENT
Start: 2019-08-19 | End: 2019-08-19 | Stop reason: HOSPADM

## 2019-08-19 RX ORDER — AMOXICILLIN AND CLAVULANATE POTASSIUM 875; 125 MG/1; MG/1
1 TABLET, FILM COATED ORAL 2 TIMES DAILY
Qty: 20 TAB | Refills: 0 | Status: SHIPPED | OUTPATIENT
Start: 2019-08-19 | End: 2019-08-19 | Stop reason: SDUPTHER

## 2019-08-19 RX ORDER — AMOXICILLIN AND CLAVULANATE POTASSIUM 875; 125 MG/1; MG/1
1 TABLET, FILM COATED ORAL 2 TIMES DAILY
Qty: 28 TAB | Refills: 0 | Status: SHIPPED | OUTPATIENT
Start: 2019-08-19 | End: 2019-09-02

## 2019-08-19 RX ORDER — POTASSIUM CHLORIDE 20 MEQ/1
40 TABLET, EXTENDED RELEASE ORAL ONCE
Status: DISCONTINUED | OUTPATIENT
Start: 2019-08-19 | End: 2019-08-19

## 2019-08-19 RX ORDER — METFORMIN HYDROCHLORIDE 750 MG/1
750 TABLET, EXTENDED RELEASE ORAL DAILY
Qty: 30 TAB | Refills: 0 | Status: SHIPPED | OUTPATIENT
Start: 2019-08-19

## 2019-08-19 RX ORDER — LISINOPRIL 5 MG/1
5 TABLET ORAL DAILY
Qty: 30 TAB | Refills: 1 | Status: SHIPPED | OUTPATIENT
Start: 2019-08-19

## 2019-08-19 RX ADMIN — AMPICILLIN SODIUM AND SULBACTAM SODIUM 3 G: 2; 1 INJECTION, POWDER, FOR SOLUTION INTRAMUSCULAR; INTRAVENOUS at 04:52

## 2019-08-19 RX ADMIN — VANCOMYCIN HYDROCHLORIDE 900 MG: 500 INJECTION, POWDER, LYOPHILIZED, FOR SOLUTION INTRAVENOUS at 10:16

## 2019-08-19 RX ADMIN — AMPICILLIN SODIUM AND SULBACTAM SODIUM 3 G: 2; 1 INJECTION, POWDER, FOR SOLUTION INTRAMUSCULAR; INTRAVENOUS at 13:14

## 2019-08-19 RX ADMIN — ACETAMINOPHEN 1000 MG: 500 TABLET ORAL at 04:52

## 2019-08-19 RX ADMIN — POTASSIUM CHLORIDE 40 MEQ: 20 TABLET, EXTENDED RELEASE ORAL at 13:11

## 2019-08-19 RX ADMIN — ACETAMINOPHEN 1000 MG: 500 TABLET ORAL at 13:11

## 2019-08-19 RX ADMIN — HEPARIN SODIUM 5000 UNITS: 5000 INJECTION, SOLUTION INTRAVENOUS; SUBCUTANEOUS at 13:11

## 2019-08-19 RX ADMIN — HEPARIN SODIUM 5000 UNITS: 5000 INJECTION, SOLUTION INTRAVENOUS; SUBCUTANEOUS at 04:52

## 2019-08-19 ASSESSMENT — COGNITIVE AND FUNCTIONAL STATUS - GENERAL
SUGGESTED CMS G CODE MODIFIER MOBILITY: CJ
MOBILITY SCORE: 22
WALKING IN HOSPITAL ROOM: A LITTLE
CLIMB 3 TO 5 STEPS WITH RAILING: A LITTLE

## 2019-08-19 ASSESSMENT — ENCOUNTER SYMPTOMS
DIARRHEA: 0
ABDOMINAL PAIN: 0
CHILLS: 0
VOMITING: 0
MYALGIAS: 1
CONSTIPATION: 0
NAUSEA: 0
FEVER: 0

## 2019-08-19 ASSESSMENT — GAIT ASSESSMENTS
GAIT LEVEL OF ASSIST: SUPERVISED
DISTANCE (FEET): 50

## 2019-08-19 NOTE — THERAPY
"Physical Therapy Evaluation completed.   Bed Mobility:  Supine to Sit: Modified Independent  Transfers: Sit to Stand: Modified Independent  Gait: Level Of Assist: Supervised with No Equipment Needed       Plan of Care: Patient with no further skilled PT needs in the acute care setting at this time  Discharge Recommendations: Equipment: No Equipment Needed. Post-acute therapy Recommend outpatient physical therapy services to address higher level deficits.    Mr. Huynh is a 69 y/o male who presents to acute secondary to L 2nd toe infection s/p amputation. He is heel WB in offloading shoe. Modified shoe to properly offload wound. Pt able to perform gait, transfers, and bed mobility without assist. No AD. No additional acute PT needs. Recommend outpatient PT once cleared by MD. Patient will not be actively followed for physical therapy services at this time, however may be seen if requested by physician for 1 more visit within 30 days to address any discharge or equipment needs    See \"Rehab Therapy-Acute\" Patient Summary Report for complete documentation.     "

## 2019-08-19 NOTE — DISCHARGE INSTRUCTIONS
Discharge Instructions per Sunil Eagle M.D.      DIET: diabetic    ACTIVITY:as tolerated    DIAGNOSIS: Left foot infection          Discharge Instructions    Discharged to home by car with friend. Discharged via wheelchair, hospital escort: Refused.  Special equipment needed: Not Applicable    Be sure to schedule a follow-up appointment with your primary care doctor or any specialists as instructed.     Discharge Plan:   Influenza Vaccine Indication: Indicated: Not available from distributor/    I understand that a diet low in cholesterol, fat, and sodium is recommended for good health. Unless I have been given specific instructions below for another diet, I accept this instruction as my diet prescription.   Other diet: Diabetic      Special Instructions: None    · Is patient discharged on Warfarin / Coumadin?   No     Depression / Suicide Risk    As you are discharged from this [facility] facility, it is important to learn how to keep safe from harming yourself.    Recognize the warning signs:  · Abrupt changes in personality, positive or negative- including increase in energy   · Giving away possessions  · Change in eating patterns- significant weight changes-  positive or negative  · Change in sleeping patterns- unable to sleep or sleeping all the time   · Unwillingness or inability to communicate  · Depression  · Unusual sadness, discouragement and loneliness  · Talk of wanting to die  · Neglect of personal appearance   · Rebelliousness- reckless behavior  · Withdrawal from people/activities they love  · Confusion- inability to concentrate     If you or a loved one observes any of these behaviors or has concerns about self-harm, here's what you can do:  · Talk about it- your feelings and reasons for harming yourself  · Remove any means that you might use to hurt yourself (examples: pills, rope, extension cords, firearm)  · Get professional help from the community (Mental Health, Substance  Abuse, psychological counseling)  · Do not be alone:Call your Safe Contact- someone whom you trust who will be there for you.  · Call your local CRISIS HOTLINE 481-5537 or 268-695-6696  · Call your local Children's Mobile Crisis Response Team [place] (011) 391-4310 or www.Switchcam  · Call the toll free National Suicide Prevention Hotlines   · National Suicide Prevention Lifeline 453-160-[facility] (1770)  · National Hope Line Network 800-SUICIDE (331-3610)

## 2019-08-19 NOTE — PROGRESS NOTES
"Pharmacy Kinetics 68 y.o. male on vancomycin day # 5   2019    Currently on Vancomycin 1,000mg iv q24hr (1200) - starting tomorrow.               19: Trough level 20.5 mcg/mL, Dose reduced.   19: Trough level 25.2 mcg/mL, Frequency reduced.      Provider specified end date: 6 weeks     Indication for Treatment: L second toe osteomyelitis w/associated abscess.      Pertinent history per medical record: Admitted on 2019 for osteomyelitis work up. Patient stubbed toe two months ago. Did not seek treatment at time. Now has abscess and imaging concerning for osteomyelitis. Sent from OSH for evaluation. Received 2g ceftriaxone prior to arrival.      Other antibiotics: unasyn 3g IV q6h     Allergies: Patient has no known allergies.      List concerns for renal function: Age, Electrolyte derangements, hypoalbuminemia      Pertinent cultures to date:   19: L 2nd toe wound cx - Viridans Streptococcus  19: L 2nd toe bone cx - Staphylococcus epidermidis (Vanco ELLA = 2)  19: Associated anaerobic cx's - Peptoniphilus asaccharolyticus     Recent Labs     19  0035 19  0316 19  0043   WBC 6.5 4.4* 5.0   NEUTSPOLYS 65.70 47.80 49.10     Recent Labs     19  1549 19  0035 19  0316 19  0933   BUN 9 11 8 5*   CREATININE 1.03 1.03 0.84 0.76   ALBUMIN  --  2.9*  --   --      Recent Labs     19  1522 19  0933   VANCOTROUGH 20.5* 25.2*       Intake/Output Summary (Last 24 hours) at 2019 1443  Last data filed at 2019 1300  Gross per 24 hour   Intake 1350 ml   Output 2710 ml   Net -1360 ml      /84   Pulse 94   Temp 36.8 °C (98.3 °F) (Temporal)   Resp 16   Ht 1.88 m (6' 2\")   Wt 100.1 kg (220 lb 10.9 oz)   SpO2 94%  Temp (24hrs), Av.9 °C (98.5 °F), Min:36.7 °C (98 °F), Max:37.4 °C (99.3 °F)      A/P   1. Vancomycin dose change: Yes.   2. Next vancomycin level: Random level tomorrow morning,  at 0900  3. Goal trough: 12 - 16 " mcg/mL   4. Comments: Renal function remains stable. Repeat trough level remains above goal range as outlined above despite dose reduction - patient demonstrating accumulation as therapy continues. Reduced frequency of Vancomycin to every 24 hours. ID consulting - OK to change to Augmentin x 2 weeks on discharge with plan for outpatient f/u in 2 weeks.     Brooke Kaiser, PharmD

## 2019-08-19 NOTE — CARE PLAN
Problem: Pain Management  Goal: Pain level will decrease to patient's comfort goal  Outcome: PROGRESSING AS EXPECTED  Flowsheets (Taken 8/18/2019 2015)  Pain Rating Scale (NPRS): 3  Intervention: Follow pain managment plan developed in collaboration with patient and Interdisciplinary Team  Note:   Pt c/o mild pain to L foot. Tylenol 1000 mg Q6 controls pain.

## 2019-08-19 NOTE — CARE PLAN
Problem: Safety  Goal: Will remain free from injury  Outcome: PROGRESSING AS EXPECTED  Educated patient about fall prevention strategies. Educated patient to call and wait for staff assistance before attempting to ambulate. Verbalized understanding. Call light in use, belongings within reach,  bed locked in low position     Problem: Knowledge Deficit  Goal: Knowledge of disease process/condition, treatment plan, diagnostic tests, and medications will improve  Outcome: PROGRESSING AS EXPECTED   Educated patient on his plan of care including IV antibiotics, wound care, and discharge barriers

## 2019-08-19 NOTE — DISCHARGE SUMMARY
Discharge Summary    CHIEF COMPLAINT ON ADMISSION  Chief Complaint   Patient presents with   • Toe Pain   • Wound Infection       Reason for Admission  EMS     Admission Date  8/14/2019    CODE STATUS  Full Code    HPI & HOSPITAL COURSE  This is a 68 y.o. maleWith history of alcohol abuse, hypertension  , presented from outside facility with complaints of left second toe and left foot infection that started several weeks ago and was getting worse.  MRI of the left foot was done and it showed left second toe middle phalanx fracture and dislocation and was inconclusive regarding possible abscess and osteomyelitis.  Orthopedic surgery consulted and patient had amputation of the second left toe with wound vacuum placement on 8/16.  Patient had second I&D with wound closure on 8/18.  Patient was diagnosed with new onset diabetes type 2.  Diabetes education provided and patient was started on metformin.  Surgical culture grew staph epidermidis.  Per ID, patient to continue Augmentin for 2 weeks and follow in ID clinic to assess clinical response to antibiotic.  Patient is to follow with orthopedic surgery in 3 weeks postop for wound check and suture removal.  Heel weightbearing only in diabetic foot.  triamterene was stopped due to hyponatremia and lisinopril started for hypertension.    Therefore, he is discharged in good and stable condition to home with close outpatient follow-up.    The patient met 2-midnight criteria for an inpatient stay at the time of discharge.    Discharge Date  8/19/2019    FOLLOW UP ITEMS POST DISCHARGE  Wound clinic  Orthopedic surgery in 3 weeks  Infectious disease clinic in 2 weeks    DISCHARGE DIAGNOSES  Principal Problem:    Left foot infection POA: Unknown  Active Problems:    Hyponatremia POA: Unknown    Alcohol abuse POA: Unknown    Diabetes type 2, controlled (HCC) POA: Unknown    Hypokalemia POA: Unknown    HTN (hypertension) POA: Unknown  Resolved Problems:    * No resolved hospital  problems. *      FOLLOW UP  No future appointments.  Primary Care Provider       Please call to set up an appointment with your primary care provider once back home in [place]. Thank you       MEDICATIONS ON DISCHARGE     Medication List      START taking these medications      Instructions   amoxicillin-clavulanate 875-125 MG Tabs  Commonly known as:  AUGMENTIN   Take 1 Tab by mouth 2 times a day for 14 days.  Dose:  1 Tab     lisinopril 5 MG Tabs  Commonly known as:  PRINIVIL   Take 1 Tab by mouth every day.  Dose:  5 mg     metFORMIN  MG Tb24  Commonly known as:  GLUCOPHAGE XR   Take 1 Tab by mouth every day.  Dose:  750 mg     potassium chloride SA 20 MEQ Tbcr  Commonly known as:  Kdur   Take 2 Tabs by mouth every day for 6 days.  Dose:  40 mEq        STOP taking these medications    NON SPECIFIED     triamterene 50 MG Caps  Commonly known as:  DYRENIUM            Allergies  No Known Allergies    DIET  Orders Placed This Encounter   Procedures   • Diet Order Diabetic     Standing Status:   Standing     Number of Occurrences:   1     Order Specific Question:   Diet:     Answer:   Diabetic [3]       ACTIVITY  As tolerated.  heel weightbearing in diabetic foot on the left foot    CONSULTATIONS  LPS  ID  Orthopedic surgery    PROCEDURES  PROCEDURE PERFORMED 8/16:  1.  Amputation of left second toe through metacarpophalangeal joint.  2.  Incision and drainage of left second toe abscess.  3.  Application of wound VAC less than 50 square cm.       IRRIGATION AND DEBRIDEMENT SECONDARY CLOSURE, WOUND 8/18    LABORATORY  Lab Results   Component Value Date    SODIUM 136 08/19/2019    POTASSIUM 3.3 (L) 08/19/2019    CHLORIDE 102 08/19/2019    CO2 25 08/19/2019    GLUCOSE 78 08/19/2019    BUN 5 (L) 08/19/2019    CREATININE 0.76 08/19/2019        Lab Results   Component Value Date    WBC 5.0 08/19/2019    HEMOGLOBIN 10.0 (L) 08/19/2019    HEMATOCRIT 29.6 (L) 08/19/2019    PLATELETCT 255 08/19/2019      MR-FOOT-WITH & W/O  LEFT   Final Result      Second PIP dorsal lateral dislocation with slight middle phalanx base plantar impaction fracture      Severe second toe centered skin and subcutaneous enhancement is compatible with cellulitis and phlegmon versus posttraumatic reactive change. No high-grade bony destruction is seen to indicate long-standing osteomyelitis. Unfortunately, subacute    dislocation as can be seen with neuropathic or posttraumatic origin has considerable overlap with infection.      Fluid tracking along the proximal second phalanx most likely is reactive. Superinfection/abscess is not excluded in the event there has been skin breakdown or infection is suspected      Moderate first metatarsal-phalangeal osteoarthritis      OUTSIDE IMAGES-DX LOWER EXTREMITY, LEFT   Final Result          MR-FOOT-WITH & W/O LEFT   Final Result      Second PIP dorsal lateral dislocation with slight middle phalanx base plantar impaction fracture      Severe second toe centered skin and subcutaneous enhancement is compatible with cellulitis and phlegmon versus posttraumatic reactive change. No high-grade bony destruction is seen to indicate long-standing osteomyelitis. Unfortunately, subacute    dislocation as can be seen with neuropathic or posttraumatic origin has considerable overlap with infection.      Fluid tracking along the proximal second phalanx most likely is reactive. Superinfection/abscess is not excluded in the event there has been skin breakdown or infection is suspected      Moderate first metatarsal-phalangeal osteoarthritis      OUTSIDE IMAGES-DX LOWER EXTREMITY, LEFT   Final Result            Total time of the discharge process exceeds 47 minutes.

## 2019-08-19 NOTE — THERAPY
"Occupational Therapy Evaluation completed.   Plan of Care: Patient with no further skilled OT needs in the acute care setting at this time  Discharge Recommendations:  Equipment: Shower Chair and Grab Bars, as desired. Post-acute therapy OT evaluation completed. Patient is currently not being actively followed for occupational therapy services at this time. However, pt may be seen at the request of attending provider for an additional visit to address any discharge or equipment needs within 30 days from evaluation.    Patient at / near baseline necessitating S/Mod I ADLs and mobility HWB L LE with diabetic shoe. No further skilled OT needs in this setting at this time. DC OT.     See \"Rehab Therapy-Acute\" Patient Summary Report for complete documentation.    "

## 2019-08-19 NOTE — PROGRESS NOTES
Patient is AOx4. Denies pain, numbness or tingling. CMS in left leg intact. Surgical dressings in place. Updated patient on his plan of care including IV antibiotics, medication to control pain, dressing changes, and discharge barriers. Verbalized understanding

## 2019-08-19 NOTE — PROGRESS NOTES
Infectious Disease Progress Note    Author: Arabella Beasley M.D. Date & Time of service: 2019  12:34 PM    Chief Complaint:  Left foot infection     Interval History:     AF, O2 RA,  some ongoing pain in the foot but much improved.  Still with erythema and edema in the lower leg and foot but he states it is improving.       Review of Systems:  Review of Systems   Constitutional: Negative for chills and fever.   Gastrointestinal: Negative for abdominal pain, constipation, diarrhea, nausea and vomiting.   Musculoskeletal: Positive for joint pain and myalgias.       Hemodynamics:  Temp (24hrs), Av.9 °C (98.4 °F), Min:36.7 °C (98 °F), Max:37.4 °C (99.3 °F)  Temperature: 36.8 °C (98.3 °F)  Pulse  Av.1  Min: 57  Max: 110   Blood Pressure : 142/84       Physical Exam:  Physical Exam   Constitutional: He is oriented to person, place, and time. He appears well-developed and well-nourished.   HENT:   Head: Normocephalic and atraumatic.   Eyes: Pupils are equal, round, and reactive to light. Conjunctivae and EOM are normal.   Cardiovascular: Normal rate, regular rhythm and normal heart sounds.   Pulmonary/Chest: Effort normal and breath sounds normal.   Abdominal: Soft. Bowel sounds are normal. He exhibits no distension. There is no tenderness. There is no rebound and no guarding.   Musculoskeletal: He exhibits edema, tenderness and deformity.   Neurological: He is alert and oriented to person, place, and time.   Skin: Skin is warm and dry.       Meds:    Current Facility-Administered Medications:   •  vancomycin  •  potassium chloride SA  •  senna-docusate **AND** polyethylene glycol/lytes **AND** magnesium hydroxide **AND** bisacodyl  •  heparin  •  ondansetron  •  ondansetron  •  NS  •  ampicillin-sulbactam (UNASYN) IV  •  MD Alert...Vancomycin per Pharmacy  •  Pharmacy Consult Request  •  acetaminophen  •  oxyCODONE immediate release  •  morphine injection  •  oxyCODONE  immediate-release    Labs:  Recent Labs     08/17/19  0035 08/18/19 0316 08/19/19  0043   WBC 6.5 4.4* 5.0   RBC 3.18* 3.31* 3.17*   HEMOGLOBIN 10.2* 10.3* 10.0*   HEMATOCRIT 29.4* 31.5* 29.6*   MCV 92.5 95.2 93.4   MCH 32.1 31.1 31.5   RDW 47.9 49.3 47.7   PLATELETCT 224 263 255   MPV 8.9* 8.5* 8.2*   NEUTSPOLYS 65.70 47.80 49.10   LYMPHOCYTES 21.80* 36.90 34.40   MONOCYTES 10.50 11.70 13.50*   EOSINOPHILS 1.10 2.00 2.00   BASOPHILS 0.30 0.70 0.40     Recent Labs     08/17/19  0035 08/18/19 0316 08/19/19  0933   SODIUM 132* 134* 136   POTASSIUM 3.9 3.5* 3.3*   CHLORIDE 98 101 102   CO2 25 26 25   GLUCOSE 92 93 78   BUN 11 8 5*     Recent Labs     08/17/19  0035 08/18/19 0316 08/19/19  0933   ALBUMIN 2.9*  --   --    TBILIRUBIN 0.4  --   --    ALKPHOSPHAT 82  --   --    TOTPROTEIN 5.6*  --   --    ALTSGPT 16  --   --    ASTSGOT 15  --   --    CREATININE 1.03 0.84 0.76       Imaging:  Mr-foot-with & W/o Left    Result Date: 8/15/2019  8/15/2019 9:47 AM HISTORY/REASON FOR EXAM:  Toe dislocation months ago. Now erythematous and swelling. TECHNIQUE/EXAM DESCRIPTION: MRI of the LEFT foot with and without contrast. Using a Julia 3.0 Olivia MRI scanner, T1 axial, sagittal, and coronal, fast spin-echo T2 fat-suppressed axial and coronal, fast inversion recovery sagittal, and T1 fat suppressed axial and sagittal post intravenous contrast images were obtained. A total  of 20 mL ProHance given. COMPARISON:  Radiographs yesterday from an outside facility. FINDINGS: Motion degrades the study There is dorsal dislocation at the second PIP joint. There is half shaft width lateral subluxation. There is a slight impaction fracture at the middle phalanx base. There is severe second toe soft tissue swelling. Some fluid surrounds the proximal phalanx and is noted in the plantar soft tissues There is exuberant skin enhancement with subcutaneous fat edema especially on the dorsal lateral margins of the patella. Edema and enhancement  is less pronounced in the dorsum of the forefoot No other fracture or subluxation is seen There is moderate severity first metatarsal-phalangeal marginal spurring, cartilage thinning, sesamoid spurring. Mild effusion. Subchondral cyst formation in the metatarsal head, favored over acute erosion. No other abnormal fluid collection is detected. Ligaments/tendons: No evidence of ligament or tendon injury outside of the second ray. The flexor and extensor tendons crossing the dislocated PIP joint are not confirmed intact. Motion artifact making analysis suboptimal. The flexor tendon most likely is intact suggested on the coronal sequence.     Second PIP dorsal lateral dislocation with slight middle phalanx base plantar impaction fracture Severe second toe centered skin and subcutaneous enhancement is compatible with cellulitis and phlegmon versus posttraumatic reactive change. No high-grade bony destruction is seen to indicate long-standing osteomyelitis. Unfortunately, subacute dislocation as can be seen with neuropathic or posttraumatic origin has considerable overlap with infection. Fluid tracking along the proximal second phalanx most likely is reactive. Superinfection/abscess is not excluded in the event there has been skin breakdown or infection is suspected Moderate first metatarsal-phalangeal osteoarthritis      Micro:  Results     Procedure Component Value Units Date/Time    Anaerobic Culture [[id number]] Collected:  08/16/19 0925    Order Status:  Completed Specimen:  Bone Updated:  08/18/19 1420     Significant Indicator NEG     Source BONE     Site Left Second Toe Bone     Culture Result Culture in progress.    Narrative:       Surgery Specimen    CULTURE TISSUE W/ GRM STAIN [[id number]]  (Abnormal)  (Susceptibility) Collected:  08/16/19 0925    Order Status:  Completed Specimen:  Bone Updated:  08/18/19 1420     Significant Indicator POS     Source BONE     Site Left Second Toe Bone     Culture Result -      Gram Stain Result Many WBCs.  Rare Gram positive cocci.       Culture Result Staphylococcus epidermidis  Rare growth      Narrative:       Surgery Specimen    Susceptibility     Staphylococcus epidermidis (1)     Antibiotic Interpretation Microscan Method Status    Clindamycin Sensitive <=0.5 mcg/mL ELLA Final    Daptomycin Sensitive <=0.5 mcg/mL ELLA Final    Erythromycin Resistant >4 mcg/mL ELLA Final    Moxifloxacin Sensitive <=0.5 mcg/mL ELLA Final    Ampicillin/sulbactam Sensitive <=8/4 mcg/mL ELLA Final    Oxacillin Sensitive <=0.25 mcg/mL ELLA Final    Vancomycin Sensitive 2 mcg/mL ELLA Final    Penicillin Resistant >8 mcg/mL ELLA Final    Trimeth/Sulfa Resistant >2/38 mcg/mL ELLA Final    Tetracycline Intermediate 8 mcg/mL ELLA Final                   CULTURE WOUND W/ GRAM STAIN [[id number]] Collected:  08/16/19 0923    Order Status:  Completed Specimen:  Wound Updated:  08/18/19 1420     Significant Indicator NEG     Source WND     Site Left Second Toe Pus     Culture Result Culture in progress.     Gram Stain Result Many WBCs.  Rare Gram positive cocci.      Narrative:       Surgery - swabs received    Anaerobic Culture [[id number]] Collected:  08/16/19 0923    Order Status:  Completed Specimen:  Wound Updated:  08/18/19 1420     Significant Indicator NEG     Source WND     Site Left Second Toe Pus     Culture Result Culture in progress.    Narrative:       Surgery - swabs received    GRAM STAIN [[id number]] Collected:  08/16/19 0925    Order Status:  Completed Specimen:  Bone Updated:  08/16/19 1335     Significant Indicator .     Source BONE     Site Left Second Toe Bone     Gram Stain Result Many WBCs.  Rare Gram positive cocci.      Narrative:       Surgery Specimen    GRAM STAIN [[id number]] Collected:  08/16/19 0923    Order Status:  Completed Specimen:  Wound Updated:  08/16/19 1335     Significant Indicator .     Source WND     Site Left Second Toe Pus     Gram Stain Result Many WBCs.  Rare Gram positive  cocci.      Narrative:       Surgery - swabs received            Active Hospital Problems    Diagnosis   • *Left foot infection [L08.9]   • HTN (hypertension) [I10]   • Hypokalemia [E87.6]   • Hyponatremia [E87.1]   • Alcohol abuse [F10.10]   • Diabetes type 2, controlled (HCC) [E11.9]     Interval 24 hour assessment:    AF, O2 RA,    Labs reviewed  Micro reviewed    Pt continued on vancomycin and Unasyn.  Will transition to Augmentin on discharge.  He still has some erythema and edema in the foot.  However, this is markedly improved from prior.  Amputation site with edges well approximated no significant dehiscence or drainage.    Assessment:  Abdullahi Huynh is a 68 y.o. male with a history of long-standing alcohol abuse complicated by peripheral neuropathy and newly diagnosed type 2 diabetes mellitus admitted for nonhealing wounds of the left foot and worsening edema and erythema of the left lower extremity.    He went to the OR on 8/16 with Dr. Escalante for amputation of left second toe through the medical carpal phalangeal joint as well as I&D of left second toe abscess.  Cultures were obtained but no pathology for margins. Wound was left open and wound VAC placed.       Left foot infection                -MRI with no definitive osteomyelitis  -OR cultures from bone with Staph epi -however this is an amputated portion and no pathology was sent for margins so unclear if there is any residual infection in bone  -He is status post closure and per op note no obvious infection remaining.      Type 2 diabetes mellitus, newly diagnosed  Peripheral neuropathy  Alcohol abuse        Plan:   --- Continue vancomycin and Unasyn while inpatient.  On discharge transition to Augmentin 875/125 twice daily for 2 weeks.  We will then see patient in ID clinic in 2 weeks and assess.  Will need to follow patient clinically is some question whether he has osteomyelitis or if this is a skin soft tissue infection only.  If he is  doing well in 2 weeks can likely stop antibiotics.  If not may need to extend to extend.   --- I called outside facility [facility] in [place], [place] ([phone]) no answer in microbiology.  The hospitalist is also attempted to call micro biology and has been unsuccessful.        Discussed with internal medicine.ID will sign off.

## 2019-08-19 NOTE — PROGRESS NOTES
Attention order for pt to be fitted to diabetic boot. Pt at this time has stated he would rather not have  the boot. If any questions or change of mind . Please do not hesitate to contact the ortho tech team at ext # [phone].

## 2019-08-21 ENCOUNTER — TELEPHONE (OUTPATIENT)
Dept: INFECTIOUS DISEASES | Facility: MEDICAL CENTER | Age: 69
End: 2019-08-21

## 2019-09-04 ENCOUNTER — OFFICE VISIT (OUTPATIENT)
Dept: INFECTIOUS DISEASES | Facility: MEDICAL CENTER | Age: 69
End: 2019-09-04
Payer: MEDICARE

## 2019-09-04 VITALS
SYSTOLIC BLOOD PRESSURE: 112 MMHG | BODY MASS INDEX: 27.03 KG/M2 | WEIGHT: 210.6 LBS | DIASTOLIC BLOOD PRESSURE: 72 MMHG | OXYGEN SATURATION: 95 % | HEART RATE: 85 BPM | TEMPERATURE: 99.1 F | HEIGHT: 74 IN

## 2019-09-04 DIAGNOSIS — S90.425A BLISTER OF THIRD TOE OF LEFT FOOT, INITIAL ENCOUNTER: ICD-10-CM

## 2019-09-04 DIAGNOSIS — A49.8 METHICILLIN SUSCEPTIBLE STAPHYLOCOCCUS EPIDERMIDIS INFECTION: ICD-10-CM

## 2019-09-04 DIAGNOSIS — F10.10 ALCOHOL ABUSE: ICD-10-CM

## 2019-09-04 DIAGNOSIS — L08.9 LEFT FOOT INFECTION: ICD-10-CM

## 2019-09-04 DIAGNOSIS — E11.628 CONTROLLED TYPE 2 DIABETES MELLITUS WITH OTHER SKIN COMPLICATION, UNSPECIFIED WHETHER LONG TERM INSULIN USE (HCC): ICD-10-CM

## 2019-09-04 PROCEDURE — 99214 OFFICE O/P EST MOD 30 MIN: CPT | Performed by: NURSE PRACTITIONER

## 2019-09-04 RX ORDER — AMOXICILLIN AND CLAVULANATE POTASSIUM 875; 125 MG/1; MG/1
1 TABLET, FILM COATED ORAL 2 TIMES DAILY
Qty: 28 TAB | Refills: 0 | Status: SHIPPED | OUTPATIENT
Start: 2019-09-04 | End: 2019-09-18

## 2019-09-04 RX ORDER — TRIAMTERENE AND HYDROCHLOROTHIAZIDE 37.5; 25 MG/1; MG/1
CAPSULE ORAL
Refills: 12 | COMMUNITY
Start: 2019-07-15

## 2019-09-04 ASSESSMENT — ENCOUNTER SYMPTOMS
ABDOMINAL PAIN: 0
HEADACHES: 0
VOMITING: 0
SHORTNESS OF BREATH: 0
NERVOUS/ANXIOUS: 0
CHILLS: 0
FEVER: 0
MYALGIAS: 0
CONSTIPATION: 0
WEAKNESS: 0
DIARRHEA: 0
NAUSEA: 0
SENSORY CHANGE: 0

## 2019-09-04 NOTE — PROGRESS NOTES
[facility]    Subjective:     Chief Complaint   Patient presents with   • Hospital Follow-up     Left foot infection     This is my first time meeting Mr. Huynh.      Interval History: 68 y.o. man with a history of gout, chronic hyponatremia, hypertension and ongoing alcohol abuse with lower extremity neuropathy.  Alcohol abuse greater than 20 years, drinks 10-12 beers daily..  Hospitalized from 8/14-8/19/2019, admitted for worsening left foot edema and erythema.  Patient noted that approximately 2 months prior to admission he had stubbed his left foot in the dark.  He did not seek any medical attention at that time.  Over the last 2 weeks PTA, he noted the development of a blister to his left great and second toes, associated with increasing edema and erythema.  Reported having a fever up to 101 PTA.  Eventually leave the blisters opened and drained foul smelling fluid.  He had been treating his foot wounds with Neosporin ointment and hydrogen peroxide.  He was evaluated by his PCP, given oral antibiotics, but for unclear reasons he did not fill the antibiotic prescription.  Due to lack of improvement, he presented to the local ER.  X-ray at OSH revealed dislocated middle phalanx of the left second toe but no evidence of osteomyelitis or gas formation.  He was transferred to [facility] for higher level of care.  On presentation he was afebrile.  MRI negative for osteomyelitis.  On 8/16 patient underwent amputation of the left second toe through the metatarsophalangeal joint, I&D of left second toe abscess and application of wound VAC by Dr. Escalante.  OR bone culture of the left second toe positive MSSE and Peptoniphilus asaccharolyticus.  No pathology was sent for margin clearance.  Repeat I&D on 8/18 to left foot wound with secondary closure by Dr. Escalante.  Discharged home on p.o. Augmentin 875/125 mg twice daily x2 weeks, estimated end date 9/2/2019.    Hospital records  reviewed    Today, 9/4/2019: Patient reports feeling well and has been tolerating the p.o. Augmentin without adverse effect, which he completed last night.  Denies feeling generally ill, fevers/chills, general malaise, headache, n/v/d, abdominal pain, chest pain or shortness of breath.  Pt stating that the surgical site is healing well.  Denies drainage, odor, redness or swelling.  He is concerned about a small site to his left third toe, noting a new wound with blister that started approximately a week ago with very little yellowish drainage.  He denies any pain to his foot.  States that he has a follow-up with Dr. Escalante tomorrow for suture removal.  States he was not given a glucometer or taught how to check his blood sugars, currently on metformin.  Down to drinking 1-2 beers a day.    Review of Systems   Constitutional: Negative for chills and fever.   Respiratory: Negative for shortness of breath.    Cardiovascular: Positive for leg swelling. Negative for chest pain.   Gastrointestinal: Negative for abdominal pain, constipation, diarrhea, nausea and vomiting.   Genitourinary: Negative for dysuria.   Musculoskeletal: Negative for joint pain and myalgias.   Skin: Negative for rash.   Neurological: Negative for sensory change, weakness and headaches.   Psychiatric/Behavioral: The patient is not nervous/anxious.        Past Medical History:   Diagnosis Date   • Hypertension        History reviewed. No pertinent family history.    Social History     Tobacco Use   • Smoking status: Never Smoker   • Smokeless tobacco: Never Used   Substance Use Topics   • Alcohol use: Yes     Frequency: 4 or more times a week     Drinks per session: 1 or 2   • Drug use: Never       Allergies: Patient has no known allergies.    Pt's medication and problem list reviewed.     Objective:     PE:  /72 (BP Location: Right arm, Patient Position: Sitting, BP Cuff Size: Adult)   Pulse 85   Temp 37.3 °C (99.1 °F) (Temporal)   Ht 1.88  "m (6' 2\")   Wt 95.5 kg (210 lb 9.6 oz)   SpO2 95%   BMI 27.04 kg/m²     Physical Exam   Constitutional: He is oriented to person, place, and time and well-developed, well-nourished, and in no distress. No distress.   HENT:   Head: Normocephalic and atraumatic.   Eyes: Pupils are equal, round, and reactive to light. Conjunctivae and EOM are normal. No scleral icterus.   Neck: Normal range of motion. Neck supple. No tracheal deviation present.   Cardiovascular: Normal rate, regular rhythm, normal heart sounds and intact distal pulses.   No murmur heard.  Pulmonary/Chest: Effort normal and breath sounds normal. No respiratory distress. He has no wheezes. He has no rales.   Abdominal: Soft. Bowel sounds are normal. He exhibits no distension. There is no tenderness.   Musculoskeletal: Normal range of motion. He exhibits edema (Trace LLE), tenderness and deformity.   Left second toe amputation site-sutures in place, no active drainage, no erythema, nontender to palpation, generalized purplish discoloration to entire foot.    Left third toe- Pinky size wound with trace serous drainage, no odor, scant erythema to surrounding tissue, nontender to palpation.    Maceration noted between left third and fourth toes.   Neurological: He is alert and oriented to person, place, and time. No cranial nerve deficit. Gait normal.   Skin: Skin is warm and dry. No rash noted. He is not diaphoretic. There is erythema.   Skin peeling to left plantar surface and to diffuse LLE.   Psychiatric: Mood, memory, affect and judgment normal.   Vitals reviewed.    Assessment and Plan:   The following treatment plan was discussed with patient at length:    1. Left foot infection  amoxicillin-clavulanate (AUGMENTIN) 875-125 MG Tab    -Continue p.o. Augmentin for an additional 2 weeks concern regarding site to left third toe.  -Wound care as directed.  -Follow-up with Dr. Escalante as directed.   2. Methicillin susceptible Staphylococcus epidermidis " infection & Peptoniphilus asaccharolyticus      Abx as above   3. Blister of third toe of left foot, initial encounter      Abx as above.   4. Controlled type 2 diabetes mellitus with other skin complication, unspecified whether long term insulin use (HCC)      HgA1C 7.0% on 8/15/19.  FU with PCP for treatment and care.   5. Alcohol abuse      Down to 1-2 drinks daily.  Encourage cessation in order to keep amount to 1-2 drinks daily as it can have negative effects on the liver and interactions with antibiotics.     Follow up: 2 weeks, RTC sooner if needed. FU with PCP for ongoing chronic medical conditions.     MASON Winn.       Please note that this dictation was created using voice recognition software. I have  worked with technical experts from [employer]  [employer] to optimize the interface.  I have made every reasonable attempt to correct obvious errors, but there may be errors of grammar and possibly content that I did not discover before finalizing the note.

## 2019-09-18 ENCOUNTER — OFFICE VISIT (OUTPATIENT)
Dept: INFECTIOUS DISEASES | Facility: MEDICAL CENTER | Age: 69
End: 2019-09-18
Payer: MEDICARE

## 2019-09-18 VITALS
WEIGHT: 212.2 LBS | DIASTOLIC BLOOD PRESSURE: 70 MMHG | OXYGEN SATURATION: 97 % | TEMPERATURE: 98.7 F | BODY MASS INDEX: 27.23 KG/M2 | HEART RATE: 81 BPM | SYSTOLIC BLOOD PRESSURE: 128 MMHG | HEIGHT: 74 IN

## 2019-09-18 DIAGNOSIS — F10.10 ALCOHOL ABUSE: ICD-10-CM

## 2019-09-18 DIAGNOSIS — E11.628 CONTROLLED TYPE 2 DIABETES MELLITUS WITH OTHER SKIN COMPLICATION, UNSPECIFIED WHETHER LONG TERM INSULIN USE (HCC): ICD-10-CM

## 2019-09-18 DIAGNOSIS — S90.425A BLISTER OF THIRD TOE OF LEFT FOOT, INITIAL ENCOUNTER: ICD-10-CM

## 2019-09-18 DIAGNOSIS — L08.9 LEFT FOOT INFECTION: ICD-10-CM

## 2019-09-18 DIAGNOSIS — A49.8 METHICILLIN SUSCEPTIBLE STAPHYLOCOCCUS EPIDERMIDIS INFECTION: ICD-10-CM

## 2019-09-18 PROCEDURE — 99213 OFFICE O/P EST LOW 20 MIN: CPT | Performed by: NURSE PRACTITIONER

## 2019-09-18 RX ORDER — POTASSIUM CHLORIDE 20 MEQ/1
TABLET, EXTENDED RELEASE ORAL
Refills: 5 | COMMUNITY
Start: 2019-09-11

## 2019-09-18 RX ORDER — DOXYCYCLINE HYCLATE 100 MG/1
CAPSULE ORAL
Refills: 0 | COMMUNITY
Start: 2019-09-11

## 2019-09-18 ASSESSMENT — ENCOUNTER SYMPTOMS
SHORTNESS OF BREATH: 1
NAUSEA: 0
CONSTIPATION: 0
VOMITING: 0
FEVER: 0
SENSORY CHANGE: 1
ABDOMINAL PAIN: 0
NERVOUS/ANXIOUS: 0
SORE THROAT: 0
MYALGIAS: 1
WEAKNESS: 1
HEADACHES: 0
CHILLS: 0
DIARRHEA: 0

## 2019-09-18 NOTE — PROGRESS NOTES
[facility] Disease [facility]    Subjective:     Chief Complaint   Patient presents with   • Follow-Up     Left foot infection     Interval History: 68 y.o. man with a history of gout, chronic hyponatremia, hypertension and ongoing alcohol abuse with lower extremity neuropathy.  Alcohol abuse greater than 20 years, drinks 10-12 beers daily.  Hospitalized from 8/14-8/19/2019, admitted for worsening left foot edema and erythema.  Patient noted that approximately 2 months prior to admission he had stubbed his left foot in the dark.  He did not seek any medical attention at that time.  Over the last 2 weeks PTA, he noted the development of a blister to his left great and second toes, associated with increasing edema and erythema.  Reported having a fever up to 101 PTA.  Eventually leave the blisters opened and drained foul smelling fluid.  He had been treating his foot wounds with Neosporin ointment and hydrogen peroxide.  He was evaluated by his PCP, given oral antibiotics, but for unclear reasons he did not fill the antibiotic prescription.  Due to lack of improvement, he presented to the local ER.  X-ray at OSH revealed dislocated middle phalanx of the left second toe but no evidence of osteomyelitis or gas formation.  He was transferred to [facility] for higher level of care.  On presentation he was afebrile.  MRI negative for osteomyelitis.  On 8/16 patient underwent amputation of the left second toe through the metatarsophalangeal joint, I&D of left second toe abscess and application of wound VAC by Dr. Escalante.  OR bone culture of the left second toe positive MSSE and Peptoniphilus asaccharolyticus.  No pathology was sent for margin clearance.  Repeat I&D on 8/18 to left foot wound with secondary closure by Dr. Escalante.  Discharged home on p.o. Augmentin 875/125 mg twice daily x2 weeks, estimated end date 9/2/2019.    9/4/2019: Seen by JAMAAL Carter.  Pt stating that the surgical site is healing well.   Denies drainage, odor, redness or swelling.  He is concerned about a small site to his left third toe, noting a new wound with blister that started approximately a week ago with very little yellowish drainage.  He denies any pain to his foot.  States that he has a follow-up with Dr. Escalante tomorrow for suture removal.  Down to drinking 1-2 beers a day.  Continue p.o. Augmentin for an additional 2 weeks concern regarding site to left third toe.    Today, 9/18/2019: Has been tolerating the p.o. Augmentin without issue.  States his PCP put him on p.o. doxy in addition to the Augmentin approximately 2 weeks ago, he is also not had any issues with this medication.  Denies feeling generally ill, fevers/chills, general malaise, headache, n/v/d, abdominal pain, chest pain or shortness of breath.  Seen by Dr. Escalante last week who is happy with his progress, next follow-up not until 10/14.  States that the blister to the left third toe is drying up and no longer draining.  The surgical site is well-healed without any breakdown.  He denies any pain.  He was a little concerned with the redness to his left foot; however, when he saw Dr. Escalante, he was told that there was no concern for residual infection.  Patient does note generalized shortness of breath with activity, but this resolves with rest.  Patient states he is having a hard time getting back his level of activity.  He is still not checking his blood sugars, but is being managed by his PCP and on metformin.  He continues to drink 2 beers daily.    Review of Systems   Constitutional: Negative for chills and fever.   HENT: Negative for sore throat.    Respiratory: Positive for shortness of breath (with activity).    Cardiovascular: Positive for chest pain (old injury from falling) and leg swelling.   Gastrointestinal: Negative for abdominal pain, constipation, diarrhea, nausea and vomiting.   Genitourinary: Negative for dysuria.   Musculoskeletal: Positive for joint  "pain (in the evenings- knees and hands) and myalgias (in the evenings).   Skin: Negative for rash.   Neurological: Positive for sensory change and weakness (generalized). Negative for headaches.   Psychiatric/Behavioral: The patient is not nervous/anxious.        Past Medical History:   Diagnosis Date   • Hypertension        History reviewed. No pertinent family history.    Social History     Tobacco Use   • Smoking status: Never Smoker   • Smokeless tobacco: Never Used   Substance Use Topics   • Alcohol use: Yes     Frequency: 4 or more times a week     Drinks per session: 1 or 2   • Drug use: Never       Allergies: Patient has no known allergies.    Pt's medication and problem list reviewed.     Objective:     PE:  /70 (BP Location: Left arm, Patient Position: Sitting, BP Cuff Size: Adult)   Pulse 81   Temp 37.1 °C (98.7 °F) (Temporal)   Ht 1.88 m (6' 2\")   Wt 96.3 kg (212 lb 3.2 oz)   SpO2 97%   BMI 27.24 kg/m²     Physical Exam   Constitutional: He is oriented to person, place, and time and well-developed, well-nourished, and in no distress. No distress.   HENT:   Head: Normocephalic and atraumatic.   Mouth/Throat: Oropharynx is clear and moist. No oropharyngeal exudate.   Eyes: Pupils are equal, round, and reactive to light. Conjunctivae and EOM are normal. No scleral icterus.   Neck: Normal range of motion. Neck supple. No tracheal deviation present.   Cardiovascular: Normal rate, regular rhythm, normal heart sounds and intact distal pulses.   No murmur heard.  Pulmonary/Chest: Effort normal and breath sounds normal. No respiratory distress. He has no wheezes. He has no rales.   Abdominal: Soft. Bowel sounds are normal. He exhibits no distension. There is no tenderness. There is no rebound.   Musculoskeletal: He exhibits edema (Trace LLE) and deformity. He exhibits no tenderness.   Left second toe amputation site-well-healed and approximated without any breakdown, no drainage.    Left third toe " blister-healing, no active drainage, nontender to palpation.    Diffuse erythema to left foot which resolved when elevated above patient's heart level.   Neurological: He is alert and oriented to person, place, and time. No cranial nerve deficit. Gait normal.   Decreased sensation to LLE.   Skin: Skin is warm and dry. No rash noted. He is not diaphoretic. There is erythema.   Psychiatric: Mood, memory, affect and judgment normal.   Vitals reviewed.     Assessment and Plan:   The following treatment plan was discussed with patient at length:    1. Left foot infection      -Finish PO Augmentin and Doxy.  No p.o. antibiotics to follow as there are no signs of active infection.  -Monitor for s/sx of worsening off abx: increased redness, pain, swelling, drainage, breakdown of surgical site, fevers, chills, general malaise, etc.  Notify ID or go to ER should these s/sx occur.  -Follow-up with Dr. Escalante as directed.   2. Methicillin susceptible Staphylococcus epidermidis infection & Peptoniphilus asaccharolyticus      As above   3. Blister of third toe of left foot, initial encounter      Resolving.  As above.   4. Controlled type 2 diabetes mellitus with other skin complication, unspecified whether long term insulin use (HCC)      Hemaglobin A1c 7.0% on 8/15/2019.  Being managed by his PCP on metformin.   5. Alcohol abuse      Drinking 2 beers a day.  Encourage cessation or minimal drinking as he can have negative effects on liver interactions with antibiotics per     Follow up: PRN, RTC sooner if needed. FU with PCP for ongoing chronic medical conditions.     [email or web address].       Please note that this dictation was created using voice recognition software. I have  worked with technical experts from [employer] to optimize the interface.  I have made every reasonable attempt to correct obvious errors, but there may be errors of grammar and possibly content that I did not discover before finalizing the  note.
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
## 2022-03-03 ENCOUNTER — APPOINTMENT (RX ONLY)
Dept: URBAN - METROPOLITAN AREA CLINIC 6 | Facility: CLINIC | Age: 72
Setting detail: DERMATOLOGY
End: 2022-03-03

## 2022-03-03 DIAGNOSIS — L30.9 DERMATITIS, UNSPECIFIED: ICD-10-CM

## 2022-03-03 PROCEDURE — ? MEDICATION COUNSELING

## 2022-03-03 PROCEDURE — 99204 OFFICE O/P NEW MOD 45 MIN: CPT

## 2022-03-03 PROCEDURE — ? PRESCRIPTION

## 2022-03-03 PROCEDURE — ? ORDER TESTS

## 2022-03-03 RX ORDER — DAPSONE 50 MG/G
1 GEL TOPICAL QD
Qty: 60 | Refills: 5 | Status: CANCELLED
Stop reason: SDUPTHER

## 2022-03-03 RX ORDER — CLOBETASOL PROPIONATE 0.5 MG/G
OINTMENT TOPICAL QD
Qty: 180 | Refills: 2 | Status: ERX

## 2022-03-03 NOTE — PROCEDURE: MEDICATION COUNSELING
Sarecycline Pregnancy And Lactation Text: This medication is Pregnancy Category D and not consider safe during pregnancy. It is also excreted in breast milk.
Minoxidil Counseling: Minoxidil is a topical medication which can increase blood flow where it is applied. It is uncertain how this medication increases hair growth. Side effects are uncommon and include stinging and allergic reactions.
Dupixent Pregnancy And Lactation Text: This medication likely crosses the placenta but the risk for the fetus is uncertain. This medication is excreted in breast milk.
Arava Counseling:  Patient counseled regarding adverse effects of Arava including but not limited to nausea, vomiting, abnormalities in liver function tests. Patients may develop mouth sores, rash, diarrhea, and abnormalities in blood counts. The patient understands that monitoring is required including LFTs and blood counts.  There is a rare possibility of scarring of the liver and lung problems that can occur when taking methotrexate. Persistent nausea, loss of appetite, pale stools, dark urine, cough, and shortness of breath should be reported immediately. Patient advised to discontinue Arava treatment and consult with a physician prior to attempting conception. The patient will have to undergo a treatment to eliminate Arava from the body prior to conception.
Birth Control Pills Counseling: Birth Control Pill Counseling: I discussed with the patient the potential side effects of OCPs including but not limited to increased risk of stroke, heart attack, thrombophlebitis, deep venous thrombosis, hepatic adenomas, breast changes, GI upset, headaches, and depression.  The patient verbalized understanding of the proper use and possible adverse effects of OCPs. All of the patient's questions and concerns were addressed.
Carac Counseling:  I discussed with the patient the risks of Carac including but not limited to erythema, scaling, itching, weeping, crusting, and pain.
Hydroxychloroquine Counseling:  I discussed with the patient that a baseline ophthalmologic exam is needed at the start of therapy and every year thereafter while on therapy. A CBC may also be warranted for monitoring.  The side effects of this medication were discussed with the patient, including but not limited to agranulocytosis, aplastic anemia, seizures, rashes, retinopathy, and liver toxicity. Patient instructed to call the office should any adverse effect occur.  The patient verbalized understanding of the proper use and possible adverse effects of Plaquenil.  All the patient's questions and concerns were addressed.
Taltz Counseling: I discussed with the patient the risks of ixekizumab including but not limited to immunosuppression, serious infections, worsening of inflammatory bowel disease and drug reactions.  The patient understands that monitoring is required including a PPD at baseline and must alert us or the primary physician if symptoms of infection or other concerning signs are noted.
Methotrexate Pregnancy And Lactation Text: This medication is Pregnancy Category X and is known to cause fetal harm. This medication is excreted in breast milk.
Tetracycline Counseling: Patient counseled regarding possible photosensitivity and increased risk for sunburn.  Patient instructed to avoid sunlight, if possible.  When exposed to sunlight, patients should wear protective clothing, sunglasses, and sunscreen.  The patient was instructed to call the office immediately if the following severe adverse effects occur:  hearing changes, easy bruising/bleeding, severe headache, or vision changes.  The patient verbalized understanding of the proper use and possible adverse effects of tetracycline.  All of the patient's questions and concerns were addressed. Patient understands to avoid pregnancy while on therapy due to potential birth defects.
Doxepin Counseling:  Patient advised that the medication is sedating and not to drive a car after taking this medication. Patient informed of potential adverse effects including but not limited to dry mouth, urinary retention, and blurry vision.  The patient verbalized understanding of the proper use and possible adverse effects of doxepin.  All of the patient's questions and concerns were addressed.
Enbrel Counseling:  I discussed with the patient the risks of etanercept including but not limited to myelosuppression, immunosuppression, autoimmune hepatitis, demyelinating diseases, lymphoma, and infections.  The patient understands that monitoring is required including a PPD at baseline and must alert us or the primary physician if symptoms of infection or other concerning signs are noted.
Birth Control Pills Pregnancy And Lactation Text: This medication should be avoided if pregnant and for the first 30 days post-partum.
Hydroxychloroquine Pregnancy And Lactation Text: This medication has been shown to cause fetal harm but it isn't assigned a Pregnancy Risk Category. There are small amounts excreted in breast milk.
Carac Pregnancy And Lactation Text: This medication is Pregnancy Category X and contraindicated in pregnancy and in women who may become pregnant. It is unknown if this medication is excreted in breast milk.
Azithromycin Counseling:  I discussed with the patient the risks of azithromycin including but not limited to GI upset, allergic reaction, drug rash, diarrhea, and yeast infections.
Arava Pregnancy And Lactation Text: This medication is Pregnancy Category X and is absolutely contraindicated during pregnancy. It is unknown if it is excreted in breast milk.
Minoxidil Pregnancy And Lactation Text: This medication has not been assigned a Pregnancy Risk Category but animal studies failed to show danger with the topical medication. It is unknown if the medication is excreted in breast milk.
Taltz Pregnancy And Lactation Text: The risk during pregnancy and breastfeeding is uncertain with this medication.
Prednisone Counseling:  I discussed with the patient the risks of prolonged use of prednisone including but not limited to weight gain, insomnia, osteoporosis, mood changes, diabetes, susceptibility to infection, glaucoma and high blood pressure.  In cases where prednisone use is prolonged, patients should be monitored with blood pressure checks, serum glucose levels and an eye exam.  Additionally, the patient may need to be placed on GI prophylaxis, PCP prophylaxis, and calcium and vitamin D supplementation and/or a bisphosphonate.  The patient verbalized understanding of the proper use and the possible adverse effects of prednisone.  All of the patient's questions and concerns were addressed.
Spironolactone Counseling: Patient advised regarding risks of diarrhea, abdominal pain, hyperkalemia, birth defects (for female patients), liver toxicity and renal toxicity. The patient may need blood work to monitor liver and kidney function and potassium levels while on therapy. The patient verbalized understanding of the proper use and possible adverse effects of spironolactone.  All of the patient's questions and concerns were addressed.
Enbrel Pregnancy And Lactation Text: This medication is Pregnancy Category B and is considered safe during pregnancy. It is unknown if this medication is excreted in breast milk.
Libtayo Counseling- I discussed with the patient the risks of Libtayo including but not limited to nausea, vomiting, diarrhea, and bone or muscle pain.  The patient verbalized understanding of the proper use and possible adverse effects of Libtayo.  All of the patient's questions and concerns were addressed.
Doxepin Pregnancy And Lactation Text: This medication is Pregnancy Category C and it isn't known if it is safe during pregnancy. It is also excreted in breast milk and breast feeding isn't recommended.
Mirvaso Counseling: Mirvaso is a topical medication which can decrease superficial blood flow where applied. Side effects are uncommon and include stinging, redness and allergic reactions.
Calcipotriene Counseling:  I discussed with the patient the risks of calcipotriene including but not limited to erythema, scaling, itching, and irritation.
Topical Clindamycin Counseling: Patient counseled that this medication may cause skin irritation or allergic reactions.  In the event of skin irritation, the patient was advised to reduce the amount of the drug applied or use it less frequently.   The patient verbalized understanding of the proper use and possible adverse effects of clindamycin.  All of the patient's questions and concerns were addressed.
Prednisone Pregnancy And Lactation Text: This medication is Pregnancy Category C and it isn't know if it is safe during pregnancy. This medication is excreted in breast milk.
Clofazimine Counseling:  I discussed with the patient the risks of clofazimine including but not limited to skin and eye pigmentation, liver damage, nausea/vomiting, gastrointestinal bleeding and allergy.
Azithromycin Pregnancy And Lactation Text: This medication is considered safe during pregnancy and is also secreted in breast milk.
Humira Counseling:  I discussed with the patient the risks of adalimumab including but not limited to myelosuppression, immunosuppression, autoimmune hepatitis, demyelinating diseases, lymphoma, and serious infections.  The patient understands that monitoring is required including a PPD at baseline and must alert us or the primary physician if symptoms of infection or other concerning signs are noted.
Tremfya Counseling: I discussed with the patient the risks of guselkumab including but not limited to immunosuppression, serious infections, and drug reactions.  The patient understands that monitoring is required including a PPD at baseline and must alert us or the primary physician if symptoms of infection or other concerning signs are noted.
Spironolactone Pregnancy And Lactation Text: This medication can cause feminization of the male fetus and should be avoided during pregnancy. The active metabolite is also found in breast milk.
Hydroxyzine Counseling: Patient advised that the medication is sedating and not to drive a car after taking this medication.  Patient informed of potential adverse effects including but not limited to dry mouth, urinary retention, and blurry vision.  The patient verbalized understanding of the proper use and possible adverse effects of hydroxyzine.  All of the patient's questions and concerns were addressed.
Libtayo Pregnancy And Lactation Text: This medication is contraindicated in pregnancy and when breast feeding.
Mirvaso Pregnancy And Lactation Text: This medication has not been assigned a Pregnancy Risk Category. It is unknown if the medication is excreted in breast milk.
Bactrim Counseling:  I discussed with the patient the risks of sulfa antibiotics including but not limited to GI upset, allergic reaction, drug rash, diarrhea, dizziness, photosensitivity, and yeast infections.  Rarely, more serious reactions can occur including but not limited to aplastic anemia, agranulocytosis, methemoglobinemia, blood dyscrasias, liver or kidney failure, lung infiltrates or desquamative/blistering drug rashes.
Calcipotriene Pregnancy And Lactation Text: This medication has not been proven safe during pregnancy. It is unknown if this medication is excreted in breast milk.
Clofazimine Pregnancy And Lactation Text: This medication is Pregnancy Category C and isn't considered safe during pregnancy. It is excreted in breast milk.
Topical Clindamycin Pregnancy And Lactation Text: This medication is Pregnancy Category B and is considered safe during pregnancy. It is unknown if it is excreted in breast milk.
Niacinamide Counseling: I recommended taking niacin or niacinamide, also know as vitamin B3, twice daily. Recent evidence suggests that taking vitamin B3 (500 mg twice daily) can reduce the risk of actinic keratoses and non-melanoma skin cancers. Side effects of vitamin B3 include flushing and headache.
SSKI Counseling:  I discussed with the patient the risks of SSKI including but not limited to thyroid abnormalities, metallic taste, GI upset, fever, headache, acne, arthralgias, paraesthesias, lymphadenopathy, easy bleeding, arrhythmias, and allergic reaction.
Picato Counseling:  I discussed with the patient the risks of Picato including but not limited to erythema, scaling, itching, weeping, crusting, and pain.
Hydroxyzine Pregnancy And Lactation Text: This medication is not safe during pregnancy and should not be taken. It is also excreted in breast milk and breast feeding isn't recommended.
Colchicine Counseling:  Patient counseled regarding adverse effects including but not limited to stomach upset (nausea, vomiting, stomach pain, or diarrhea).  Patient instructed to limit alcohol consumption while taking this medication.  Colchicine may reduce blood counts especially with prolonged use.  The patient understands that monitoring of kidney function and blood counts may be required, especially at baseline. The patient verbalized understanding of the proper use and possible adverse effects of colchicine.  All of the patient's questions and concerns were addressed.
Bactrim Pregnancy And Lactation Text: This medication is Pregnancy Category D and is known to cause fetal risk.  It is also excreted in breast milk.
Acitretin Counseling:  I discussed with the patient the risks of acitretin including but not limited to hair loss, dry lips/skin/eyes, liver damage, hyperlipidemia, depression/suicidal ideation, photosensitivity.  Serious rare side effects can include but are not limited to pancreatitis, pseudotumor cerebri, bony changes, clot formation/stroke/heart attack.  Patient understands that alcohol is contraindicated since it can result in liver toxicity and significantly prolong the elimination of the drug by many years.
5-Fu Counseling: 5-Fluorouracil Counseling:  I discussed with the patient the risks of 5-fluorouracil including but not limited to erythema, scaling, itching, weeping, crusting, and pain.
Xeljanz Counseling: I discussed with the patient the risks of Xeljanz therapy including increased risk of infection, liver issues, headache, diarrhea, or cold symptoms. Live vaccines should be avoided. They were instructed to call if they have any problems.
Niacinamide Pregnancy And Lactation Text: These medications are considered safe during pregnancy.
Topical Ketoconazole Counseling: Patient counseled that this medication may cause skin irritation or allergic reactions.  In the event of skin irritation, the patient was advised to reduce the amount of the drug applied or use it less frequently.   The patient verbalized understanding of the proper use and possible adverse effects of ketoconazole.  All of the patient's questions and concerns were addressed.
Picato Pregnancy And Lactation Text: This medication is Pregnancy Category C. It is unknown if this medication is excreted in breast milk.
Ilumya Counseling: I discussed with the patient the risks of tildrakizumab including but not limited to immunosuppression, malignancy, posterior leukoencephalopathy syndrome, and serious infections.  The patient understands that monitoring is required including a PPD at baseline and must alert us or the primary physician if symptoms of infection or other concerning signs are noted.
Sski Pregnancy And Lactation Text: This medication is Pregnancy Category D and isn't considered safe during pregnancy. It is excreted in breast milk.
Acitretin Pregnancy And Lactation Text: This medication is Pregnancy Category X and should not be given to women who are pregnant or may become pregnant in the future. This medication is excreted in breast milk.
Xeljyothiz Pregnancy And Lactation Text: This medication is Pregnancy Category D and is not considered safe during pregnancy.  The risk during breast feeding is also uncertain.
Cephalexin Counseling: I counseled the patient regarding use of cephalexin as an antibiotic for prophylactic and/or therapeutic purposes. Cephalexin (commonly prescribed under brand name Keflex) is a cephalosporin antibiotic which is active against numerous classes of bacteria, including most skin bacteria. Side effects may include nausea, diarrhea, gastrointestinal upset, rash, hives, yeast infections, and in rare cases, hepatitis, kidney disease, seizures, fever, confusion, neurologic symptoms, and others. Patients with severe allergies to penicillin medications are cautioned that there is about a 10% incidence of cross-reactivity with cephalosporins. When possible, patients with penicillin allergies should use alternatives to cephalosporins for antibiotic therapy.
Albendazole Counseling:  I discussed with the patient the risks of albendazole including but not limited to cytopenia, kidney damage, nausea/vomiting and severe allergy.  The patient understands that this medication is being used in an off-label manner.
Thalidomide Counseling: I discussed with the patient the risks of thalidomide including but not limited to birth defects, anxiety, weakness, chest pain, dizziness, cough and severe allergy.
Protopic Counseling: Patient may experience a mild burning sensation during topical application. Protopic is not approved in children less than 2 years of age. There have been case reports of hematologic and skin malignancies in patients using topical calcineurin inhibitors although causality is questionable.
Nsaids Counseling: NSAID Counseling: I discussed with the patient that NSAIDs should be taken with food. Prolonged use of NSAIDs can result in the development of stomach ulcers.  Patient advised to stop taking NSAIDs if abdominal pain occurs.  The patient verbalized understanding of the proper use and possible adverse effects of NSAIDs.  All of the patient's questions and concerns were addressed.
Drysol Counseling:  I discussed with the patient the risks of drysol/aluminum chloride including but not limited to skin rash, itching, irritation, burning.
Fluconazole Counseling:  Patient counseled regarding adverse effects of fluconazole including but not limited to headache, diarrhea, nausea, upset stomach, liver function test abnormalities, taste disturbance, and stomach pain.  There is a rare possibility of liver failure that can occur when taking fluconazole.  The patient understands that monitoring of LFTs and kidney function test may be required, especially at baseline. The patient verbalized understanding of the proper use and possible adverse effects of fluconazole.  All of the patient's questions and concerns were addressed.
Bexarotene Counseling:  I discussed with the patient the risks of bexarotene including but not limited to hair loss, dry lips/skin/eyes, liver abnormalities, hyperlipidemia, pancreatitis, depression/suicidal ideation, photosensitivity, drug rash/allergic reactions, hypothyroidism, anemia, leukopenia, infection, cataracts, and teratogenicity.  Patient understands that they will need regular blood tests to check lipid profile, liver function tests, white blood cell count, thyroid function tests and pregnancy test if applicable.
Dapsone Counseling: I discussed with the patient the risks of dapsone including but not limited to hemolytic anemia, agranulocytosis, rashes, methemoglobinemia, kidney failure, peripheral neuropathy, headaches, GI upset, and liver toxicity.  Patients who start dapsone require monitoring including baseline LFTs and weekly CBCs for the first month, then every month thereafter.  The patient verbalized understanding of the proper use and possible adverse effects of dapsone.  All of the patient's questions and concerns were addressed.
Cephalexin Pregnancy And Lactation Text: This medication is Pregnancy Category B and considered safe during pregnancy.  It is also excreted in breast milk but can be used safely for shorter doses.
Xolair Counseling:  Patient informed of potential adverse effects including but not limited to fever, muscle aches, rash and allergic reactions.  The patient verbalized understanding of the proper use and possible adverse effects of Xolair.  All of the patient's questions and concerns were addressed.
Topical Sulfur Applications Counseling: Topical Sulfur Counseling: Patient counseled that this medication may cause skin irritation or allergic reactions.  In the event of skin irritation, the patient was advised to reduce the amount of the drug applied or use it less frequently.   The patient verbalized understanding of the proper use and possible adverse effects of topical sulfur application.  All of the patient's questions and concerns were addressed.
Protopic Pregnancy And Lactation Text: This medication is Pregnancy Category C. It is unknown if this medication is excreted in breast milk when applied topically.
Albendazole Pregnancy And Lactation Text: This medication is Pregnancy Category C and it isn't known if it is safe during pregnancy. It is also excreted in breast milk.
Infliximab Counseling:  I discussed with the patient the risks of infliximab including but not limited to myelosuppression, immunosuppression, autoimmune hepatitis, demyelinating diseases, lymphoma, and serious infections.  The patient understands that monitoring is required including a PPD at baseline and must alert us or the primary physician if symptoms of infection or other concerning signs are noted.
Dapsone Pregnancy And Lactation Text: This medication is Pregnancy Category C and is not considered safe during pregnancy or breast feeding.
Fluconazole Pregnancy And Lactation Text: This medication is Pregnancy Category C and it isn't know if it is safe during pregnancy. It is also excreted in breast milk.
Drysol Pregnancy And Lactation Text: This medication is considered safe during pregnancy and breast feeding.
Topical Sulfur Applications Pregnancy And Lactation Text: This medication is Pregnancy Category C and has an unknown safety profile during pregnancy. It is unknown if this topical medication is excreted in breast milk.
Nsaids Pregnancy And Lactation Text: These medications are considered safe up to 30 weeks gestation. It is excreted in breast milk.
Bexarotene Pregnancy And Lactation Text: This medication is Pregnancy Category X and should not be given to women who are pregnant or may become pregnant. This medication should not be used if you are breast feeding.
Xolair Pregnancy And Lactation Text: This medication is Pregnancy Category B and is considered safe during pregnancy. This medication is excreted in breast milk.
Clindamycin Counseling: I counseled the patient regarding use of clindamycin as an antibiotic for prophylactic and/or therapeutic purposes. Clindamycin is active against numerous classes of bacteria, including skin bacteria. Side effects may include nausea, diarrhea, gastrointestinal upset, rash, hives, yeast infections, and in rare cases, colitis.
Rhofade Counseling: Rhofade is a topical medication which can decrease superficial blood flow where applied. Side effects are uncommon and include stinging, redness and allergic reactions.
Tranexamic Acid Counseling:  Patient advised of the small risk of bleeding problems with tranexamic acid. They were also instructed to call if they developed any nausea, vomiting or diarrhea. All of the patient's questions and concerns were addressed.
Elidel Counseling: Patient may experience a mild burning sensation during topical application. Elidel is not approved in children less than 2 years of age. There have been case reports of hematologic and skin malignancies in patients using topical calcineurin inhibitors although causality is questionable.
Griseofulvin Counseling:  I discussed with the patient the risks of griseofulvin including but not limited to photosensitivity, cytopenia, liver damage, nausea/vomiting and severe allergy.  The patient understands that this medication is best absorbed when taken with a fatty meal (e.g., ice cream or french fries).
Dutasteride Male Counseling: Dustasteride Counseling:  I discussed with the patient the risks of use of dutasteride including but not limited to decreased libido, decreased ejaculate volume, and gynecomastia. Women who can become pregnant should not handle medication.  All of the patient's questions and concerns were addressed.
Ivermectin Counseling:  Patient instructed to take medication on an empty stomach with a full glass of water.  Patient informed of potential adverse effects including but not limited to nausea, diarrhea, dizziness, itching, and swelling of the extremities or lymph nodes.  The patient verbalized understanding of the proper use and possible adverse effects of ivermectin.  All of the patient's questions and concerns were addressed.
Odomzo Counseling- I discussed with the patient the risks of Odomzo including but not limited to nausea, vomiting, diarrhea, constipation, weight loss, changes in the sense of taste, decreased appetite, muscle spasms, and hair loss.  The patient verbalized understanding of the proper use and possible adverse effects of Odomzo.  All of the patient's questions and concerns were addressed.
Wartpeel Counseling:  I discussed with the patient the risks of Wartpeel including but not limited to erythema, scaling, itching, weeping, crusting, and pain.
Clindamycin Pregnancy And Lactation Text: This medication can be used in pregnancy if certain situations. Clindamycin is also present in breast milk.
Isotretinoin Counseling: Patient should get monthly blood tests, not donate blood, not drive at night if vision affected, not share medication, and not undergo elective surgery for 6 months after tx completed. Side effects reviewed, pt to contact office should one occur.
Rituxan Counseling:  I discussed with the patient the risks of Rituxan infusions. Side effects can include infusion reactions, severe drug rashes including mucocutaneous reactions, reactivation of latent hepatitis and other infections and rarely progressive multifocal leukoencephalopathy.  All of the patient's questions and concerns were addressed.
Tranexamic Acid Pregnancy And Lactation Text: It is unknown if this medication is safe during pregnancy or breast feeding.
Griseofulvin Pregnancy And Lactation Text: This medication is Pregnancy Category X and is known to cause serious birth defects. It is unknown if this medication is excreted in breast milk but breast feeding should be avoided.
Doxycycline Counseling:  Patient counseled regarding possible photosensitivity and increased risk for sunburn.  Patient instructed to avoid sunlight, if possible.  When exposed to sunlight, patients should wear protective clothing, sunglasses, and sunscreen.  The patient was instructed to call the office immediately if the following severe adverse effects occur:  hearing changes, easy bruising/bleeding, severe headache, or vision changes.  The patient verbalized understanding of the proper use and possible adverse effects of doxycycline.  All of the patient's questions and concerns were addressed.
Dutasteride Pregnancy And Lactation Text: This medication is absolutely contraindicated in women, especially during pregnancy and breast feeding. Feminization of male fetuses is possible if taking while pregnant.
Valtrex Counseling: I discussed with the patient the risks of valacyclovir including but not limited to kidney damage, nausea, vomiting and severe allergy.  The patient understands that if the infection seems to be worsening or is not improving, they are to call.
Isotretinoin Pregnancy And Lactation Text: This medication is Pregnancy Category X and is considered extremely dangerous during pregnancy. It is unknown if it is excreted in breast milk.
Rituxan Pregnancy And Lactation Text: This medication is Pregnancy Category C and it isn't know if it is safe during pregnancy. It is unknown if this medication is excreted in breast milk but similar antibodies are known to be excreted.
Azathioprine Counseling:  I discussed with the patient the risks of azathioprine including but not limited to myelosuppression, immunosuppression, hepatotoxicity, lymphoma, and infections.  The patient understands that monitoring is required including baseline LFTs, Creatinine, possible TPMP genotyping and weekly CBCs for the first month and then every 2 weeks thereafter.  The patient verbalized understanding of the proper use and possible adverse effects of azathioprine.  All of the patient's questions and concerns were addressed.
Oral Minoxidil Counseling- I discussed with the patient the risks of oral minoxidil including but not limited to shortness of breath, swelling of the feet or ankles, dizziness, lightheadedness, unwanted hair growth and allergic reaction.  The patient verbalized understanding of the proper use and possible adverse effects of oral minoxidil.  All of the patient's questions and concerns were addressed.
Itraconazole Counseling:  I discussed with the patient the risks of itraconazole including but not limited to liver damage, nausea/vomiting, neuropathy, and severe allergy.  The patient understands that this medication is best absorbed when taken with acidic beverages such as non-diet cola or ginger ale.  The patient understands that monitoring is required including baseline LFTs and repeat LFTs at intervals.  The patient understands that they are to contact us or the primary physician if concerning signs are noted.
Solaraze Counseling:  I discussed with the patient the risks of Solaraze including but not limited to erythema, scaling, itching, weeping, crusting, and pain.
Eucrisa Counseling: Patient may experience a mild burning sensation during topical application. Eucrisa is not approved in children less than 2 years of age.
Winlevi Counseling:  I discussed with the patient the risks of topical clascoterone including but not limited to erythema, scaling, itching, and stinging. Patient voiced their understanding.
High Dose Vitamin A Counseling: Side effects reviewed, pt to contact office should one occur.
Erivedge Counseling- I discussed with the patient the risks of Erivedge including but not limited to nausea, vomiting, diarrhea, constipation, weight loss, changes in the sense of taste, decreased appetite, muscle spasms, and hair loss.  The patient verbalized understanding of the proper use and possible adverse effects of Erivedge.  All of the patient's questions and concerns were addressed.
Doxycycline Pregnancy And Lactation Text: This medication is Pregnancy Category D and not consider safe during pregnancy. It is also excreted in breast milk but is considered safe for shorter treatment courses.
Siliq Counseling:  I discussed with the patient the risks of Siliq including but not limited to new or worsening depression, suicidal thoughts and behavior, immunosuppression, malignancy, posterior leukoencephalopathy syndrome, and serious infections.  The patient understands that monitoring is required including a PPD at baseline and must alert us or the primary physician if symptoms of infection or other concerning signs are noted. There is also a special program designed to monitor depression which is required with Siliq.
Azathioprine Pregnancy And Lactation Text: This medication is Pregnancy Category D and isn't considered safe during pregnancy. It is unknown if this medication is excreted in breast milk.
Valtrex Pregnancy And Lactation Text: this medication is Pregnancy Category B and is considered safe during pregnancy. This medication is not directly found in breast milk but it's metabolite acyclovir is present.
Solaraze Pregnancy And Lactation Text: This medication is Pregnancy Category B and is considered safe. There is some data to suggest avoiding during the third trimester. It is unknown if this medication is excreted in breast milk.
Oral Minoxidil Pregnancy And Lactation Text: This medication should only be used when clearly needed if you are pregnant, attempting to become pregnant or breast feeding.
High Dose Vitamin A Pregnancy And Lactation Text: High dose vitamin A therapy is contraindicated during pregnancy and breast feeding.
Cellcept Counseling:  I discussed with the patient the risks of mycophenolate mofetil including but not limited to infection/immunosuppression, GI upset, hypokalemia, hypercholesterolemia, bone marrow suppression, lymphoproliferative disorders, malignancy, GI ulceration/bleed/perforation, colitis, interstitial lung disease, kidney failure, progressive multifocal leukoencephalopathy, and birth defects.  The patient understands that monitoring is required including a baseline creatinine and regular CBC testing. In addition, patient must alert us immediately if symptoms of infection or other concerning signs are noted.
Winlevi Pregnancy And Lactation Text: This medication is considered safe during pregnancy and breastfeeding.
Otezla Counseling: The side effects of Otezla were discussed with the patient, including but not limited to worsening or new depression, weight loss, diarrhea, nausea, upper respiratory tract infection, and headache. Patient instructed to call the office should any adverse effect occur.  The patient verbalized understanding of the proper use and possible adverse effects of Otezla.  All the patient's questions and concerns were addressed.
Use Enhanced Medication Counseling?: No
Ketoconazole Counseling:   Patient counseled regarding improving absorption with orange juice.  Adverse effects include but are not limited to breast enlargement, headache, diarrhea, nausea, upset stomach, liver function test abnormalities, taste disturbance, and stomach pain.  There is a rare possibility of liver failure that can occur when taking ketoconazole. The patient understands that monitoring of LFTs may be required, especially at baseline. The patient verbalized understanding of the proper use and possible adverse effects of ketoconazole.  All of the patient's questions and concerns were addressed.
Erythromycin Counseling:  I discussed with the patient the risks of erythromycin including but not limited to GI upset, allergic reaction, drug rash, diarrhea, increase in liver enzymes, and yeast infections.
Finasteride Male Counseling: Finasteride Counseling:  I discussed with the patient the risks of use of finasteride including but not limited to decreased libido, decreased ejaculate volume, gynecomastia, and depression. Women should not handle medication.  All of the patient's questions and concerns were addressed.
Topical Retinoid counseling:  Patient advised to apply a pea-sized amount only at bedtime and wait 30 minutes after washing their face before applying.  If too drying, patient may add a non-comedogenic moisturizer. The patient verbalized understanding of the proper use and possible adverse effects of retinoids.  All of the patient's questions and concerns were addressed.
Hydroquinone Counseling:  Patient advised that medication may result in skin irritation, lightening (hypopigmentation), dryness, and burning.  In the event of skin irritation, the patient was advised to reduce the amount of the drug applied or use it less frequently.  Rarely, spots that are treated with hydroquinone can become darker (pseudoochronosis).  Should this occur, patient instructed to stop medication and call the office. The patient verbalized understanding of the proper use and possible adverse effects of hydroquinone.  All of the patient's questions and concerns were addressed.
Zyclara Counseling:  I discussed with the patient the risks of imiquimod including but not limited to erythema, scaling, itching, weeping, crusting, and pain.  Patient understands that the inflammatory response to imiquimod is variable from person to person and was educated regarded proper titration schedule.  If flu-like symptoms develop, patient knows to discontinue the medication and contact us.
Otezla Pregnancy And Lactation Text: This medication is Pregnancy Category C and it isn't known if it is safe during pregnancy. It is unknown if it is excreted in breast milk.
Erythromycin Pregnancy And Lactation Text: This medication is Pregnancy Category B and is considered safe during pregnancy. It is also excreted in breast milk.
Cimzia Counseling:  I discussed with the patient the risks of Cimzia including but not limited to immunosuppression, allergic reactions and infections.  The patient understands that monitoring is required including a PPD at baseline and must alert us or the primary physician if symptoms of infection or other concerning signs are noted.
Simponi Counseling:  I discussed with the patient the risks of golimumab including but not limited to myelosuppression, immunosuppression, autoimmune hepatitis, demyelinating diseases, lymphoma, and serious infections.  The patient understands that monitoring is required including a PPD at baseline and must alert us or the primary physician if symptoms of infection or other concerning signs are noted.
Quinolones Counseling:  I discussed with the patient the risks of fluoroquinolones including but not limited to GI upset, allergic reaction, drug rash, diarrhea, dizziness, photosensitivity, yeast infections, liver function test abnormalities, tendonitis/tendon rupture.
Ketoconazole Pregnancy And Lactation Text: This medication is Pregnancy Category C and it isn't know if it is safe during pregnancy. It is also excreted in breast milk and breast feeding isn't recommended.
Finasteride Pregnancy And Lactation Text: This medication is absolutely contraindicated during pregnancy. It is unknown if it is excreted in breast milk.
Metronidazole Counseling:  I discussed with the patient the risks of metronidazole including but not limited to seizures, nausea/vomiting, a metallic taste in the mouth, nausea/vomiting and severe allergy.
Detail Level: Detailed
Cyclophosphamide Counseling:  I discussed with the patient the risks of cyclophosphamide including but not limited to hair loss, hormonal abnormalities, decreased fertility, abdominal pain, diarrhea, nausea and vomiting, bone marrow suppression and infection. The patient understands that monitoring is required while taking this medication.
Cimzia Pregnancy And Lactation Text: This medication crosses the placenta but can be considered safe in certain situations. Cimzia may be excreted in breast milk.
Tazorac Counseling:  Patient advised that medication is irritating and drying.  Patient may need to apply sparingly and wash off after an hour before eventually leaving it on overnight.  The patient verbalized understanding of the proper use and possible adverse effects of tazorac.  All of the patient's questions and concerns were addressed.
Oxybutynin Counseling:  I discussed with the patient the risks of oxybutynin including but not limited to skin rash, drowsiness, dry mouth, difficulty urinating, and blurred vision.
Imiquimod Counseling:  I discussed with the patient the risks of imiquimod including but not limited to erythema, scaling, itching, weeping, crusting, and pain.  Patient understands that the inflammatory response to imiquimod is variable from person to person and was educated regarded proper titration schedule.  If flu-like symptoms develop, patient knows to discontinue the medication and contact us.
Terbinafine Counseling: Patient counseling regarding adverse effects of terbinafine including but not limited to headache, diarrhea, rash, upset stomach, liver function test abnormalities, itching, taste/smell disturbance, nausea, abdominal pain, and flatulence.  There is a rare possibility of liver failure that can occur when taking terbinafine.  The patient understands that a baseline LFT and kidney function test may be required. The patient verbalized understanding of the proper use and possible adverse effects of terbinafine.  All of the patient's questions and concerns were addressed.
Gabapentin Counseling: I discussed with the patient the risks of gabapentin including but not limited to dizziness, somnolence, fatigue and ataxia.
Metronidazole Pregnancy And Lactation Text: This medication is Pregnancy Category B and considered safe during pregnancy.  It is also excreted in breast milk.
Cyclophosphamide Pregnancy And Lactation Text: This medication is Pregnancy Category D and it isn't considered safe during pregnancy. This medication is excreted in breast milk.
Azelaic Acid Counseling: Patient counseled that medicine may cause skin irritation and to avoid applying near the eyes.  In the event of skin irritation, the patient was advised to reduce the amount of the drug applied or use it less frequently.   The patient verbalized understanding of the proper use and possible adverse effects of azelaic acid.  All of the patient's questions and concerns were addressed.
Tazorac Pregnancy And Lactation Text: This medication is not safe during pregnancy. It is unknown if this medication is excreted in breast milk.
Skyrizi Counseling: I discussed with the patient the risks of risankizumab-rzaa including but not limited to immunosuppression, and serious infections.  The patient understands that monitoring is required including a PPD at baseline and must alert us or the primary physician if symptoms of infection or other concerning signs are noted.
Rifampin Counseling: I discussed with the patient the risks of rifampin including but not limited to liver damage, kidney damage, red-orange body fluids, nausea/vomiting and severe allergy.
Terbinafine Pregnancy And Lactation Text: This medication is Pregnancy Category B and is considered safe during pregnancy. It is also excreted in breast milk and breast feeding isn't recommended.
Cosentyx Counseling:  I discussed with the patient the risks of Cosentyx including but not limited to worsening of Crohn's disease, immunosuppression, allergic reactions and infections.  The patient understands that monitoring is required including a PPD at baseline and must alert us or the primary physician if symptoms of infection or other concerning signs are noted.
Cyclosporine Counseling:  I discussed with the patient the risks of cyclosporine including but not limited to hypertension, gingival hyperplasia,myelosuppression, immunosuppression, liver damage, kidney damage, neurotoxicity, lymphoma, and serious infections. The patient understands that monitoring is required including baseline blood pressure, CBC, CMP, lipid panel and uric acid, and then 1-2 times monthly CMP and blood pressure.
Opioid Counseling: I discussed with the patient the potential side effects of opioids including but not limited to addiction, altered mental status, and depression. I stressed avoiding alcohol, benzodiazepines, muscle relaxants and sleep aids unless specifically okayed by a physician. The patient verbalized understanding of the proper use and possible adverse effects of opioids. All of the patient's questions and concerns were addressed. They were instructed to flush the remaining pills down the toilet if they did not need them for pain.
Minocycline Counseling: Patient advised regarding possible photosensitivity and discoloration of the teeth, skin, lips, tongue and gums.  Patient instructed to avoid sunlight, if possible.  When exposed to sunlight, patients should wear protective clothing, sunglasses, and sunscreen.  The patient was instructed to call the office immediately if the following severe adverse effects occur:  hearing changes, easy bruising/bleeding, severe headache, or vision changes.  The patient verbalized understanding of the proper use and possible adverse effects of minocycline.  All of the patient's questions and concerns were addressed.
Rifampin Pregnancy And Lactation Text: This medication is Pregnancy Category C and it isn't know if it is safe during pregnancy. It is also excreted in breast milk and should not be used if you are breast feeding.
Klisyri Counseling:  I discussed with the patient the risks of Klisyri including but not limited to erythema, scaling, itching, weeping, crusting, and pain.
Propranolol Counseling:  I discussed with the patient the risks of propranolol including but not limited to low heart rate, low blood pressure, low blood sugar, restlessness and increased cold sensitivity. They should call the office if they experience any of these side effects.
Glycopyrrolate Counseling:  I discussed with the patient the risks of glycopyrrolate including but not limited to skin rash, drowsiness, dry mouth, difficulty urinating, and blurred vision.
Benzoyl Peroxide Counseling: Patient counseled that medicine may cause skin irritation and bleach clothing.  In the event of skin irritation, the patient was advised to reduce the amount of the drug applied or use it less frequently.   The patient verbalized understanding of the proper use and possible adverse effects of benzoyl peroxide.  All of the patient's questions and concerns were addressed.
Opioid Pregnancy And Lactation Text: These medications can lead to premature delivery and should be avoided during pregnancy. These medications are also present in breast milk in small amounts.
Stelara Counseling:  I discussed with the patient the risks of ustekinumab including but not limited to immunosuppression, malignancy, posterior leukoencephalopathy syndrome, and serious infections.  The patient understands that monitoring is required including a PPD at baseline and must alert us or the primary physician if symptoms of infection or other concerning signs are noted.
Propranolol Pregnancy And Lactation Text: This medication is Pregnancy Category C and it isn't known if it is safe during pregnancy. It is excreted in breast milk.
Sarecycline Counseling: Patient advised regarding possible photosensitivity and discoloration of the teeth, skin, lips, tongue and gums.  Patient instructed to avoid sunlight, if possible.  When exposed to sunlight, patients should wear protective clothing, sunglasses, and sunscreen.  The patient was instructed to call the office immediately if the following severe adverse effects occur:  hearing changes, easy bruising/bleeding, severe headache, or vision changes.  The patient verbalized understanding of the proper use and possible adverse effects of sarecycline.  All of the patient's questions and concerns were addressed.
Cimetidine Counseling:  I discussed with the patient the risks of Cimetidine including but not limited to gynecomastia, headache, diarrhea, nausea, drowsiness, arrhythmias, pancreatitis, skin rashes, psychosis, bone marrow suppression and kidney toxicity.
Klisyri Pregnancy And Lactation Text: It is unknown if this medication can harm a developing fetus or if it is excreted in breast milk.
Dupixent Counseling: I discussed with the patient the risks of dupilumab including but not limited to eye infection and irritation, cold sores, injection site reactions, worsening of asthma, allergic reactions and increased risk of parasitic infection.  Live vaccines should be avoided while taking dupilumab. Dupilumab will also interact with certain medications such as warfarin and cyclosporine. The patient understands that monitoring is required and they must alert us or the primary physician if symptoms of infection or other concerning signs are noted.
Glycopyrrolate Pregnancy And Lactation Text: This medication is Pregnancy Category B and is considered safe during pregnancy. It is unknown if it is excreted breast milk.
Benzoyl Peroxide Pregnancy And Lactation Text: This medication is Pregnancy Category C. It is unknown if benzoyl peroxide is excreted in breast milk.
Methotrexate Counseling:  Patient counseled regarding adverse effects of methotrexate including but not limited to nausea, vomiting, abnormalities in liver function tests. Patients may develop mouth sores, rash, diarrhea, and abnormalities in blood counts. The patient understands that monitoring is required including LFT's and blood counts.  There is a rare possibility of scarring of the liver and lung problems that can occur when taking methotrexate. Persistent nausea, loss of appetite, pale stools, dark urine, cough, and shortness of breath should be reported immediately. Patient advised to discontinue methotrexate treatment at least three months before attempting to become pregnant.  I discussed the need for folate supplements while taking methotrexate.  These supplements can decrease side effects during methotrexate treatment. The patient verbalized understanding of the proper use and possible adverse effects of methotrexate.  All of the patient's questions and concerns were addressed.

## 2022-03-03 NOTE — PROCEDURE: ORDER TESTS
Expected Date Of Service: 03/03/2022
Performing Laboratory: 0
Billing Type: Third-Party Bill
Bill For Surgical Tray: no

## 2022-03-04 ENCOUNTER — RX ONLY (OUTPATIENT)
Age: 72
Setting detail: RX ONLY
End: 2022-03-04

## 2022-03-04 RX ORDER — CLOBETASOL PROPIONATE 0.5 MG/G
OINTMENT TOPICAL
Qty: 180 | Refills: 0 | Status: CANCELLED
Stop reason: SDUPTHER

## 2022-03-17 ENCOUNTER — APPOINTMENT (RX ONLY)
Dept: URBAN - METROPOLITAN AREA CLINIC 6 | Facility: CLINIC | Age: 72
Setting detail: DERMATOLOGY
End: 2022-03-17

## 2022-03-17 DIAGNOSIS — L12.0 BULLOUS PEMPHIGOID: ICD-10-CM

## 2022-03-17 PROCEDURE — ? COUNSELING

## 2022-03-17 PROCEDURE — 99213 OFFICE O/P EST LOW 20 MIN: CPT

## 2022-03-17 PROCEDURE — ? MEDICATION COUNSELING

## 2022-03-17 PROCEDURE — ? PRESCRIPTION

## 2022-03-17 PROCEDURE — ? ORDER TESTS

## 2022-03-17 RX ORDER — PREDNISONE 20 MG/1
1 TABLET ORAL
Qty: 30 | Refills: 3 | Status: ERX

## 2022-03-17 RX ORDER — CLOBETASOL PROPIONATE 0.5 MG/G
1 OINTMENT TOPICAL QD
Qty: 90 | Refills: 3 | Status: ERX

## 2022-03-17 RX ORDER — DOXYCYCLINE HYCLATE 100 MG/1
1 CAPSULE, GELATIN COATED ORAL BID
Qty: 60 | Refills: 3 | Status: ERX

## 2022-03-17 RX ORDER — NIACINAMIDE 500 MG
1 TABLET ORAL TID
Qty: 60 | Refills: 3 | Status: ERX

## 2022-03-17 NOTE — PROCEDURE: MEDICATION COUNSELING
Fluconazole Counseling:  Patient counseled regarding adverse effects of fluconazole including but not limited to headache, diarrhea, nausea, upset stomach, liver function test abnormalities, taste disturbance, and stomach pain.  There is a rare possibility of liver failure that can occur when taking fluconazole.  The patient understands that monitoring of LFTs and kidney function test may be required, especially at baseline. The patient verbalized understanding of the proper use and possible adverse effects of fluconazole.  All of the patient's questions and concerns were addressed.
Elidel Pregnancy And Lactation Text: This medication is Pregnancy Category C. It is unknown if this medication is excreted in breast milk.
Rhofade Pregnancy And Lactation Text: This medication has not been assigned a Pregnancy Risk Category. It is unknown if the medication is excreted in breast milk.
Clindamycin Pregnancy And Lactation Text: This medication can be used in pregnancy if certain situations. Clindamycin is also present in breast milk.
Oxybutynin Counseling:  I discussed with the patient the risks of oxybutynin including but not limited to skin rash, drowsiness, dry mouth, difficulty urinating, and blurred vision.
Isotretinoin Pregnancy And Lactation Text: This medication is Pregnancy Category X and is considered extremely dangerous during pregnancy. It is unknown if it is excreted in breast milk.
Gabapentin Counseling: I discussed with the patient the risks of gabapentin including but not limited to dizziness, somnolence, fatigue and ataxia.
Rituxan Counseling:  I discussed with the patient the risks of Rituxan infusions. Side effects can include infusion reactions, severe drug rashes including mucocutaneous reactions, reactivation of latent hepatitis and other infections and rarely progressive multifocal leukoencephalopathy.  All of the patient's questions and concerns were addressed.
Hydroxyzine Pregnancy And Lactation Text: This medication is not safe during pregnancy and should not be taken. It is also excreted in breast milk and breast feeding isn't recommended.
Eucrisa Counseling: Patient may experience a mild burning sensation during topical application. Eucrisa is not approved in children less than 2 years of age.
Fluconazole Pregnancy And Lactation Text: This medication is Pregnancy Category C and it isn't know if it is safe during pregnancy. It is also excreted in breast milk.
Azathioprine Pregnancy And Lactation Text: This medication is Pregnancy Category D and isn't considered safe during pregnancy. It is unknown if this medication is excreted in breast milk.
Otezla Pregnancy And Lactation Text: This medication is Pregnancy Category C and it isn't known if it is safe during pregnancy. It is unknown if it is excreted in breast milk.
Doxycycline Counseling:  Patient counseled regarding possible photosensitivity and increased risk for sunburn.  Patient instructed to avoid sunlight, if possible.  When exposed to sunlight, patients should wear protective clothing, sunglasses, and sunscreen.  The patient was instructed to call the office immediately if the following severe adverse effects occur:  hearing changes, easy bruising/bleeding, severe headache, or vision changes.  The patient verbalized understanding of the proper use and possible adverse effects of doxycycline.  All of the patient's questions and concerns were addressed.
Solaraze Counseling:  I discussed with the patient the risks of Solaraze including but not limited to erythema, scaling, itching, weeping, crusting, and pain.
Xolair Pregnancy And Lactation Text: This medication is Pregnancy Category B and is considered safe during pregnancy. This medication is excreted in breast milk.
Infliximab Pregnancy And Lactation Text: This medication is Pregnancy Category B and is considered safe during pregnancy. It is unknown if this medication is excreted in breast milk.
High Dose Vitamin A Counseling: Side effects reviewed, pt to contact office should one occur.
Eucrisa Pregnancy And Lactation Text: This medication has not been assigned a Pregnancy Risk Category but animal studies failed to show danger with the topical medication. It is unknown if the medication is excreted in breast milk.
Hydroxyzine Counseling: Patient advised that the medication is sedating and not to drive a car after taking this medication.  Patient informed of potential adverse effects including but not limited to dry mouth, urinary retention, and blurry vision.  The patient verbalized understanding of the proper use and possible adverse effects of hydroxyzine.  All of the patient's questions and concerns were addressed.
Finasteride Pregnancy And Lactation Text: This medication is absolutely contraindicated during pregnancy. It is unknown if it is excreted in breast milk.
Doxycycline Pregnancy And Lactation Text: This medication is Pregnancy Category D and not consider safe during pregnancy. It is also excreted in breast milk but is considered safe for shorter treatment courses.
Use Enhanced Medication Counseling?: No
Cellcept Counseling:  I discussed with the patient the risks of mycophenolate mofetil including but not limited to infection/immunosuppression, GI upset, hypokalemia, hypercholesterolemia, bone marrow suppression, lymphoproliferative disorders, malignancy, GI ulceration/bleed/perforation, colitis, interstitial lung disease, kidney failure, progressive multifocal leukoencephalopathy, and birth defects.  The patient understands that monitoring is required including a baseline creatinine and regular CBC testing. In addition, patient must alert us immediately if symptoms of infection or other concerning signs are noted.
Otezla Counseling: The side effects of Otezla were discussed with the patient, including but not limited to worsening or new depression, weight loss, diarrhea, nausea, upper respiratory tract infection, and headache. Patient instructed to call the office should any adverse effect occur.  The patient verbalized understanding of the proper use and possible adverse effects of Otezla.  All the patient's questions and concerns were addressed.
Solaraze Pregnancy And Lactation Text: This medication is Pregnancy Category B and is considered safe. There is some data to suggest avoiding during the third trimester. It is unknown if this medication is excreted in breast milk.
Xolair Counseling:  Patient informed of potential adverse effects including but not limited to fever, muscle aches, rash and allergic reactions.  The patient verbalized understanding of the proper use and possible adverse effects of Xolair.  All of the patient's questions and concerns were addressed.
Infliximab Counseling:  I discussed with the patient the risks of infliximab including but not limited to myelosuppression, immunosuppression, autoimmune hepatitis, demyelinating diseases, lymphoma, and serious infections.  The patient understands that monitoring is required including a PPD at baseline and must alert us or the primary physician if symptoms of infection or other concerning signs are noted.
Griseofulvin Counseling:  I discussed with the patient the risks of griseofulvin including but not limited to photosensitivity, cytopenia, liver damage, nausea/vomiting and severe allergy.  The patient understands that this medication is best absorbed when taken with a fatty meal (e.g., ice cream or french fries).
High Dose Vitamin A Pregnancy And Lactation Text: High dose vitamin A therapy is contraindicated during pregnancy and breast feeding.
Doxepin Pregnancy And Lactation Text: This medication is Pregnancy Category C and it isn't known if it is safe during pregnancy. It is also excreted in breast milk and breast feeding isn't recommended.
Opioid Counseling: I discussed with the patient the potential side effects of opioids including but not limited to addiction, altered mental status, and depression. I stressed avoiding alcohol, benzodiazepines, muscle relaxants and sleep aids unless specifically okayed by a physician. The patient verbalized understanding of the proper use and possible adverse effects of opioids. All of the patient's questions and concerns were addressed. They were instructed to flush the remaining pills down the toilet if they did not need them for pain.
Finasteride Male Counseling: Finasteride Counseling:  I discussed with the patient the risks of use of finasteride including but not limited to decreased libido, decreased ejaculate volume, gynecomastia, and depression. Women should not handle medication.  All of the patient's questions and concerns were addressed.
Hydroquinone Counseling:  Patient advised that medication may result in skin irritation, lightening (hypopigmentation), dryness, and burning.  In the event of skin irritation, the patient was advised to reduce the amount of the drug applied or use it less frequently.  Rarely, spots that are treated with hydroquinone can become darker (pseudoochronosis).  Should this occur, patient instructed to stop medication and call the office. The patient verbalized understanding of the proper use and possible adverse effects of hydroquinone.  All of the patient's questions and concerns were addressed.
Valtrex Pregnancy And Lactation Text: this medication is Pregnancy Category B and is considered safe during pregnancy. This medication is not directly found in breast milk but it's metabolite acyclovir is present.
Topical Retinoid counseling:  Patient advised to apply a pea-sized amount only at bedtime and wait 30 minutes after washing their face before applying.  If too drying, patient may add a non-comedogenic moisturizer. The patient verbalized understanding of the proper use and possible adverse effects of retinoids.  All of the patient's questions and concerns were addressed.
Xeljyothiz Pregnancy And Lactation Text: This medication is Pregnancy Category D and is not considered safe during pregnancy.  The risk during breast feeding is also uncertain.
Oral Minoxidil Pregnancy And Lactation Text: This medication should only be used when clearly needed if you are pregnant, attempting to become pregnant or breast feeding.
Ilumya Pregnancy And Lactation Text: The risk during pregnancy and breastfeeding is uncertain with this medication.
Erythromycin Counseling:  I discussed with the patient the risks of erythromycin including but not limited to GI upset, allergic reaction, drug rash, diarrhea, increase in liver enzymes, and yeast infections.
Doxepin Counseling:  Patient advised that the medication is sedating and not to drive a car after taking this medication. Patient informed of potential adverse effects including but not limited to dry mouth, urinary retention, and blurry vision.  The patient verbalized understanding of the proper use and possible adverse effects of doxepin.  All of the patient's questions and concerns were addressed.
Griseofulvin Pregnancy And Lactation Text: This medication is Pregnancy Category X and is known to cause serious birth defects. It is unknown if this medication is excreted in breast milk but breast feeding should be avoided.
Opioid Pregnancy And Lactation Text: These medications can lead to premature delivery and should be avoided during pregnancy. These medications are also present in breast milk in small amounts.
Erivedge Pregnancy And Lactation Text: This medication is Pregnancy Category X and is absolutely contraindicated during pregnancy. It is unknown if it is excreted in breast milk.
Cyclophosphamide Counseling:  I discussed with the patient the risks of cyclophosphamide including but not limited to hair loss, hormonal abnormalities, decreased fertility, abdominal pain, diarrhea, nausea and vomiting, bone marrow suppression and infection. The patient understands that monitoring is required while taking this medication.
Oral Minoxidil Counseling- I discussed with the patient the risks of oral minoxidil including but not limited to shortness of breath, swelling of the feet or ankles, dizziness, lightheadedness, unwanted hair growth and allergic reaction.  The patient verbalized understanding of the proper use and possible adverse effects of oral minoxidil.  All of the patient's questions and concerns were addressed.
Valtrex Counseling: I discussed with the patient the risks of valacyclovir including but not limited to kidney damage, nausea, vomiting and severe allergy.  The patient understands that if the infection seems to be worsening or is not improving, they are to call.
Xeljanz Counseling: I discussed with the patient the risks of Xeljanz therapy including increased risk of infection, liver issues, headache, diarrhea, or cold symptoms. Live vaccines should be avoided. They were instructed to call if they have any problems.
Ilumya Counseling: I discussed with the patient the risks of tildrakizumab including but not limited to immunosuppression, malignancy, posterior leukoencephalopathy syndrome, and serious infections.  The patient understands that monitoring is required including a PPD at baseline and must alert us or the primary physician if symptoms of infection or other concerning signs are noted.
Azelaic Acid Counseling: Patient counseled that medicine may cause skin irritation and to avoid applying near the eyes.  In the event of skin irritation, the patient was advised to reduce the amount of the drug applied or use it less frequently.   The patient verbalized understanding of the proper use and possible adverse effects of azelaic acid.  All of the patient's questions and concerns were addressed.
Erivedge Counseling- I discussed with the patient the risks of Erivedge including but not limited to nausea, vomiting, diarrhea, constipation, weight loss, changes in the sense of taste, decreased appetite, muscle spasms, and hair loss.  The patient verbalized understanding of the proper use and possible adverse effects of Erivedge.  All of the patient's questions and concerns were addressed.
Cimetidine Pregnancy And Lactation Text: This medication is Pregnancy Category B and is considered safe during pregnancy. It is also excreted in breast milk and breast feeding isn't recommended.
Itraconazole Counseling:  I discussed with the patient the risks of itraconazole including but not limited to liver damage, nausea/vomiting, neuropathy, and severe allergy.  The patient understands that this medication is best absorbed when taken with acidic beverages such as non-diet cola or ginger ale.  The patient understands that monitoring is required including baseline LFTs and repeat LFTs at intervals.  The patient understands that they are to contact us or the primary physician if concerning signs are noted.
Erythromycin Pregnancy And Lactation Text: This medication is Pregnancy Category B and is considered safe during pregnancy. It is also excreted in breast milk.
Tranexamic Acid Pregnancy And Lactation Text: It is unknown if this medication is safe during pregnancy or breast feeding.
Imiquimod Counseling:  I discussed with the patient the risks of imiquimod including but not limited to erythema, scaling, itching, weeping, crusting, and pain.  Patient understands that the inflammatory response to imiquimod is variable from person to person and was educated regarded proper titration schedule.  If flu-like symptoms develop, patient knows to discontinue the medication and contact us.
Cyclophosphamide Pregnancy And Lactation Text: This medication is Pregnancy Category D and it isn't considered safe during pregnancy. This medication is excreted in breast milk.
Cimetidine Counseling:  I discussed with the patient the risks of Cimetidine including but not limited to gynecomastia, headache, diarrhea, nausea, drowsiness, arrhythmias, pancreatitis, skin rashes, psychosis, bone marrow suppression and kidney toxicity.
Tazorac Counseling:  Patient advised that medication is irritating and drying.  Patient may need to apply sparingly and wash off after an hour before eventually leaving it on overnight.  The patient verbalized understanding of the proper use and possible adverse effects of tazorac.  All of the patient's questions and concerns were addressed.
Azelaic Acid Pregnancy And Lactation Text: This medication is considered safe during pregnancy and breast feeding.
Odomzo Counseling- I discussed with the patient the risks of Odomzo including but not limited to nausea, vomiting, diarrhea, constipation, weight loss, changes in the sense of taste, decreased appetite, muscle spasms, and hair loss.  The patient verbalized understanding of the proper use and possible adverse effects of Odomzo.  All of the patient's questions and concerns were addressed.
Dutasteride Pregnancy And Lactation Text: This medication is absolutely contraindicated in women, especially during pregnancy and breast feeding. Feminization of male fetuses is possible if taking while pregnant.
Tranexamic Acid Counseling:  Patient advised of the small risk of bleeding problems with tranexamic acid. They were also instructed to call if they developed any nausea, vomiting or diarrhea. All of the patient's questions and concerns were addressed.
Metronidazole Counseling:  I discussed with the patient the risks of metronidazole including but not limited to seizures, nausea/vomiting, a metallic taste in the mouth, nausea/vomiting and severe allergy.
Tremfya Counseling: I discussed with the patient the risks of guselkumab including but not limited to immunosuppression, serious infections, and drug reactions.  The patient understands that monitoring is required including a PPD at baseline and must alert us or the primary physician if symptoms of infection or other concerning signs are noted.
Detail Level: Detailed
Cyclosporine Counseling:  I discussed with the patient the risks of cyclosporine including but not limited to hypertension, gingival hyperplasia,myelosuppression, immunosuppression, liver damage, kidney damage, neurotoxicity, lymphoma, and serious infections. The patient understands that monitoring is required including baseline blood pressure, CBC, CMP, lipid panel and uric acid, and then 1-2 times monthly CMP and blood pressure.
Tazorac Pregnancy And Lactation Text: This medication is not safe during pregnancy. It is unknown if this medication is excreted in breast milk.
Humira Counseling:  I discussed with the patient the risks of adalimumab including but not limited to myelosuppression, immunosuppression, autoimmune hepatitis, demyelinating diseases, lymphoma, and serious infections.  The patient understands that monitoring is required including a PPD at baseline and must alert us or the primary physician if symptoms of infection or other concerning signs are noted.
Benzoyl Peroxide Counseling: Patient counseled that medicine may cause skin irritation and bleach clothing.  In the event of skin irritation, the patient was advised to reduce the amount of the drug applied or use it less frequently.   The patient verbalized understanding of the proper use and possible adverse effects of benzoyl peroxide.  All of the patient's questions and concerns were addressed.
Dutasteride Male Counseling: Dustasteride Counseling:  I discussed with the patient the risks of use of dutasteride including but not limited to decreased libido, decreased ejaculate volume, and gynecomastia. Women who can become pregnant should not handle medication.  All of the patient's questions and concerns were addressed.
Klisyri Counseling:  I discussed with the patient the risks of Klisyri including but not limited to erythema, scaling, itching, weeping, crusting, and pain.
Cyclosporine Pregnancy And Lactation Text: This medication is Pregnancy Category C and it isn't know if it is safe during pregnancy. This medication is excreted in breast milk.
Nsaids Pregnancy And Lactation Text: These medications are considered safe up to 30 weeks gestation. It is excreted in breast milk.
Topical Clindamycin Counseling: Patient counseled that this medication may cause skin irritation or allergic reactions.  In the event of skin irritation, the patient was advised to reduce the amount of the drug applied or use it less frequently.   The patient verbalized understanding of the proper use and possible adverse effects of clindamycin.  All of the patient's questions and concerns were addressed.
Metronidazole Pregnancy And Lactation Text: This medication is Pregnancy Category B and considered safe during pregnancy.  It is also excreted in breast milk.
Benzoyl Peroxide Pregnancy And Lactation Text: This medication is Pregnancy Category C. It is unknown if benzoyl peroxide is excreted in breast milk.
Klisyri Pregnancy And Lactation Text: It is unknown if this medication can harm a developing fetus or if it is excreted in breast milk.
Dapsone Pregnancy And Lactation Text: This medication is Pregnancy Category C and is not considered safe during pregnancy or breast feeding.
Methotrexate Counseling:  Patient counseled regarding adverse effects of methotrexate including but not limited to nausea, vomiting, abnormalities in liver function tests. Patients may develop mouth sores, rash, diarrhea, and abnormalities in blood counts. The patient understands that monitoring is required including LFT's and blood counts.  There is a rare possibility of scarring of the liver and lung problems that can occur when taking methotrexate. Persistent nausea, loss of appetite, pale stools, dark urine, cough, and shortness of breath should be reported immediately. Patient advised to discontinue methotrexate treatment at least three months before attempting to become pregnant.  I discussed the need for folate supplements while taking methotrexate.  These supplements can decrease side effects during methotrexate treatment. The patient verbalized understanding of the proper use and possible adverse effects of methotrexate.  All of the patient's questions and concerns were addressed.
Thalidomide Counseling: I discussed with the patient the risks of thalidomide including but not limited to birth defects, anxiety, weakness, chest pain, dizziness, cough and severe allergy.
Minocycline Counseling: Patient advised regarding possible photosensitivity and discoloration of the teeth, skin, lips, tongue and gums.  Patient instructed to avoid sunlight, if possible.  When exposed to sunlight, patients should wear protective clothing, sunglasses, and sunscreen.  The patient was instructed to call the office immediately if the following severe adverse effects occur:  hearing changes, easy bruising/bleeding, severe headache, or vision changes.  The patient verbalized understanding of the proper use and possible adverse effects of minocycline.  All of the patient's questions and concerns were addressed.
Nsaids Counseling: NSAID Counseling: I discussed with the patient that NSAIDs should be taken with food. Prolonged use of NSAIDs can result in the development of stomach ulcers.  Patient advised to stop taking NSAIDs if abdominal pain occurs.  The patient verbalized understanding of the proper use and possible adverse effects of NSAIDs.  All of the patient's questions and concerns were addressed.
Topical Clindamycin Pregnancy And Lactation Text: This medication is Pregnancy Category B and is considered safe during pregnancy. It is unknown if it is excreted in breast milk.
Taltz Counseling: I discussed with the patient the risks of ixekizumab including but not limited to immunosuppression, serious infections, worsening of inflammatory bowel disease and drug reactions.  The patient understands that monitoring is required including a PPD at baseline and must alert us or the primary physician if symptoms of infection or other concerning signs are noted.
Enbrel Counseling:  I discussed with the patient the risks of etanercept including but not limited to myelosuppression, immunosuppression, autoimmune hepatitis, demyelinating diseases, lymphoma, and infections.  The patient understands that monitoring is required including a PPD at baseline and must alert us or the primary physician if symptoms of infection or other concerning signs are noted.
Carac Counseling:  I discussed with the patient the risks of Carac including but not limited to erythema, scaling, itching, weeping, crusting, and pain.
Terbinafine Counseling: Patient counseling regarding adverse effects of terbinafine including but not limited to headache, diarrhea, rash, upset stomach, liver function test abnormalities, itching, taste/smell disturbance, nausea, abdominal pain, and flatulence.  There is a rare possibility of liver failure that can occur when taking terbinafine.  The patient understands that a baseline LFT and kidney function test may be required. The patient verbalized understanding of the proper use and possible adverse effects of terbinafine.  All of the patient's questions and concerns were addressed.
Dapsone Counseling: I discussed with the patient the risks of dapsone including but not limited to hemolytic anemia, agranulocytosis, rashes, methemoglobinemia, kidney failure, peripheral neuropathy, headaches, GI upset, and liver toxicity.  Patients who start dapsone require monitoring including baseline LFTs and weekly CBCs for the first month, then every month thereafter.  The patient verbalized understanding of the proper use and possible adverse effects of dapsone.  All of the patient's questions and concerns were addressed.
Quinolones Counseling:  I discussed with the patient the risks of fluoroquinolones including but not limited to GI upset, allergic reaction, drug rash, diarrhea, dizziness, photosensitivity, yeast infections, liver function test abnormalities, tendonitis/tendon rupture.
Methotrexate Pregnancy And Lactation Text: This medication is Pregnancy Category X and is known to cause fetal harm. This medication is excreted in breast milk.
Niacinamide Pregnancy And Lactation Text: These medications are considered safe during pregnancy.
Sski Pregnancy And Lactation Text: This medication is Pregnancy Category D and isn't considered safe during pregnancy. It is excreted in breast milk.
Minoxidil Counseling: Minoxidil is a topical medication which can increase blood flow where it is applied. It is uncertain how this medication increases hair growth. Side effects are uncommon and include stinging and allergic reactions.
Topical Ketoconazole Counseling: Patient counseled that this medication may cause skin irritation or allergic reactions.  In the event of skin irritation, the patient was advised to reduce the amount of the drug applied or use it less frequently.   The patient verbalized understanding of the proper use and possible adverse effects of ketoconazole.  All of the patient's questions and concerns were addressed.
Dupixent Pregnancy And Lactation Text: This medication likely crosses the placenta but the risk for the fetus is uncertain. This medication is excreted in breast milk.
Minocycline Pregnancy And Lactation Text: This medication is Pregnancy Category D and not consider safe during pregnancy. It is also excreted in breast milk.
Carac Pregnancy And Lactation Text: This medication is Pregnancy Category X and contraindicated in pregnancy and in women who may become pregnant. It is unknown if this medication is excreted in breast milk.
Ketoconazole Pregnancy And Lactation Text: This medication is Pregnancy Category C and it isn't know if it is safe during pregnancy. It is also excreted in breast milk and breast feeding isn't recommended.
Colchicine Pregnancy And Lactation Text: This medication is Pregnancy Category C and isn't considered safe during pregnancy. It is excreted in breast milk.
SSKI Counseling:  I discussed with the patient the risks of SSKI including but not limited to thyroid abnormalities, metallic taste, GI upset, fever, headache, acne, arthralgias, paraesthesias, lymphadenopathy, easy bleeding, arrhythmias, and allergic reaction.
Cimzia Counseling:  I discussed with the patient the risks of Cimzia including but not limited to immunosuppression, allergic reactions and infections.  The patient understands that monitoring is required including a PPD at baseline and must alert us or the primary physician if symptoms of infection or other concerning signs are noted.
Niacinamide Counseling: I recommended taking niacin or niacinamide, also know as vitamin B3, twice daily. Recent evidence suggests that taking vitamin B3 (500 mg twice daily) can reduce the risk of actinic keratoses and non-melanoma skin cancers. Side effects of vitamin B3 include flushing and headache.
Stelara Counseling:  I discussed with the patient the risks of ustekinumab including but not limited to immunosuppression, malignancy, posterior leukoencephalopathy syndrome, and serious infections.  The patient understands that monitoring is required including a PPD at baseline and must alert us or the primary physician if symptoms of infection or other concerning signs are noted.
Dupixent Counseling: I discussed with the patient the risks of dupilumab including but not limited to eye infection and irritation, cold sores, injection site reactions, worsening of asthma, allergic reactions and increased risk of parasitic infection.  Live vaccines should be avoided while taking dupilumab. Dupilumab will also interact with certain medications such as warfarin and cyclosporine. The patient understands that monitoring is required and they must alert us or the primary physician if symptoms of infection or other concerning signs are noted.
Prednisone Counseling:  I discussed with the patient the risks of prolonged use of prednisone including but not limited to weight gain, insomnia, osteoporosis, mood changes, diabetes, susceptibility to infection, glaucoma and high blood pressure.  In cases where prednisone use is prolonged, patients should be monitored with blood pressure checks, serum glucose levels and an eye exam.  Additionally, the patient may need to be placed on GI prophylaxis, PCP prophylaxis, and calcium and vitamin D supplementation and/or a bisphosphonate.  The patient verbalized understanding of the proper use and the possible adverse effects of prednisone.  All of the patient's questions and concerns were addressed.
Ketoconazole Counseling:   Patient counseled regarding improving absorption with orange juice.  Adverse effects include but are not limited to breast enlargement, headache, diarrhea, nausea, upset stomach, liver function test abnormalities, taste disturbance, and stomach pain.  There is a rare possibility of liver failure that can occur when taking ketoconazole. The patient understands that monitoring of LFTs may be required, especially at baseline. The patient verbalized understanding of the proper use and possible adverse effects of ketoconazole.  All of the patient's questions and concerns were addressed.
Calcipotriene Counseling:  I discussed with the patient the risks of calcipotriene including but not limited to erythema, scaling, itching, and irritation.
Spironolactone Pregnancy And Lactation Text: This medication can cause feminization of the male fetus and should be avoided during pregnancy. The active metabolite is also found in breast milk.
Colchicine Counseling:  Patient counseled regarding adverse effects including but not limited to stomach upset (nausea, vomiting, stomach pain, or diarrhea).  Patient instructed to limit alcohol consumption while taking this medication.  Colchicine may reduce blood counts especially with prolonged use.  The patient understands that monitoring of kidney function and blood counts may be required, especially at baseline. The patient verbalized understanding of the proper use and possible adverse effects of colchicine.  All of the patient's questions and concerns were addressed.
Mirvaso Counseling: Mirvaso is a topical medication which can decrease superficial blood flow where applied. Side effects are uncommon and include stinging, redness and allergic reactions.
Libtayo Pregnancy And Lactation Text: This medication is contraindicated in pregnancy and when breast feeding.
Azithromycin Counseling:  I discussed with the patient the risks of azithromycin including but not limited to GI upset, allergic reaction, drug rash, diarrhea, and yeast infections.
Rifampin Counseling: I discussed with the patient the risks of rifampin including but not limited to liver damage, kidney damage, red-orange body fluids, nausea/vomiting and severe allergy.
Topical Sulfur Applications Counseling: Topical Sulfur Counseling: Patient counseled that this medication may cause skin irritation or allergic reactions.  In the event of skin irritation, the patient was advised to reduce the amount of the drug applied or use it less frequently.   The patient verbalized understanding of the proper use and possible adverse effects of topical sulfur application.  All of the patient's questions and concerns were addressed.
Calcipotriene Pregnancy And Lactation Text: This medication has not been proven safe during pregnancy. It is unknown if this medication is excreted in breast milk.
Spironolactone Counseling: Patient advised regarding risks of diarrhea, abdominal pain, hyperkalemia, birth defects (for female patients), liver toxicity and renal toxicity. The patient may need blood work to monitor liver and kidney function and potassium levels while on therapy. The patient verbalized understanding of the proper use and possible adverse effects of spironolactone.  All of the patient's questions and concerns were addressed.
Cosentyx Counseling:  I discussed with the patient the risks of Cosentyx including but not limited to worsening of Crohn's disease, immunosuppression, allergic reactions and infections.  The patient understands that monitoring is required including a PPD at baseline and must alert us or the primary physician if symptoms of infection or other concerning signs are noted.
Libtayo Counseling- I discussed with the patient the risks of Libtayo including but not limited to nausea, vomiting, diarrhea, and bone or muscle pain.  The patient verbalized understanding of the proper use and possible adverse effects of Libtayo.  All of the patient's questions and concerns were addressed.
Topical Sulfur Applications Pregnancy And Lactation Text: This medication is Pregnancy Category C and has an unknown safety profile during pregnancy. It is unknown if this topical medication is excreted in breast milk.
Skyrizi Counseling: I discussed with the patient the risks of risankizumab-rzaa including but not limited to immunosuppression, and serious infections.  The patient understands that monitoring is required including a PPD at baseline and must alert us or the primary physician if symptoms of infection or other concerning signs are noted.
Rifampin Pregnancy And Lactation Text: This medication is Pregnancy Category C and it isn't know if it is safe during pregnancy. It is also excreted in breast milk and should not be used if you are breast feeding.
5-Fu Counseling: 5-Fluorouracil Counseling:  I discussed with the patient the risks of 5-fluorouracil including but not limited to erythema, scaling, itching, weeping, crusting, and pain.
Azithromycin Pregnancy And Lactation Text: This medication is considered safe during pregnancy and is also secreted in breast milk.
Picato Counseling:  I discussed with the patient the risks of Picato including but not limited to erythema, scaling, itching, weeping, crusting, and pain.
Cimzia Pregnancy And Lactation Text: This medication crosses the placenta but can be considered safe in certain situations. Cimzia may be excreted in breast milk.
Clofazimine Counseling:  I discussed with the patient the risks of clofazimine including but not limited to skin and eye pigmentation, liver damage, nausea/vomiting, gastrointestinal bleeding and allergy.
Birth Control Pills Pregnancy And Lactation Text: This medication should be avoided if pregnant and for the first 30 days post-partum.
Acitretin Counseling:  I discussed with the patient the risks of acitretin including but not limited to hair loss, dry lips/skin/eyes, liver damage, hyperlipidemia, depression/suicidal ideation, photosensitivity.  Serious rare side effects can include but are not limited to pancreatitis, pseudotumor cerebri, bony changes, clot formation/stroke/heart attack.  Patient understands that alcohol is contraindicated since it can result in liver toxicity and significantly prolong the elimination of the drug by many years.
Hydroxychloroquine Pregnancy And Lactation Text: This medication has been shown to cause fetal harm but it isn't assigned a Pregnancy Risk Category. There are small amounts excreted in breast milk.
Wartpeel Counseling:  I discussed with the patient the risks of Wartpeel including but not limited to erythema, scaling, itching, weeping, crusting, and pain.
Bactrim Counseling:  I discussed with the patient the risks of sulfa antibiotics including but not limited to GI upset, allergic reaction, drug rash, diarrhea, dizziness, photosensitivity, and yeast infections.  Rarely, more serious reactions can occur including but not limited to aplastic anemia, agranulocytosis, methemoglobinemia, blood dyscrasias, liver or kidney failure, lung infiltrates or desquamative/blistering drug rashes.
Ivermectin Pregnancy And Lactation Text: This medication is Pregnancy Category C and it isn't known if it is safe during pregnancy. It is also excreted in breast milk.
Sarecycline Counseling: Patient advised regarding possible photosensitivity and discoloration of the teeth, skin, lips, tongue and gums.  Patient instructed to avoid sunlight, if possible.  When exposed to sunlight, patients should wear protective clothing, sunglasses, and sunscreen.  The patient was instructed to call the office immediately if the following severe adverse effects occur:  hearing changes, easy bruising/bleeding, severe headache, or vision changes.  The patient verbalized understanding of the proper use and possible adverse effects of sarecycline.  All of the patient's questions and concerns were addressed.
Birth Control Pills Counseling: Birth Control Pill Counseling: I discussed with the patient the potential side effects of OCPs including but not limited to increased risk of stroke, heart attack, thrombophlebitis, deep venous thrombosis, hepatic adenomas, breast changes, GI upset, headaches, and depression.  The patient verbalized understanding of the proper use and possible adverse effects of OCPs. All of the patient's questions and concerns were addressed.
Simponi Counseling:  I discussed with the patient the risks of golimumab including but not limited to myelosuppression, immunosuppression, autoimmune hepatitis, demyelinating diseases, lymphoma, and serious infections.  The patient understands that monitoring is required including a PPD at baseline and must alert us or the primary physician if symptoms of infection or other concerning signs are noted.
Hydroxychloroquine Counseling:  I discussed with the patient that a baseline ophthalmologic exam is needed at the start of therapy and every year thereafter while on therapy. A CBC may also be warranted for monitoring.  The side effects of this medication were discussed with the patient, including but not limited to agranulocytosis, aplastic anemia, seizures, rashes, retinopathy, and liver toxicity. Patient instructed to call the office should any adverse effect occur.  The patient verbalized understanding of the proper use and possible adverse effects of Plaquenil.  All the patient's questions and concerns were addressed.
Acitretin Pregnancy And Lactation Text: This medication is Pregnancy Category X and should not be given to women who are pregnant or may become pregnant in the future. This medication is excreted in breast milk.
Drysol Counseling:  I discussed with the patient the risks of drysol/aluminum chloride including but not limited to skin rash, itching, irritation, burning.
Ivermectin Counseling:  Patient instructed to take medication on an empty stomach with a full glass of water.  Patient informed of potential adverse effects including but not limited to nausea, diarrhea, dizziness, itching, and swelling of the extremities or lymph nodes.  The patient verbalized understanding of the proper use and possible adverse effects of ivermectin.  All of the patient's questions and concerns were addressed.
Bactrim Pregnancy And Lactation Text: This medication is Pregnancy Category D and is known to cause fetal risk.  It is also excreted in breast milk.
Arava Counseling:  Patient counseled regarding adverse effects of Arava including but not limited to nausea, vomiting, abnormalities in liver function tests. Patients may develop mouth sores, rash, diarrhea, and abnormalities in blood counts. The patient understands that monitoring is required including LFTs and blood counts.  There is a rare possibility of scarring of the liver and lung problems that can occur when taking methotrexate. Persistent nausea, loss of appetite, pale stools, dark urine, cough, and shortness of breath should be reported immediately. Patient advised to discontinue Arava treatment and consult with a physician prior to attempting conception. The patient will have to undergo a treatment to eliminate Arava from the body prior to conception.
Protopic Counseling: Patient may experience a mild burning sensation during topical application. Protopic is not approved in children less than 2 years of age. There have been case reports of hematologic and skin malignancies in patients using topical calcineurin inhibitors although causality is questionable.
Propranolol Pregnancy And Lactation Text: This medication is Pregnancy Category C and it isn't known if it is safe during pregnancy. It is excreted in breast milk.
Winlevi Counseling:  I discussed with the patient the risks of topical clascoterone including but not limited to erythema, scaling, itching, and stinging. Patient voiced their understanding.
Bexarotene Counseling:  I discussed with the patient the risks of bexarotene including but not limited to hair loss, dry lips/skin/eyes, liver abnormalities, hyperlipidemia, pancreatitis, depression/suicidal ideation, photosensitivity, drug rash/allergic reactions, hypothyroidism, anemia, leukopenia, infection, cataracts, and teratogenicity.  Patient understands that they will need regular blood tests to check lipid profile, liver function tests, white blood cell count, thyroid function tests and pregnancy test if applicable.
Glycopyrrolate Pregnancy And Lactation Text: This medication is Pregnancy Category B and is considered safe during pregnancy. It is unknown if it is excreted breast milk.
Propranolol Counseling:  I discussed with the patient the risks of propranolol including but not limited to low heart rate, low blood pressure, low blood sugar, restlessness and increased cold sensitivity. They should call the office if they experience any of these side effects.
Protopic Pregnancy And Lactation Text: This medication is Pregnancy Category C. It is unknown if this medication is excreted in breast milk when applied topically.
Tetracycline Counseling: Patient counseled regarding possible photosensitivity and increased risk for sunburn.  Patient instructed to avoid sunlight, if possible.  When exposed to sunlight, patients should wear protective clothing, sunglasses, and sunscreen.  The patient was instructed to call the office immediately if the following severe adverse effects occur:  hearing changes, easy bruising/bleeding, severe headache, or vision changes.  The patient verbalized understanding of the proper use and possible adverse effects of tetracycline.  All of the patient's questions and concerns were addressed. Patient understands to avoid pregnancy while on therapy due to potential birth defects.
Siliq Counseling:  I discussed with the patient the risks of Siliq including but not limited to new or worsening depression, suicidal thoughts and behavior, immunosuppression, malignancy, posterior leukoencephalopathy syndrome, and serious infections.  The patient understands that monitoring is required including a PPD at baseline and must alert us or the primary physician if symptoms of infection or other concerning signs are noted. There is also a special program designed to monitor depression which is required with Siliq.
Cephalexin Counseling: I counseled the patient regarding use of cephalexin as an antibiotic for prophylactic and/or therapeutic purposes. Cephalexin (commonly prescribed under brand name Keflex) is a cephalosporin antibiotic which is active against numerous classes of bacteria, including most skin bacteria. Side effects may include nausea, diarrhea, gastrointestinal upset, rash, hives, yeast infections, and in rare cases, hepatitis, kidney disease, seizures, fever, confusion, neurologic symptoms, and others. Patients with severe allergies to penicillin medications are cautioned that there is about a 10% incidence of cross-reactivity with cephalosporins. When possible, patients with penicillin allergies should use alternatives to cephalosporins for antibiotic therapy.
Bexarotene Pregnancy And Lactation Text: This medication is Pregnancy Category X and should not be given to women who are pregnant or may become pregnant. This medication should not be used if you are breast feeding.
Clindamycin Counseling: I counseled the patient regarding use of clindamycin as an antibiotic for prophylactic and/or therapeutic purposes. Clindamycin is active against numerous classes of bacteria, including skin bacteria. Side effects may include nausea, diarrhea, gastrointestinal upset, rash, hives, yeast infections, and in rare cases, colitis.
Glycopyrrolate Counseling:  I discussed with the patient the risks of glycopyrrolate including but not limited to skin rash, drowsiness, dry mouth, difficulty urinating, and blurred vision.
Winlevi Pregnancy And Lactation Text: This medication is considered safe during pregnancy and breastfeeding.
Albendazole Counseling:  I discussed with the patient the risks of albendazole including but not limited to cytopenia, kidney damage, nausea/vomiting and severe allergy.  The patient understands that this medication is being used in an off-label manner.
Elidel Counseling: Patient may experience a mild burning sensation during topical application. Elidel is not approved in children less than 2 years of age. There have been case reports of hematologic and skin malignancies in patients using topical calcineurin inhibitors although causality is questionable.
Rhofade Counseling: Rhofade is a topical medication which can decrease superficial blood flow where applied. Side effects are uncommon and include stinging, redness and allergic reactions.
Azathioprine Counseling:  I discussed with the patient the risks of azathioprine including but not limited to myelosuppression, immunosuppression, hepatotoxicity, lymphoma, and infections.  The patient understands that monitoring is required including baseline LFTs, Creatinine, possible TPMP genotyping and weekly CBCs for the first month and then every 2 weeks thereafter.  The patient verbalized understanding of the proper use and possible adverse effects of azathioprine.  All of the patient's questions and concerns were addressed.
Rituxan Pregnancy And Lactation Text: This medication is Pregnancy Category C and it isn't know if it is safe during pregnancy. It is unknown if this medication is excreted in breast milk but similar antibodies are known to be excreted.
Isotretinoin Counseling: Patient should get monthly blood tests, not donate blood, not drive at night if vision affected, not share medication, and not undergo elective surgery for 6 months after tx completed. Side effects reviewed, pt to contact office should one occur.
Zyclara Counseling:  I discussed with the patient the risks of imiquimod including but not limited to erythema, scaling, itching, weeping, crusting, and pain.  Patient understands that the inflammatory response to imiquimod is variable from person to person and was educated regarded proper titration schedule.  If flu-like symptoms develop, patient knows to discontinue the medication and contact us.
Cephalexin Pregnancy And Lactation Text: This medication is Pregnancy Category B and considered safe during pregnancy.  It is also excreted in breast milk but can be used safely for shorter doses.

## 2022-03-17 NOTE — PROCEDURE: ORDER TESTS
Billing Type: Third-Party Bill
Expected Date Of Service: 03/17/2022
Performing Laboratory: 0
Clinical Notes (To The Lab): Standing order for CBC weekly for 4 weeks. Please call Nancy with questions, [phone]
Bill For Surgical Tray: no

## 2022-03-18 ENCOUNTER — RX ONLY (OUTPATIENT)
Age: 72
Setting detail: RX ONLY
End: 2022-03-18

## 2022-03-18 RX ORDER — DAPSONE 25 MG/1
TABLET ORAL
Qty: 60 | Refills: 1 | Status: ERX

## 2022-04-28 ENCOUNTER — APPOINTMENT (RX ONLY)
Dept: URBAN - METROPOLITAN AREA CLINIC 6 | Facility: CLINIC | Age: 72
Setting detail: DERMATOLOGY
End: 2022-04-28

## 2022-04-28 DIAGNOSIS — L12.0 BULLOUS PEMPHIGOID: ICD-10-CM

## 2022-04-28 PROCEDURE — 99214 OFFICE O/P EST MOD 30 MIN: CPT

## 2022-04-28 PROCEDURE — ? COUNSELING

## 2022-04-28 PROCEDURE — ? ORDER TESTS

## 2022-04-28 PROCEDURE — ? PRESCRIPTION

## 2022-04-28 RX ORDER — NIACINAMIDE 500 MG
1 TABLET ORAL TID
Qty: 120 | Refills: 5 | Status: ERX

## 2022-04-28 RX ORDER — DAPSONE 100 MG/1
1 TABLET ORAL QD
Qty: 60 | Refills: 3 | Status: ERX

## 2022-04-28 RX ORDER — FAMOTIDINE 20 MG/1
1 TABLET, FILM COATED ORAL BID
Qty: 120 | Refills: 5 | Status: ERX | COMMUNITY
Start: 2022-04-28

## 2022-04-28 RX ADMIN — FAMOTIDINE 1: 20 TABLET, FILM COATED ORAL at 00:00

## 2022-04-28 ASSESSMENT — LOCATION ZONE DERM
LOCATION ZONE: ARM
LOCATION ZONE: TRUNK

## 2022-04-28 ASSESSMENT — LOCATION SIMPLE DESCRIPTION DERM
LOCATION SIMPLE: UPPER BACK
LOCATION SIMPLE: ABDOMEN
LOCATION SIMPLE: LEFT UPPER ARM
LOCATION SIMPLE: RIGHT UPPER ARM

## 2022-04-28 ASSESSMENT — LOCATION DETAILED DESCRIPTION DERM
LOCATION DETAILED: LEFT DISTAL POSTERIOR UPPER ARM
LOCATION DETAILED: INFERIOR THORACIC SPINE
LOCATION DETAILED: RIGHT DISTAL POSTERIOR UPPER ARM
LOCATION DETAILED: EPIGASTRIC SKIN

## 2022-05-03 ENCOUNTER — RX ONLY (OUTPATIENT)
Age: 72
Setting detail: RX ONLY
End: 2022-05-03

## 2022-05-03 RX ORDER — PREDNISONE 20 MG/1
TABLET ORAL
Qty: 90 | Refills: 2 | Status: ERX

## 2022-06-15 ENCOUNTER — APPOINTMENT (RX ONLY)
Dept: URBAN - METROPOLITAN AREA CLINIC 6 | Facility: CLINIC | Age: 72
Setting detail: DERMATOLOGY
End: 2022-06-15

## 2022-06-15 DIAGNOSIS — L12.0 BULLOUS PEMPHIGOID: ICD-10-CM | Status: IMPROVED

## 2022-06-15 PROCEDURE — ? COUNSELING

## 2022-06-15 PROCEDURE — ? ADDITIONAL NOTES

## 2022-06-15 PROCEDURE — 99213 OFFICE O/P EST LOW 20 MIN: CPT

## 2022-06-15 PROCEDURE — ? ORDER TESTS

## 2022-06-15 ASSESSMENT — LOCATION SIMPLE DESCRIPTION DERM
LOCATION SIMPLE: RIGHT UPPER ARM
LOCATION SIMPLE: UPPER BACK
LOCATION SIMPLE: LEFT UPPER ARM
LOCATION SIMPLE: ABDOMEN

## 2022-06-15 ASSESSMENT — LOCATION DETAILED DESCRIPTION DERM
LOCATION DETAILED: RIGHT DISTAL POSTERIOR UPPER ARM
LOCATION DETAILED: EPIGASTRIC SKIN
LOCATION DETAILED: INFERIOR THORACIC SPINE
LOCATION DETAILED: LEFT DISTAL POSTERIOR UPPER ARM

## 2022-06-15 ASSESSMENT — LOCATION ZONE DERM
LOCATION ZONE: TRUNK
LOCATION ZONE: ARM

## 2022-06-15 NOTE — PROCEDURE: ADDITIONAL NOTES
Render Risk Assessment In Note?: no
Detail Level: Simple
Additional Notes: Patient presents for follow up and has improved. Patient is still taking prednisone 20mg, Niacinamide, and Dapsone. Discussed getting off the prednisone. Advised for patient to get a pill cutter and cutting the prednisone tablets in half and taking 10mg of prednisone.

## 2022-06-15 NOTE — PROCEDURE: ORDER TESTS
Expected Date Of Service: 06/15/2022
Bill For Surgical Tray: no
Billing Type: Third-Party Bill
Performing Laboratory: 0

## 2022-09-06 ENCOUNTER — APPOINTMENT (RX ONLY)
Dept: URBAN - METROPOLITAN AREA CLINIC 6 | Facility: CLINIC | Age: 72
Setting detail: DERMATOLOGY
End: 2022-09-06

## 2022-09-06 DIAGNOSIS — L12.0 BULLOUS PEMPHIGOID: ICD-10-CM | Status: WELL CONTROLLED

## 2022-09-06 PROCEDURE — ? PRESCRIPTION MEDICATION MANAGEMENT

## 2022-09-06 PROCEDURE — ? ORDER TESTS

## 2022-09-06 PROCEDURE — ? PHOTO-DOCUMENTATION

## 2022-09-06 PROCEDURE — 99214 OFFICE O/P EST MOD 30 MIN: CPT

## 2022-09-06 PROCEDURE — ? COUNSELING

## 2022-09-06 PROCEDURE — ? DIAGNOSIS COMMENT

## 2022-09-06 PROCEDURE — ? PRESCRIPTION

## 2022-09-06 RX ORDER — DAPSONE 100 MG/1
TABLET ORAL
Qty: 30 | Refills: 2 | Status: ERX

## 2022-09-06 RX ORDER — PREDNISONE 10 MG/1
TABLET ORAL
Qty: 30 | Refills: 2 | Status: ERX

## 2022-09-06 RX ORDER — CLOBETASOL PROPIONATE 0.5 MG/G
OINTMENT TOPICAL
Qty: 60 | Refills: 3 | Status: ERX | COMMUNITY
Start: 2022-09-06

## 2022-09-06 RX ADMIN — CLOBETASOL PROPIONATE 1: 0.5 OINTMENT TOPICAL at 00:00

## 2022-09-06 ASSESSMENT — BSA RASH: BSA RASH: 8

## 2022-09-06 ASSESSMENT — LOCATION DETAILED DESCRIPTION DERM
LOCATION DETAILED: LEFT DISTAL POSTERIOR UPPER ARM
LOCATION DETAILED: INFERIOR THORACIC SPINE
LOCATION DETAILED: EPIGASTRIC SKIN
LOCATION DETAILED: RIGHT DISTAL POSTERIOR UPPER ARM

## 2022-09-06 ASSESSMENT — LOCATION SIMPLE DESCRIPTION DERM
LOCATION SIMPLE: UPPER BACK
LOCATION SIMPLE: RIGHT UPPER ARM
LOCATION SIMPLE: LEFT UPPER ARM
LOCATION SIMPLE: ABDOMEN

## 2022-09-06 ASSESSMENT — LOCATION ZONE DERM
LOCATION ZONE: TRUNK
LOCATION ZONE: ARM

## 2022-09-06 ASSESSMENT — SEVERITY ASSESSMENT: SEVERITY: MILD

## 2022-09-06 NOTE — PROCEDURE: DIAGNOSIS COMMENT
Detail Level: Zone
Render Risk Assessment In Note?: no
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 Denies any recent flares, denies any side effects from therapy including fatigue, shortness of breath or peripheral neuropathy.

## 2022-09-06 NOTE — PROCEDURE: PRESCRIPTION MEDICATION MANAGEMENT
Initiate Treatment: Prednisone 10mg QD.
Discontinue Regimen: Doxycycline + nicotinamide (was dispensed niacin from pharmacy) and famotidine.
Continue Regimen: Dapsone 100mg QD and Clobetasol 0.05% ointment 1-2 times daily.
Plan: Will check labs at today’s visit, will likely continue to maintain on dapsone and d/c steroids at next visit. \\n\\nWill consider MTX, azathioprine or Dupixent if lack of improvement.
Detail Level: Zone
Render In Strict Bullet Format?: No

## 2022-09-06 NOTE — PROCEDURE: ORDER TESTS
Bill For Surgical Tray: no
Performing Laboratory: 0
Expected Date Of Service: 09/06/2022
Billing Type: Third-Party Bill

## 2022-12-15 ENCOUNTER — APPOINTMENT (RX ONLY)
Dept: URBAN - METROPOLITAN AREA CLINIC 6 | Facility: CLINIC | Age: 72
Setting detail: DERMATOLOGY
End: 2022-12-15

## 2022-12-15 DIAGNOSIS — L12.0 BULLOUS PEMPHIGOID: ICD-10-CM | Status: INADEQUATELY CONTROLLED

## 2022-12-15 PROCEDURE — ? PHOTO-DOCUMENTATION

## 2022-12-15 PROCEDURE — ? PRESCRIPTION

## 2022-12-15 PROCEDURE — 99214 OFFICE O/P EST MOD 30 MIN: CPT

## 2022-12-15 PROCEDURE — ? PRESCRIPTION MEDICATION MANAGEMENT

## 2022-12-15 PROCEDURE — ? ORDER TESTS

## 2022-12-15 PROCEDURE — ? COUNSELING

## 2022-12-15 PROCEDURE — ? DIAGNOSIS COMMENT

## 2022-12-15 RX ORDER — PREDNISONE 20 MG/1
TABLET ORAL
Qty: 30 | Refills: 2 | Status: ERX | COMMUNITY
Start: 2022-12-15

## 2022-12-15 RX ORDER — DAPSONE 100 MG/1
TABLET ORAL
Qty: 30 | Refills: 2 | Status: ERX | COMMUNITY
Start: 2022-12-15

## 2022-12-15 RX ADMIN — PREDNISONE 2: 20 TABLET ORAL at 00:00

## 2022-12-15 RX ADMIN — DAPSONE 1: 100 TABLET ORAL at 00:00

## 2022-12-15 ASSESSMENT — LOCATION DETAILED DESCRIPTION DERM
LOCATION DETAILED: RIGHT DISTAL POSTERIOR UPPER ARM
LOCATION DETAILED: LEFT DISTAL POSTERIOR UPPER ARM
LOCATION DETAILED: INFERIOR THORACIC SPINE
LOCATION DETAILED: EPIGASTRIC SKIN

## 2022-12-15 ASSESSMENT — LOCATION SIMPLE DESCRIPTION DERM
LOCATION SIMPLE: ABDOMEN
LOCATION SIMPLE: RIGHT UPPER ARM
LOCATION SIMPLE: UPPER BACK
LOCATION SIMPLE: LEFT UPPER ARM

## 2022-12-15 ASSESSMENT — LOCATION ZONE DERM
LOCATION ZONE: ARM
LOCATION ZONE: TRUNK

## 2022-12-15 NOTE — PROCEDURE: ORDER TESTS
Bill For Surgical Tray: no
Billing Type: Third-Party Bill
Expected Date Of Service: 12/15/2022
Performing Laboratory: 0

## 2022-12-15 NOTE — PROCEDURE: PRESCRIPTION MEDICATION MANAGEMENT
Initiate Treatment: Prednisone 20mg QD (increase from 10mg), recommended calcium and vitamin D supplementation.
Continue Regimen: Dapsone 100mg QD and Clobetasol 0.05% ointment QD. Continue famotidine QD.
Plan: Will check labs at today’s visit, will likely continue to maintain on dapsone (increase to 200mg QD) and d/c steroids at next visit. \\n\\nWill consider MTX, azathioprine or Dupixent if lack of improvement.
Detail Level: Zone
Render In Strict Bullet Format?: No

## 2023-01-01 ENCOUNTER — RX ONLY (OUTPATIENT)
Age: 73
Setting detail: RX ONLY
End: 2023-01-01

## 2023-01-01 ENCOUNTER — APPOINTMENT (RX ONLY)
Dept: URBAN - METROPOLITAN AREA CLINIC 6 | Facility: CLINIC | Age: 73
Setting detail: DERMATOLOGY
End: 2023-01-01

## 2023-01-01 DIAGNOSIS — L12.0 BULLOUS PEMPHIGOID: ICD-10-CM

## 2023-01-01 PROCEDURE — ? PRESCRIPTION MEDICATION MANAGEMENT

## 2023-01-01 PROCEDURE — ? DIAGNOSIS COMMENT

## 2023-01-01 PROCEDURE — ? COUNSELING

## 2023-01-01 PROCEDURE — 99214 OFFICE O/P EST MOD 30 MIN: CPT

## 2023-01-01 PROCEDURE — ? ADDITIONAL NOTES

## 2023-01-01 PROCEDURE — ? PRESCRIPTION

## 2023-01-01 RX ORDER — DAPSONE 100 MG/1
TABLET ORAL
Qty: 30 | Refills: 2 | Status: ERX

## 2023-01-01 RX ORDER — KETOCONAZOLE 20 MG/ML
SHAMPOO, SUSPENSION TOPICAL
Qty: 120 | Refills: 3 | Status: ERX | COMMUNITY
Start: 2023-01-01

## 2023-01-01 RX ORDER — PREDNISONE 20 MG/1
TABLET ORAL
Qty: 30 | Refills: 1 | Status: ERX

## 2023-01-01 RX ORDER — PREDNISONE 20 MG/1
TABLET ORAL
Qty: 60 | Refills: 1 | Status: ERX

## 2023-01-01 RX ORDER — PREDNISONE 20 MG/1
TABLET ORAL
Qty: 60 | Refills: 0 | Status: ERX

## 2023-01-01 RX ORDER — CLOBETASOL PROPIONATE 0.5 MG/G
OINTMENT TOPICAL
Qty: 60 | Refills: 3 | Status: ERX

## 2023-01-01 RX ORDER — PREDNISONE 20 MG/1
TABLET ORAL
Qty: 30 | Refills: 0 | Status: ERX

## 2023-01-01 RX ORDER — MYCOPHENOLATE MOFETIL 500 MG/1
TABLET ORAL
Qty: 120 | Refills: 2 | Status: ERX

## 2023-01-01 RX ORDER — DAPSONE 100 MG/1
TABLET ORAL
Qty: 30 | Refills: 3 | Status: ERX

## 2023-01-01 RX ORDER — DAPSONE 100 MG/1
TABLET ORAL
Qty: 30 | Refills: 2 | Status: CANCELLED

## 2023-01-01 RX ORDER — PREDNISONE 10 MG/1
TABLET ORAL
Qty: 15 | Refills: 0 | Status: ERX | COMMUNITY
Start: 2023-01-01

## 2023-01-01 RX ADMIN — KETOCONAZOLE: 20 SHAMPOO, SUSPENSION TOPICAL at 00:00

## 2023-01-01 ASSESSMENT — LOCATION SIMPLE DESCRIPTION DERM
LOCATION SIMPLE: ABDOMEN
LOCATION SIMPLE: UPPER BACK
LOCATION SIMPLE: RIGHT UPPER ARM
LOCATION SIMPLE: LEFT UPPER ARM

## 2023-01-01 ASSESSMENT — LOCATION ZONE DERM
LOCATION ZONE: ARM
LOCATION ZONE: TRUNK

## 2023-01-05 ENCOUNTER — APPOINTMENT (RX ONLY)
Dept: URBAN - METROPOLITAN AREA CLINIC 6 | Facility: CLINIC | Age: 73
Setting detail: DERMATOLOGY
End: 2023-01-05

## 2023-01-05 DIAGNOSIS — L12.0 BULLOUS PEMPHIGOID: ICD-10-CM

## 2023-01-05 PROCEDURE — ? PRESCRIPTION

## 2023-01-05 RX ORDER — PREDNISONE 20 MG/1
TABLET ORAL
Qty: 60 | Refills: 0 | Status: ERX

## 2023-01-18 ENCOUNTER — APPOINTMENT (RX ONLY)
Dept: URBAN - METROPOLITAN AREA CLINIC 6 | Facility: CLINIC | Age: 73
Setting detail: DERMATOLOGY
End: 2023-01-18

## 2023-01-18 DIAGNOSIS — L12.0 BULLOUS PEMPHIGOID: ICD-10-CM

## 2023-01-18 PROCEDURE — ? ORDER TESTS

## 2023-01-18 RX ORDER — CLOBETASOL PROPIONATE 0.5 MG/G
OINTMENT TOPICAL
Qty: 60 | Refills: 3 | Status: ERX

## 2023-01-18 NOTE — PROCEDURE: ORDER TESTS
Bill For Surgical Tray: no
Clinical Notes (To The Lab): Repeat CBC and CMP in one month.
Expected Date Of Service: 01/18/2023
Billing Type: Third-Party Bill
Performing Laboratory: 0

## 2023-02-23 ENCOUNTER — APPOINTMENT (RX ONLY)
Dept: URBAN - METROPOLITAN AREA CLINIC 6 | Facility: CLINIC | Age: 73
Setting detail: DERMATOLOGY
End: 2023-02-23

## 2023-02-23 DIAGNOSIS — L57.0 ACTINIC KERATOSIS: ICD-10-CM

## 2023-02-23 DIAGNOSIS — L12.0 BULLOUS PEMPHIGOID: ICD-10-CM

## 2023-02-23 PROCEDURE — ? PRESCRIPTION

## 2023-02-23 PROCEDURE — ? LIQUID NITROGEN

## 2023-02-23 PROCEDURE — 99214 OFFICE O/P EST MOD 30 MIN: CPT | Mod: 25

## 2023-02-23 PROCEDURE — ? DIAGNOSIS COMMENT

## 2023-02-23 PROCEDURE — 17003 DESTRUCT PREMALG LES 2-14: CPT

## 2023-02-23 PROCEDURE — ? PRESCRIPTION MEDICATION MANAGEMENT

## 2023-02-23 PROCEDURE — ? COUNSELING

## 2023-02-23 PROCEDURE — ? MEDICATION COUNSELING

## 2023-02-23 PROCEDURE — ? ORDER TESTS

## 2023-02-23 PROCEDURE — 17000 DESTRUCT PREMALG LESION: CPT

## 2023-02-23 RX ORDER — DOXYCYCLINE 100 MG/1
CAPSULE ORAL
Qty: 60 | Refills: 2 | Status: ERX | COMMUNITY
Start: 2023-02-23

## 2023-02-23 RX ADMIN — DOXYCYCLINE: 100 CAPSULE ORAL at 00:00

## 2023-02-23 ASSESSMENT — LOCATION SIMPLE DESCRIPTION DERM
LOCATION SIMPLE: UPPER BACK
LOCATION SIMPLE: ABDOMEN
LOCATION SIMPLE: RIGHT UPPER ARM
LOCATION SIMPLE: RIGHT CHEEK
LOCATION SIMPLE: RIGHT EAR
LOCATION SIMPLE: LEFT UPPER ARM
LOCATION SIMPLE: LEFT ZYGOMA
LOCATION SIMPLE: LEFT EAR
LOCATION SIMPLE: LEFT FOREHEAD

## 2023-02-23 ASSESSMENT — BSA RASH: BSA RASH: 8

## 2023-02-23 ASSESSMENT — LOCATION DETAILED DESCRIPTION DERM
LOCATION DETAILED: RIGHT DISTAL POSTERIOR UPPER ARM
LOCATION DETAILED: EPIGASTRIC SKIN
LOCATION DETAILED: LEFT TRIANGULAR FOSSA
LOCATION DETAILED: LEFT INFERIOR FOREHEAD
LOCATION DETAILED: LEFT MEDIAL ZYGOMA
LOCATION DETAILED: LEFT DISTAL POSTERIOR UPPER ARM
LOCATION DETAILED: INFERIOR THORACIC SPINE
LOCATION DETAILED: RIGHT SUPERIOR CENTRAL MALAR CHEEK
LOCATION DETAILED: LEFT SUPERIOR HELIX
LOCATION DETAILED: RIGHT SUPERIOR HELIX

## 2023-02-23 ASSESSMENT — LOCATION ZONE DERM
LOCATION ZONE: ARM
LOCATION ZONE: EAR
LOCATION ZONE: FACE
LOCATION ZONE: TRUNK

## 2023-02-23 NOTE — PROCEDURE: PRESCRIPTION MEDICATION MANAGEMENT
Initiate Treatment: Doxycycline 100mg BID and niacinamide 500mg BID (will purchase OTC).
Continue Regimen: Dapsone 100mg QD and Clobetasol 0.05% ointment QD. Vitamin D and calcium supplementation, Pepcid QD.
Modify Regimen: Prednisone 20mg QD (decrease from 40mg).
Plan: Patient instructed to contact office if lack of improvement in 3-4 weeks, will start therapy with MMF. Provided labs order slips for CBC, CMP in anticipation of starting MMF (baseline, 1wk after starting before sims increase). \\nWill consider MTX, azathioprine or Dupixent if lack of improvement.
Detail Level: Zone
Render In Strict Bullet Format?: No

## 2023-02-23 NOTE — PROCEDURE: MEDICATION COUNSELING
Doxycycline Pregnancy And Lactation Text: This medication is Pregnancy Category D and not consider safe during pregnancy. It is also excreted in breast milk but is considered safe for shorter treatment courses.
Benzoyl Peroxide Pregnancy And Lactation Text: This medication is Pregnancy Category C. It is unknown if benzoyl peroxide is excreted in breast milk.
Arava Pregnancy And Lactation Text: This medication is Pregnancy Category X and is absolutely contraindicated during pregnancy. It is unknown if it is excreted in breast milk.
Azithromycin Counseling:  I discussed with the patient the risks of azithromycin including but not limited to GI upset, allergic reaction, drug rash, diarrhea, and yeast infections.
Taltz Counseling: I discussed with the patient the risks of ixekizumab including but not limited to immunosuppression, serious infections, worsening of inflammatory bowel disease and drug reactions.  The patient understands that monitoring is required including a PPD at baseline and must alert us or the primary physician if symptoms of infection or other concerning signs are noted.
Propranolol Pregnancy And Lactation Text: This medication is Pregnancy Category C and it isn't known if it is safe during pregnancy. It is excreted in breast milk.
Glycopyrrolate Counseling:  I discussed with the patient the risks of glycopyrrolate including but not limited to skin rash, drowsiness, dry mouth, difficulty urinating, and blurred vision.
Drysol Counseling:  I discussed with the patient the risks of drysol/aluminum chloride including but not limited to skin rash, itching, irritation, burning.
Cyclosporine Counseling:  I discussed with the patient the risks of cyclosporine including but not limited to hypertension, gingival hyperplasia,myelosuppression, immunosuppression, liver damage, kidney damage, neurotoxicity, lymphoma, and serious infections. The patient understands that monitoring is required including baseline blood pressure, CBC, CMP, lipid panel and uric acid, and then 1-2 times monthly CMP and blood pressure.
Griseofulvin Pregnancy And Lactation Text: This medication is Pregnancy Category X and is known to cause serious birth defects. It is unknown if this medication is excreted in breast milk but breast feeding should be avoided.
Topical Ketoconazole Pregnancy And Lactation Text: This medication is Pregnancy Category B and is considered safe during pregnancy. It is unknown if it is excreted in breast milk.
Picato Pregnancy And Lactation Text: This medication is Pregnancy Category C. It is unknown if this medication is excreted in breast milk.
Doxepin Counseling:  Patient advised that the medication is sedating and not to drive a car after taking this medication. Patient informed of potential adverse effects including but not limited to dry mouth, urinary retention, and blurry vision.  The patient verbalized understanding of the proper use and possible adverse effects of doxepin.  All of the patient's questions and concerns were addressed.
Dupixent Pregnancy And Lactation Text: This medication likely crosses the placenta but the risk for the fetus is uncertain. This medication is excreted in breast milk.
Isotretinoin Counseling: Patient should get monthly blood tests, not donate blood, not drive at night if vision affected, not share medication, and not undergo elective surgery for 6 months after tx completed. Side effects reviewed, pt to contact office should one occur.
Cyclosporine Pregnancy And Lactation Text: This medication is Pregnancy Category C and it isn't know if it is safe during pregnancy. This medication is excreted in breast milk.
Sotyktu Counseling:  I discussed the most common side effects of Sotyktu including: common cold, sore throat, sinus infections, cold sores, canker sores, folliculitis, and acne.  I also discussed more serious side effects of Sotyktu including but not limited to: serious allergic reactions; increased risk for infections such as TB; cancers such as lymphomas; rhabdomyolysis and elevated CPK; and elevated triglycerides and liver enzymes. 
Rifampin Pregnancy And Lactation Text: This medication is Pregnancy Category C and it isn't know if it is safe during pregnancy. It is also excreted in breast milk and should not be used if you are breast feeding.
Olanzapine Counseling- I discussed with the patient the common side effects of olanzapine including but are not limited to: lack of energy, dry mouth, increased appetite, sleepiness, tremor, constipation, dizziness, changes in behavior, or restlessness.  Explained that teenagers are more likely to experience headaches, abdominal pain, pain in the arms or legs, tiredness, and sleepiness.  Serious side effects include but are not limited: increased risk of death in elderly patients who are confused, have memory loss, or dementia-related psychosis; hyperglycemia; increased cholesterol and triglycerides; and weight gain.
Spironolactone Counseling: Patient advised regarding risks of diarrhea, abdominal pain, hyperkalemia, birth defects (for female patients), liver toxicity and renal toxicity. The patient may need blood work to monitor liver and kidney function and potassium levels while on therapy. The patient verbalized understanding of the proper use and possible adverse effects of spironolactone.  All of the patient's questions and concerns were addressed.
Ivermectin Pregnancy And Lactation Text: This medication is Pregnancy Category C and it isn't known if it is safe during pregnancy. It is also excreted in breast milk.
Drysol Pregnancy And Lactation Text: This medication is considered safe during pregnancy and breast feeding.
Enbrel Counseling:  I discussed with the patient the risks of etanercept including but not limited to myelosuppression, immunosuppression, autoimmune hepatitis, demyelinating diseases, lymphoma, and infections.  The patient understands that monitoring is required including a PPD at baseline and must alert us or the primary physician if symptoms of infection or other concerning signs are noted.
Topical Steroids Counseling: I discussed with the patient that prolonged use of topical steroids can result in the increased appearance of superficial blood vessels (telangiectasias), lightening (hypopigmentation) and thinning of the skin (atrophy).  Patient understands to avoid using high potency steroids in skin folds, the groin or the face.  The patient verbalized understanding of the proper use and possible adverse effects of topical steroids.  All of the patient's questions and concerns were addressed.
Vtama Pregnancy And Lactation Text: It is unknown if this medication can cause problems during pregnancy and breastfeeding.
Libtayo Pregnancy And Lactation Text: This medication is contraindicated in pregnancy and when breast feeding.
Klisyri Counseling:  I discussed with the patient the risks of Klisyri including but not limited to erythema, scaling, itching, weeping, crusting, and pain.
Rituxan Pregnancy And Lactation Text: This medication is Pregnancy Category C and it isn't know if it is safe during pregnancy. It is unknown if this medication is excreted in breast milk but similar antibodies are known to be excreted.
Methotrexate Pregnancy And Lactation Text: This medication is Pregnancy Category X and is known to cause fetal harm. This medication is excreted in breast milk.
Odomzo Counseling- I discussed with the patient the risks of Odomzo including but not limited to nausea, vomiting, diarrhea, constipation, weight loss, changes in the sense of taste, decreased appetite, muscle spasms, and hair loss.  The patient verbalized understanding of the proper use and possible adverse effects of Odomzo.  All of the patient's questions and concerns were addressed.
Protopic Counseling: Patient may experience a mild burning sensation during topical application. Protopic is not approved in children less than 2 years of age. There have been case reports of hematologic and skin malignancies in patients using topical calcineurin inhibitors although causality is questionable.
Taltz Pregnancy And Lactation Text: The risk during pregnancy and breastfeeding is uncertain with this medication.
SSKI Counseling:  I discussed with the patient the risks of SSKI including but not limited to thyroid abnormalities, metallic taste, GI upset, fever, headache, acne, arthralgias, paraesthesias, lymphadenopathy, easy bleeding, arrhythmias, and allergic reaction.
Clofazimine Counseling:  I discussed with the patient the risks of clofazimine including but not limited to skin and eye pigmentation, liver damage, nausea/vomiting, gastrointestinal bleeding and allergy.
Solaraze Pregnancy And Lactation Text: This medication is Pregnancy Category B and is considered safe. There is some data to suggest avoiding during the third trimester. It is unknown if this medication is excreted in breast milk.
Klisyri Pregnancy And Lactation Text: It is unknown if this medication can harm a developing fetus or if it is excreted in breast milk.
Azithromycin Pregnancy And Lactation Text: This medication is considered safe during pregnancy and is also secreted in breast milk.
Isotretinoin Pregnancy And Lactation Text: This medication is Pregnancy Category X and is considered extremely dangerous during pregnancy. It is unknown if it is excreted in breast milk.
Itraconazole Counseling:  I discussed with the patient the risks of itraconazole including but not limited to liver damage, nausea/vomiting, neuropathy, and severe allergy.  The patient understands that this medication is best absorbed when taken with acidic beverages such as non-diet cola or ginger ale.  The patient understands that monitoring is required including baseline LFTs and repeat LFTs at intervals.  The patient understands that they are to contact us or the primary physician if concerning signs are noted.
Carac Counseling:  I discussed with the patient the risks of Carac including but not limited to erythema, scaling, itching, weeping, crusting, and pain.
Erythromycin Counseling:  I discussed with the patient the risks of erythromycin including but not limited to GI upset, allergic reaction, drug rash, diarrhea, increase in liver enzymes, and yeast infections.
Glycopyrrolate Pregnancy And Lactation Text: This medication is Pregnancy Category B and is considered safe during pregnancy. It is unknown if it is excreted breast milk.
Topical Steroids Applications Pregnancy And Lactation Text: Most topical steroids are considered safe to use during pregnancy and lactation.  Any topical steroid applied to the breast or nipple should be washed off before breastfeeding.
Elidel Counseling: Patient may experience a mild burning sensation during topical application. Elidel is not approved in children less than 2 years of age. There have been case reports of hematologic and skin malignancies in patients using topical calcineurin inhibitors although causality is questionable.
Sarecycline Counseling: Patient advised regarding possible photosensitivity and discoloration of the teeth, skin, lips, tongue and gums.  Patient instructed to avoid sunlight, if possible.  When exposed to sunlight, patients should wear protective clothing, sunglasses, and sunscreen.  The patient was instructed to call the office immediately if the following severe adverse effects occur:  hearing changes, easy bruising/bleeding, severe headache, or vision changes.  The patient verbalized understanding of the proper use and possible adverse effects of sarecycline.  All of the patient's questions and concerns were addressed.
Itraconazole Pregnancy And Lactation Text: This medication is Pregnancy Category C and it isn't know if it is safe during pregnancy. It is also excreted in breast milk.
Enbrel Pregnancy And Lactation Text: This medication is Pregnancy Category B and is considered safe during pregnancy. It is unknown if this medication is excreted in breast milk.
High Dose Vitamin A Counseling: Side effects reviewed, pt to contact office should one occur.
Hydroxychloroquine Counseling:  I discussed with the patient that a baseline ophthalmologic exam is needed at the start of therapy and every year thereafter while on therapy. A CBC may also be warranted for monitoring.  The side effects of this medication were discussed with the patient, including but not limited to agranulocytosis, aplastic anemia, seizures, rashes, retinopathy, and liver toxicity. Patient instructed to call the office should any adverse effect occur.  The patient verbalized understanding of the proper use and possible adverse effects of Plaquenil.  All the patient's questions and concerns were addressed.
Erythromycin Pregnancy And Lactation Text: This medication is Pregnancy Category B and is considered safe during pregnancy. It is also excreted in breast milk.
Carac Pregnancy And Lactation Text: This medication is Pregnancy Category X and contraindicated in pregnancy and in women who may become pregnant. It is unknown if this medication is excreted in breast milk.
Spironolactone Pregnancy And Lactation Text: This medication can cause feminization of the male fetus and should be avoided during pregnancy. The active metabolite is also found in breast milk.
Prednisone Counseling:  I discussed with the patient the risks of prolonged use of prednisone including but not limited to weight gain, insomnia, osteoporosis, mood changes, diabetes, susceptibility to infection, glaucoma and high blood pressure.  In cases where prednisone use is prolonged, patients should be monitored with blood pressure checks, serum glucose levels and an eye exam.  Additionally, the patient may need to be placed on GI prophylaxis, PCP prophylaxis, and calcium and vitamin D supplementation and/or a bisphosphonate.  The patient verbalized understanding of the proper use and the possible adverse effects of prednisone.  All of the patient's questions and concerns were addressed.
Olanzapine Pregnancy And Lactation Text: This medication is pregnancy category C.   There are no adequate and well controlled trials with olanzapine in pregnant females.  Olanzapine should be used during pregnancy only if the potential benefit justifies the potential risk to the fetus.   In a study in lactating healthy women, olanzapine was excreted in breast milk.  It is recommended that women taking olanzapine should not breast feed.
Sotyktu Pregnancy And Lactation Text: There is insufficient data to evaluate whether or not Sotyktu is safe to use during pregnancy.   It is not known if Sotyktu passes into breast milk and whether or not it is safe to use when breastfeeding.  
Zoryve Counseling:  I discussed with the patient that Zoryve is not for use in the eyes, mouth or vagina. The most commonly reported side effects include diarrhea, headache, insomnia, application site pain, upper respiratory tract infections, and urinary tract infections.  All of the patient's questions and concerns were addressed.
Siliq Counseling:  I discussed with the patient the risks of Siliq including but not limited to new or worsening depression, suicidal thoughts and behavior, immunosuppression, malignancy, posterior leukoencephalopathy syndrome, and serious infections.  The patient understands that monitoring is required including a PPD at baseline and must alert us or the primary physician if symptoms of infection or other concerning signs are noted. There is also a special program designed to monitor depression which is required with Siliq.
Bactrim Counseling:  I discussed with the patient the risks of sulfa antibiotics including but not limited to GI upset, allergic reaction, drug rash, diarrhea, dizziness, photosensitivity, and yeast infections.  Rarely, more serious reactions can occur including but not limited to aplastic anemia, agranulocytosis, methemoglobinemia, blood dyscrasias, liver or kidney failure, lung infiltrates or desquamative/blistering drug rashes.
Sski Pregnancy And Lactation Text: This medication is Pregnancy Category D and isn't considered safe during pregnancy. It is excreted in breast milk.
Xeljanz Counseling: I discussed with the patient the risks of Xeljanz therapy including increased risk of infection, liver issues, headache, diarrhea, or cold symptoms. Live vaccines should be avoided. They were instructed to call if they have any problems.
Oral Minoxidil Counseling- I discussed with the patient the risks of oral minoxidil including but not limited to shortness of breath, swelling of the feet or ankles, dizziness, lightheadedness, unwanted hair growth and allergic reaction.  The patient verbalized understanding of the proper use and possible adverse effects of oral minoxidil.  All of the patient's questions and concerns were addressed.
Clofazimine Pregnancy And Lactation Text: This medication is Pregnancy Category C and isn't considered safe during pregnancy. It is excreted in breast milk.
Protopic Pregnancy And Lactation Text: This medication is Pregnancy Category C. It is unknown if this medication is excreted in breast milk when applied topically.
Adbry Counseling: I discussed with the patient the risks of tralokinumab including but not limited to eye infection and irritation, cold sores, injection site reactions, worsening of asthma, allergic reactions and increased risk of parasitic infection.  Live vaccines should be avoided while taking tralokinumab. The patient understands that monitoring is required and they must alert us or the primary physician if symptoms of infection or other concerning signs are noted.
Topical Retinoid counseling:  Patient advised to apply a pea-sized amount only at bedtime and wait 30 minutes after washing their face before applying.  If too drying, patient may add a non-comedogenic moisturizer. The patient verbalized understanding of the proper use and possible adverse effects of retinoids.  All of the patient's questions and concerns were addressed.
Tremfya Counseling: I discussed with the patient the risks of guselkumab including but not limited to immunosuppression, serious infections, and drug reactions.  The patient understands that monitoring is required including a PPD at baseline and must alert us or the primary physician if symptoms of infection or other concerning signs are noted.
Minoxidil Counseling: Minoxidil is a topical medication which can increase blood flow where it is applied. It is uncertain how this medication increases hair growth. Side effects are uncommon and include stinging and allergic reactions.
Azathioprine Counseling:  I discussed with the patient the risks of azathioprine including but not limited to myelosuppression, immunosuppression, hepatotoxicity, lymphoma, and infections.  The patient understands that monitoring is required including baseline LFTs, Creatinine, possible TPMP genotyping and weekly CBCs for the first month and then every 2 weeks thereafter.  The patient verbalized understanding of the proper use and possible adverse effects of azathioprine.  All of the patient's questions and concerns were addressed.
Metronidazole Counseling:  I discussed with the patient the risks of metronidazole including but not limited to seizures, nausea/vomiting, a metallic taste in the mouth, nausea/vomiting and severe allergy.
Hydroxychloroquine Pregnancy And Lactation Text: This medication has been shown to cause fetal harm but it isn't assigned a Pregnancy Risk Category. There are small amounts excreted in breast milk.
Qbrexza Counseling:  I discussed with the patient the risks of Qbrexza including but not limited to headache, mydriasis, blurred vision, dry eyes, nasal dryness, dry mouth, dry throat, dry skin, urinary hesitation, and constipation.  Local skin reactions including erythema, burning, stinging, and itching can also occur.
Adbry Pregnancy And Lactation Text: It is unknown if this medication will adversely affect pregnancy or breast feeding.
Calcipotriene Counseling:  I discussed with the patient the risks of calcipotriene including but not limited to erythema, scaling, itching, and irritation.
Topical Sulfur Applications Counseling: Topical Sulfur Counseling: Patient counseled that this medication may cause skin irritation or allergic reactions.  In the event of skin irritation, the patient was advised to reduce the amount of the drug applied or use it less frequently.   The patient verbalized understanding of the proper use and possible adverse effects of topical sulfur application.  All of the patient's questions and concerns were addressed.
Sarecycline Pregnancy And Lactation Text: This medication is Pregnancy Category D and not consider safe during pregnancy. It is also excreted in breast milk.
Ketoconazole Counseling:   Patient counseled regarding improving absorption with orange juice.  Adverse effects include but are not limited to breast enlargement, headache, diarrhea, nausea, upset stomach, liver function test abnormalities, taste disturbance, and stomach pain.  There is a rare possibility of liver failure that can occur when taking ketoconazole. The patient understands that monitoring of LFTs may be required, especially at baseline. The patient verbalized understanding of the proper use and possible adverse effects of ketoconazole.  All of the patient's questions and concerns were addressed.
High Dose Vitamin A Pregnancy And Lactation Text: High dose vitamin A therapy is contraindicated during pregnancy and breast feeding.
Humira Counseling:  I discussed with the patient the risks of adalimumab including but not limited to myelosuppression, immunosuppression, autoimmune hepatitis, demyelinating diseases, lymphoma, and serious infections.  The patient understands that monitoring is required including a PPD at baseline and must alert us or the primary physician if symptoms of infection or other concerning signs are noted.
Minoxidil Pregnancy And Lactation Text: This medication has not been assigned a Pregnancy Risk Category but animal studies failed to show danger with the topical medication. It is unknown if the medication is excreted in breast milk.
Oral Minoxidil Pregnancy And Lactation Text: This medication should only be used when clearly needed if you are pregnant, attempting to become pregnant or breast feeding.
Doxepin Pregnancy And Lactation Text: This medication is Pregnancy Category C and it isn't known if it is safe during pregnancy. It is also excreted in breast milk and breast feeding isn't recommended.
Zyclara Counseling:  I discussed with the patient the risks of imiquimod including but not limited to erythema, scaling, itching, weeping, crusting, and pain.  Patient understands that the inflammatory response to imiquimod is variable from person to person and was educated regarded proper titration schedule.  If flu-like symptoms develop, patient knows to discontinue the medication and contact us.
Simponi Counseling:  I discussed with the patient the risks of golimumab including but not limited to myelosuppression, immunosuppression, autoimmune hepatitis, demyelinating diseases, lymphoma, and serious infections.  The patient understands that monitoring is required including a PPD at baseline and must alert us or the primary physician if symptoms of infection or other concerning signs are noted.
Eucrisa Counseling: Patient may experience a mild burning sensation during topical application. Eucrisa is not approved in children less than 2 years of age.
Tetracycline Counseling: Patient counseled regarding possible photosensitivity and increased risk for sunburn.  Patient instructed to avoid sunlight, if possible.  When exposed to sunlight, patients should wear protective clothing, sunglasses, and sunscreen.  The patient was instructed to call the office immediately if the following severe adverse effects occur:  hearing changes, easy bruising/bleeding, severe headache, or vision changes.  The patient verbalized understanding of the proper use and possible adverse effects of tetracycline.  All of the patient's questions and concerns were addressed. Patient understands to avoid pregnancy while on therapy due to potential birth defects.
Xeljyothiz Pregnancy And Lactation Text: This medication is Pregnancy Category D and is not considered safe during pregnancy.  The risk during breast feeding is also uncertain.
Ketoconazole Pregnancy And Lactation Text: This medication is Pregnancy Category C and it isn't know if it is safe during pregnancy. It is also excreted in breast milk and breast feeding isn't recommended.
Thalidomide Counseling: I discussed with the patient the risks of thalidomide including but not limited to birth defects, anxiety, weakness, chest pain, dizziness, cough and severe allergy.
Bactrim Pregnancy And Lactation Text: This medication is Pregnancy Category D and is known to cause fetal risk.  It is also excreted in breast milk.
Colchicine Counseling:  Patient counseled regarding adverse effects including but not limited to stomach upset (nausea, vomiting, stomach pain, or diarrhea).  Patient instructed to limit alcohol consumption while taking this medication.  Colchicine may reduce blood counts especially with prolonged use.  The patient understands that monitoring of kidney function and blood counts may be required, especially at baseline. The patient verbalized understanding of the proper use and possible adverse effects of colchicine.  All of the patient's questions and concerns were addressed.
Metronidazole Pregnancy And Lactation Text: This medication is Pregnancy Category B and considered safe during pregnancy.  It is also excreted in breast milk.
Calcipotriene Pregnancy And Lactation Text: This medication has not been proven safe during pregnancy. It is unknown if this medication is excreted in breast milk.
Tazorac Counseling:  Patient advised that medication is irritating and drying.  Patient may need to apply sparingly and wash off after an hour before eventually leaving it on overnight.  The patient verbalized understanding of the proper use and possible adverse effects of tazorac.  All of the patient's questions and concerns were addressed.
Xolair Counseling:  Patient informed of potential adverse effects including but not limited to fever, muscle aches, rash and allergic reactions.  The patient verbalized understanding of the proper use and possible adverse effects of Xolair.  All of the patient's questions and concerns were addressed.
Cimzia Counseling:  I discussed with the patient the risks of Cimzia including but not limited to immunosuppression, allergic reactions and infections.  The patient understands that monitoring is required including a PPD at baseline and must alert us or the primary physician if symptoms of infection or other concerning signs are noted.
Aklief counseling:  Patient advised to apply a pea-sized amount only at bedtime and wait 30 minutes after washing their face before applying.  If too drying, patient may add a non-comedogenic moisturizer.  The most commonly reported side effects including irritation, redness, scaling, dryness, stinging, burning, itching, and increased risk of sunburn.  The patient verbalized understanding of the proper use and possible adverse effects of retinoids.  All of the patient's questions and concerns were addressed.
Cephalexin Counseling: I counseled the patient regarding use of cephalexin as an antibiotic for prophylactic and/or therapeutic purposes. Cephalexin (commonly prescribed under brand name Keflex) is a cephalosporin antibiotic which is active against numerous classes of bacteria, including most skin bacteria. Side effects may include nausea, diarrhea, gastrointestinal upset, rash, hives, yeast infections, and in rare cases, hepatitis, kidney disease, seizures, fever, confusion, neurologic symptoms, and others. Patients with severe allergies to penicillin medications are cautioned that there is about a 10% incidence of cross-reactivity with cephalosporins. When possible, patients with penicillin allergies should use alternatives to cephalosporins for antibiotic therapy.
Low Dose Naltrexone Counseling- I discussed with the patient the potential risks and side effects of low dose naltrexone including but not limited to: more vivid dreams, headaches, nausea, vomiting, abdominal pain, fatigue, dizziness, and anxiety.
Cibinqo Counseling: I discussed with the patient the risks of Cibinqo therapy including but not limited to common cold, nausea, headache, cold sores, increased blood CPK levels, dizziness, UTIs, fatigue, acne, and vomitting. Live vaccines should be avoided.  This medication has been linked to serious infections; higher rate of mortality; malignancy and lymphoproliferative disorders; major adverse cardiovascular events; thrombosis; thrombocytopenia and lymphopenia; lipid elevations; and retinal detachment.
Azathioprine Pregnancy And Lactation Text: This medication is Pregnancy Category D and isn't considered safe during pregnancy. It is unknown if this medication is excreted in breast milk.
Qbrexza Pregnancy And Lactation Text: There is no available data on Qbrexza use in pregnant women.  There is no available data on Qbrexza use in lactation.
Topical Sulfur Applications Pregnancy And Lactation Text: This medication is Pregnancy Category C and has an unknown safety profile during pregnancy. It is unknown if this topical medication is excreted in breast milk.
Dutasteride Male Counseling: Dustasteride Counseling:  I discussed with the patient the risks of use of dutasteride including but not limited to decreased libido, decreased ejaculate volume, and gynecomastia. Women who can become pregnant should not handle medication.  All of the patient's questions and concerns were addressed.
Otezla Counseling: The side effects of Otezla were discussed with the patient, including but not limited to worsening or new depression, weight loss, diarrhea, nausea, upper respiratory tract infection, and headache. Patient instructed to call the office should any adverse effect occur.  The patient verbalized understanding of the proper use and possible adverse effects of Otezla.  All the patient's questions and concerns were addressed.
Dutasteride Pregnancy And Lactation Text: This medication is absolutely contraindicated in women, especially during pregnancy and breast feeding. Feminization of male fetuses is possible if taking while pregnant.
Low Dose Naltrexone Pregnancy And Lactation Text: Naltrexone is pregnancy category C.  There have been no adequate and well-controlled studies in pregnant women.  It should be used in pregnancy only if the potential benefit justifies the potential risk to the fetus.   Limited data indicates that naltrexone is minimally excreted into breastmilk.
Wartpeel Counseling:  I discussed with the patient the risks of Wartpeel including but not limited to erythema, scaling, itching, weeping, crusting, and pain.
Ilumya Counseling: I discussed with the patient the risks of tildrakizumab including but not limited to immunosuppression, malignancy, posterior leukoencephalopathy syndrome, and serious infections.  The patient understands that monitoring is required including a PPD at baseline and must alert us or the primary physician if symptoms of infection or other concerning signs are noted.
Cibinqo Pregnancy And Lactation Text: It is unknown if this medication will adversely affect pregnancy or breast feeding.  You should not take this medication if you are currently pregnant or planning a pregnancy or while breastfeeding.
Mirvaso Counseling: Mirvaso is a topical medication which can decrease superficial blood flow where applied. Side effects are uncommon and include stinging, redness and allergic reactions.
Hydroxyzine Counseling: Patient advised that the medication is sedating and not to drive a car after taking this medication.  Patient informed of potential adverse effects including but not limited to dry mouth, urinary retention, and blurry vision.  The patient verbalized understanding of the proper use and possible adverse effects of hydroxyzine.  All of the patient's questions and concerns were addressed.
Cephalexin Pregnancy And Lactation Text: This medication is Pregnancy Category B and considered safe during pregnancy.  It is also excreted in breast milk but can be used safely for shorter doses.
Aklief Pregnancy And Lactation Text: It is unknown if this medication is safe to use during pregnancy.  It is unknown if this medication is excreted in breast milk.  Breastfeeding women should use the topical cream on the smallest area of the skin for the shortest time needed while breastfeeding.  Do not apply to nipple and areola.
Xolair Pregnancy And Lactation Text: This medication is Pregnancy Category B and is considered safe during pregnancy. This medication is excreted in breast milk.
Tranexamic Acid Counseling:  Patient advised of the small risk of bleeding problems with tranexamic acid. They were also instructed to call if they developed any nausea, vomiting or diarrhea. All of the patient's questions and concerns were addressed.
Dapsone Counseling: I discussed with the patient the risks of dapsone including but not limited to hemolytic anemia, agranulocytosis, rashes, methemoglobinemia, kidney failure, peripheral neuropathy, headaches, GI upset, and liver toxicity.  Patients who start dapsone require monitoring including baseline LFTs and weekly CBCs for the first month, then every month thereafter.  The patient verbalized understanding of the proper use and possible adverse effects of dapsone.  All of the patient's questions and concerns were addressed.
Mirvaso Pregnancy And Lactation Text: This medication has not been assigned a Pregnancy Risk Category. It is unknown if the medication is excreted in breast milk.
Skyrizi Counseling: I discussed with the patient the risks of risankizumab-rzaa including but not limited to immunosuppression, and serious infections.  The patient understands that monitoring is required including a PPD at baseline and must alert us or the primary physician if symptoms of infection or other concerning signs are noted.
Hydroxyzine Pregnancy And Lactation Text: This medication is not safe during pregnancy and should not be taken. It is also excreted in breast milk and breast feeding isn't recommended.
Detail Level: Zone
Terbinafine Counseling: Patient counseling regarding adverse effects of terbinafine including but not limited to headache, diarrhea, rash, upset stomach, liver function test abnormalities, itching, taste/smell disturbance, nausea, abdominal pain, and flatulence.  There is a rare possibility of liver failure that can occur when taking terbinafine.  The patient understands that a baseline LFT and kidney function test may be required. The patient verbalized understanding of the proper use and possible adverse effects of terbinafine.  All of the patient's questions and concerns were addressed.
Minocycline Counseling: Patient advised regarding possible photosensitivity and discoloration of the teeth, skin, lips, tongue and gums.  Patient instructed to avoid sunlight, if possible.  When exposed to sunlight, patients should wear protective clothing, sunglasses, and sunscreen.  The patient was instructed to call the office immediately if the following severe adverse effects occur:  hearing changes, easy bruising/bleeding, severe headache, or vision changes.  The patient verbalized understanding of the proper use and possible adverse effects of minocycline.  All of the patient's questions and concerns were addressed.
Rhofade Counseling: Rhofade is a topical medication which can decrease superficial blood flow where applied. Side effects are uncommon and include stinging, redness and allergic reactions.
Cimzia Pregnancy And Lactation Text: This medication crosses the placenta but can be considered safe in certain situations. Cimzia may be excreted in breast milk.
Cellcept Counseling:  I discussed with the patient the risks of mycophenolate mofetil including but not limited to infection/immunosuppression, GI upset, hypokalemia, hypercholesterolemia, bone marrow suppression, lymphoproliferative disorders, malignancy, GI ulceration/bleed/perforation, colitis, interstitial lung disease, kidney failure, progressive multifocal leukoencephalopathy, and birth defects.  The patient understands that monitoring is required including a baseline creatinine and regular CBC testing. In addition, patient must alert us immediately if symptoms of infection or other concerning signs are noted.
Tazorac Pregnancy And Lactation Text: This medication is not safe during pregnancy. It is unknown if this medication is excreted in breast milk.
Acitretin Counseling:  I discussed with the patient the risks of acitretin including but not limited to hair loss, dry lips/skin/eyes, liver damage, hyperlipidemia, depression/suicidal ideation, photosensitivity.  Serious rare side effects can include but are not limited to pancreatitis, pseudotumor cerebri, bony changes, clot formation/stroke/heart attack.  Patient understands that alcohol is contraindicated since it can result in liver toxicity and significantly prolong the elimination of the drug by many years.
Hydroquinone Counseling:  Patient advised that medication may result in skin irritation, lightening (hypopigmentation), dryness, and burning.  In the event of skin irritation, the patient was advised to reduce the amount of the drug applied or use it less frequently.  Rarely, spots that are treated with hydroquinone can become darker (pseudoochronosis).  Should this occur, patient instructed to stop medication and call the office. The patient verbalized understanding of the proper use and possible adverse effects of hydroquinone.  All of the patient's questions and concerns were addressed.
Opioid Counseling: I discussed with the patient the potential side effects of opioids including but not limited to addiction, altered mental status, and depression. I stressed avoiding alcohol, benzodiazepines, muscle relaxants and sleep aids unless specifically okayed by a physician. The patient verbalized understanding of the proper use and possible adverse effects of opioids. All of the patient's questions and concerns were addressed. They were instructed to flush the remaining pills down the toilet if they did not need them for pain.
Niacinamide Counseling: I recommended taking niacin or niacinamide, also know as vitamin B3, twice daily. Recent evidence suggests that taking vitamin B3 (500 mg twice daily) can reduce the risk of actinic keratoses and non-melanoma skin cancers. Side effects of vitamin B3 include flushing and headache.
Terbinafine Pregnancy And Lactation Text: This medication is Pregnancy Category B and is considered safe during pregnancy. It is also excreted in breast milk and breast feeding isn't recommended.
Finasteride Male Counseling: Finasteride Counseling:  I discussed with the patient the risks of use of finasteride including but not limited to decreased libido, decreased ejaculate volume, gynecomastia, and depression. Women should not handle medication.  All of the patient's questions and concerns were addressed.
Cantharidin Pregnancy And Lactation Text: The use of this medication during pregnancy or lactation is not recommended as there is insufficient data.
Cosentyx Counseling:  I discussed with the patient the risks of Cosentyx including but not limited to worsening of Crohn's disease, immunosuppression, allergic reactions and infections.  The patient understands that monitoring is required including a PPD at baseline and must alert us or the primary physician if symptoms of infection or other concerning signs are noted.
Olumiant Counseling: I discussed with the patient the risks of Olumiant therapy including but not limited to upper respiratory tract infections, shingles, cold sores, and nausea. Live vaccines should be avoided.  This medication has been linked to serious infections; higher rate of mortality; malignancy and lymphoproliferative disorders; major adverse cardiovascular events; thrombosis; gastrointestinal perforations; neutropenia; lymphopenia; anemia; liver enzyme elevations; and lipid elevations.
Topical Clindamycin Counseling: Patient counseled that this medication may cause skin irritation or allergic reactions.  In the event of skin irritation, the patient was advised to reduce the amount of the drug applied or use it less frequently.   The patient verbalized understanding of the proper use and possible adverse effects of clindamycin.  All of the patient's questions and concerns were addressed.
Otezla Pregnancy And Lactation Text: This medication is Pregnancy Category C and it isn't known if it is safe during pregnancy. It is unknown if it is excreted in breast milk.
Azelaic Acid Counseling: Patient counseled that medicine may cause skin irritation and to avoid applying near the eyes.  In the event of skin irritation, the patient was advised to reduce the amount of the drug applied or use it less frequently.   The patient verbalized understanding of the proper use and possible adverse effects of azelaic acid.  All of the patient's questions and concerns were addressed.
Oxybutynin Counseling:  I discussed with the patient the risks of oxybutynin including but not limited to skin rash, drowsiness, dry mouth, difficulty urinating, and blurred vision.
Acitretin Pregnancy And Lactation Text: This medication is Pregnancy Category X and should not be given to women who are pregnant or may become pregnant in the future. This medication is excreted in breast milk.
Fluconazole Counseling:  Patient counseled regarding adverse effects of fluconazole including but not limited to headache, diarrhea, nausea, upset stomach, liver function test abnormalities, taste disturbance, and stomach pain.  There is a rare possibility of liver failure that can occur when taking fluconazole.  The patient understands that monitoring of LFTs and kidney function test may be required, especially at baseline. The patient verbalized understanding of the proper use and possible adverse effects of fluconazole.  All of the patient's questions and concerns were addressed.
Dapsone Pregnancy And Lactation Text: This medication is Pregnancy Category C and is not considered safe during pregnancy or breast feeding.
Include Pregnancy/Lactation Warning?: No
Tranexamic Acid Pregnancy And Lactation Text: It is unknown if this medication is safe during pregnancy or breast feeding.
Clindamycin Counseling: I counseled the patient regarding use of clindamycin as an antibiotic for prophylactic and/or therapeutic purposes. Clindamycin is active against numerous classes of bacteria, including skin bacteria. Side effects may include nausea, diarrhea, gastrointestinal upset, rash, hives, yeast infections, and in rare cases, colitis.
Opzelura Counseling:  I discussed with the patient the risks of Opzelura including but not limited to nasopharngitis, bronchitis, ear infection, eosinophila, hives, diarrhea, folliculitis, tonsillitis, and rhinorrhea.  Taken orally, this medication has been linked to serious infections; higher rate of mortality; malignancy and lymphoproliferative disorders; major adverse cardiovascular events; thrombosis; thrombocytopenia, anemia, and neutropenia; and lipid elevations.
Cyclophosphamide Counseling:  I discussed with the patient the risks of cyclophosphamide including but not limited to hair loss, hormonal abnormalities, decreased fertility, abdominal pain, diarrhea, nausea and vomiting, bone marrow suppression and infection. The patient understands that monitoring is required while taking this medication.
5-Fu Counseling: 5-Fluorouracil Counseling:  I discussed with the patient the risks of 5-fluorouracil including but not limited to erythema, scaling, itching, weeping, crusting, and pain.
Olumiant Pregnancy And Lactation Text: Based on animal studies, Olumiant may cause embryo-fetal harm when administered to pregnant women.  The medication should not be used in pregnancy.  Breastfeeding is not recommended during treatment.
Quinolones Counseling:  I discussed with the patient the risks of fluoroquinolones including but not limited to GI upset, allergic reaction, drug rash, diarrhea, dizziness, photosensitivity, yeast infections, liver function test abnormalities, tendonitis/tendon rupture.
Bexarotene Counseling:  I discussed with the patient the risks of bexarotene including but not limited to hair loss, dry lips/skin/eyes, liver abnormalities, hyperlipidemia, pancreatitis, depression/suicidal ideation, photosensitivity, drug rash/allergic reactions, hypothyroidism, anemia, leukopenia, infection, cataracts, and teratogenicity.  Patient understands that they will need regular blood tests to check lipid profile, liver function tests, white blood cell count, thyroid function tests and pregnancy test if applicable.
Solaraze Counseling:  I discussed with the patient the risks of Solaraze including but not limited to erythema, scaling, itching, weeping, crusting, and pain.
Gabapentin Counseling: I discussed with the patient the risks of gabapentin including but not limited to dizziness, somnolence, fatigue and ataxia.
Opioid Pregnancy And Lactation Text: These medications can lead to premature delivery and should be avoided during pregnancy. These medications are also present in breast milk in small amounts.
Valtrex Counseling: I discussed with the patient the risks of valacyclovir including but not limited to kidney damage, nausea, vomiting and severe allergy.  The patient understands that if the infection seems to be worsening or is not improving, they are to call.
Finasteride Pregnancy And Lactation Text: This medication is absolutely contraindicated during pregnancy. It is unknown if it is excreted in breast milk.
Niacinamide Pregnancy And Lactation Text: These medications are considered safe during pregnancy.
Erivedge Counseling- I discussed with the patient the risks of Erivedge including but not limited to nausea, vomiting, diarrhea, constipation, weight loss, changes in the sense of taste, decreased appetite, muscle spasms, and hair loss.  The patient verbalized understanding of the proper use and possible adverse effects of Erivedge.  All of the patient's questions and concerns were addressed.
Winlevi Counseling:  I discussed with the patient the risks of topical clascoterone including but not limited to erythema, scaling, itching, and stinging. Patient voiced their understanding.
Albendazole Counseling:  I discussed with the patient the risks of albendazole including but not limited to cytopenia, kidney damage, nausea/vomiting and severe allergy.  The patient understands that this medication is being used in an off-label manner.
Infliximab Counseling:  I discussed with the patient the risks of infliximab including but not limited to myelosuppression, immunosuppression, autoimmune hepatitis, demyelinating diseases, lymphoma, and serious infections.  The patient understands that monitoring is required including a PPD at baseline and must alert us or the primary physician if symptoms of infection or other concerning signs are noted.
Stelara Counseling:  I discussed with the patient the risks of ustekinumab including but not limited to immunosuppression, malignancy, posterior leukoencephalopathy syndrome, and serious infections.  The patient understands that monitoring is required including a PPD at baseline and must alert us or the primary physician if symptoms of infection or other concerning signs are noted.
Imiquimod Counseling:  I discussed with the patient the risks of imiquimod including but not limited to erythema, scaling, itching, weeping, crusting, and pain.  Patient understands that the inflammatory response to imiquimod is variable from person to person and was educated regarded proper titration schedule.  If flu-like symptoms develop, patient knows to discontinue the medication and contact us.
Winlevi Pregnancy And Lactation Text: This medication is considered safe during pregnancy and breastfeeding.
Clindamycin Pregnancy And Lactation Text: This medication can be used in pregnancy if certain situations. Clindamycin is also present in breast milk.
Opzelura Pregnancy And Lactation Text: There is insufficient data to evaluate drug-associated risk for major birth defects, miscarriage, or other adverse maternal or fetal outcomes.  There is a pregnancy registry that monitors pregnancy outcomes in pregnant persons exposed to the medication during pregnancy.  It is unknown if this medication is excreted in breast milk.  Do not breastfeed during treatment and for about 4 weeks after the last dose.
Cimetidine Counseling:  I discussed with the patient the risks of Cimetidine including but not limited to gynecomastia, headache, diarrhea, nausea, drowsiness, arrhythmias, pancreatitis, skin rashes, psychosis, bone marrow suppression and kidney toxicity.
Topical Ketoconazole Counseling: Patient counseled that this medication may cause skin irritation or allergic reactions.  In the event of skin irritation, the patient was advised to reduce the amount of the drug applied or use it less frequently.   The patient verbalized understanding of the proper use and possible adverse effects of ketoconazole.  All of the patient's questions and concerns were addressed.
Dupixent Counseling: I discussed with the patient the risks of dupilumab including but not limited to eye infection and irritation, cold sores, injection site reactions, worsening of asthma, allergic reactions and increased risk of parasitic infection.  Live vaccines should be avoided while taking dupilumab. Dupilumab will also interact with certain medications such as warfarin and cyclosporine. The patient understands that monitoring is required and they must alert us or the primary physician if symptoms of infection or other concerning signs are noted.
Griseofulvin Counseling:  I discussed with the patient the risks of griseofulvin including but not limited to photosensitivity, cytopenia, liver damage, nausea/vomiting and severe allergy.  The patient understands that this medication is best absorbed when taken with a fatty meal (e.g., ice cream or french fries).
Benzoyl Peroxide Counseling: Patient counseled that medicine may cause skin irritation and bleach clothing.  In the event of skin irritation, the patient was advised to reduce the amount of the drug applied or use it less frequently.   The patient verbalized understanding of the proper use and possible adverse effects of benzoyl peroxide.  All of the patient's questions and concerns were addressed.
Valtrex Pregnancy And Lactation Text: this medication is Pregnancy Category B and is considered safe during pregnancy. This medication is not directly found in breast milk but it's metabolite acyclovir is present.
Picato Counseling:  I discussed with the patient the risks of Picato including but not limited to erythema, scaling, itching, weeping, crusting, and pain.
Doxycycline Counseling:  Patient counseled regarding possible photosensitivity and increased risk for sunburn.  Patient instructed to avoid sunlight, if possible.  When exposed to sunlight, patients should wear protective clothing, sunglasses, and sunscreen.  The patient was instructed to call the office immediately if the following severe adverse effects occur:  hearing changes, easy bruising/bleeding, severe headache, or vision changes.  The patient verbalized understanding of the proper use and possible adverse effects of doxycycline.  All of the patient's questions and concerns were addressed.
Nsaids Counseling: NSAID Counseling: I discussed with the patient that NSAIDs should be taken with food. Prolonged use of NSAIDs can result in the development of stomach ulcers.  Patient advised to stop taking NSAIDs if abdominal pain occurs.  The patient verbalized understanding of the proper use and possible adverse effects of NSAIDs.  All of the patient's questions and concerns were addressed.
Rinvoq Counseling: I discussed with the patient the risks of Rinvoq therapy including but not limited to upper respiratory tract infections, shingles, cold sores, bronchitis, nausea, cough, fever, acne, and headache. Live vaccines should be avoided.  This medication has been linked to serious infections; higher rate of mortality; malignancy and lymphoproliferative disorders; major adverse cardiovascular events; thrombosis; thrombocytopenia, anemia, and neutropenia; lipid elevations; liver enzyme elevations; and gastrointestinal perforations.
Bexarotene Pregnancy And Lactation Text: This medication is Pregnancy Category X and should not be given to women who are pregnant or may become pregnant. This medication should not be used if you are breast feeding.
Cyclophosphamide Pregnancy And Lactation Text: This medication is Pregnancy Category D and it isn't considered safe during pregnancy. This medication is excreted in breast milk.
Birth Control Pills Counseling: Birth Control Pill Counseling: I discussed with the patient the potential side effects of OCPs including but not limited to increased risk of stroke, heart attack, thrombophlebitis, deep venous thrombosis, hepatic adenomas, breast changes, GI upset, headaches, and depression.  The patient verbalized understanding of the proper use and possible adverse effects of OCPs. All of the patient's questions and concerns were addressed.
VTAMA Counseling: I discussed with the patient that VTAMA is not for use in the eyes, mouth or mouth. They should call the office if they develop any signs of allergic reactions to VTAMA. The patient verbalized understanding of the proper use and possible adverse effects of VTAMA.  All of the patient's questions and concerns were addressed.
Rituxan Counseling:  I discussed with the patient the risks of Rituxan infusions. Side effects can include infusion reactions, severe drug rashes including mucocutaneous reactions, reactivation of latent hepatitis and other infections and rarely progressive multifocal leukoencephalopathy.  All of the patient's questions and concerns were addressed.
Birth Control Pills Pregnancy And Lactation Text: This medication should be avoided if pregnant and for the first 30 days post-partum.
Methotrexate Counseling:  Patient counseled regarding adverse effects of methotrexate including but not limited to nausea, vomiting, abnormalities in liver function tests. Patients may develop mouth sores, rash, diarrhea, and abnormalities in blood counts. The patient understands that monitoring is required including LFT's and blood counts.  There is a rare possibility of scarring of the liver and lung problems that can occur when taking methotrexate. Persistent nausea, loss of appetite, pale stools, dark urine, cough, and shortness of breath should be reported immediately. Patient advised to discontinue methotrexate treatment at least three months before attempting to become pregnant.  I discussed the need for folate supplements while taking methotrexate.  These supplements can decrease side effects during methotrexate treatment. The patient verbalized understanding of the proper use and possible adverse effects of methotrexate.  All of the patient's questions and concerns were addressed.
Rifampin Counseling: I discussed with the patient the risks of rifampin including but not limited to liver damage, kidney damage, red-orange body fluids, nausea/vomiting and severe allergy.
Nsaids Pregnancy And Lactation Text: These medications are considered safe up to 30 weeks gestation. It is excreted in breast milk.
Ivermectin Counseling:  Patient instructed to take medication on an empty stomach with a full glass of water.  Patient informed of potential adverse effects including but not limited to nausea, diarrhea, dizziness, itching, and swelling of the extremities or lymph nodes.  The patient verbalized understanding of the proper use and possible adverse effects of ivermectin.  All of the patient's questions and concerns were addressed.
Rinvoq Pregnancy And Lactation Text: Based on animal studies, Rinvoq may cause embryo-fetal harm when administered to pregnant women.  The medication should not be used in pregnancy.  Breastfeeding is not recommended during treatment and for 6 days after the last dose.
Propranolol Counseling:  I discussed with the patient the risks of propranolol including but not limited to low heart rate, low blood pressure, low blood sugar, restlessness and increased cold sensitivity. They should call the office if they experience any of these side effects.
Libtayo Counseling- I discussed with the patient the risks of Libtayo including but not limited to nausea, vomiting, diarrhea, and bone or muscle pain.  The patient verbalized understanding of the proper use and possible adverse effects of Libtayo.  All of the patient's questions and concerns were addressed.
Arava Counseling:  Patient counseled regarding adverse effects of Arava including but not limited to nausea, vomiting, abnormalities in liver function tests. Patients may develop mouth sores, rash, diarrhea, and abnormalities in blood counts. The patient understands that monitoring is required including LFTs and blood counts.  There is a rare possibility of scarring of the liver and lung problems that can occur when taking methotrexate. Persistent nausea, loss of appetite, pale stools, dark urine, cough, and shortness of breath should be reported immediately. Patient advised to discontinue Arava treatment and consult with a physician prior to attempting conception. The patient will have to undergo a treatment to eliminate Arava from the body prior to conception.

## 2023-02-23 NOTE — PROCEDURE: ORDER TESTS
Bill For Surgical Tray: no
Billing Type: Third-Party Bill
Expected Date Of Service: 12/15/2022
Performing Laboratory: 0
Expected Date Of Service: 02/23/2023

## 2023-02-23 NOTE — PROCEDURE: DIAGNOSIS COMMENT
Detail Level: Zone
Render Risk Assessment In Note?: no
Comment: 2/23/23: Continues to experience intermittent flares, no inciting trigger. 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 Recent labs showed marginal elevation of BPAG1 Abs (decreased significantly since initial lab draw). Reports trouble sleeping, likely secondary to prednisone. Denies any other s/e from therapy. \\n\\n12/15: Recently has flared with development of several bullae involving the right flexural forearm due to  discontinuing dapsone and prednisone (patient was unsure if refills were available). Reports that BP intermittently flared after decreasing to prednisone 10mg at previous visit. Did not have labs completed, emphasized importance due to current medications (dapsone). Discussed that if he develops shortness of breath, to report immediately to the emergency room to r/o methemoglobinemia.\\n\\nPrior: Currently well-controlled, persistent involvement the bilateral extensor forearms (urticarial rather than bullous). Denies any recent flares, denies any side effects from therapy including fatigue, shortness of breath or peripheral neuropathy.

## 2023-02-23 NOTE — PROCEDURE: LIQUID NITROGEN
Post-Care Instructions: I reviewed with the patient in detail post-care instructions. Patient is to wear sunprotection, and avoid picking at any of the treated lesions. Pt may apply Vaseline to crusted or scabbing areas.
Render Post-Care Instructions In Note?: no
Show Aperture Variable?: Yes
Detail Level: Zone
Number Of Freeze-Thaw Cycles: 2 freeze-thaw cycles
Duration Of Freeze Thaw-Cycle (Seconds): 8
Consent: The patient's consent was obtained including but not limited to risks of crusting, scabbing, blistering, scarring, darker or lighter pigmentary change, recurrence, incomplete removal and infection.

## 2023-04-20 ENCOUNTER — APPOINTMENT (RX ONLY)
Dept: URBAN - METROPOLITAN AREA CLINIC 6 | Facility: CLINIC | Age: 73
Setting detail: DERMATOLOGY
End: 2023-04-20

## 2023-04-20 DIAGNOSIS — L57.0 ACTINIC KERATOSIS: ICD-10-CM

## 2023-04-20 DIAGNOSIS — L20.89 OTHER ATOPIC DERMATITIS: ICD-10-CM | Status: INADEQUATELY CONTROLLED

## 2023-04-20 PROCEDURE — 99214 OFFICE O/P EST MOD 30 MIN: CPT | Mod: 25

## 2023-04-20 PROCEDURE — ? DIAGNOSIS COMMENT

## 2023-04-20 PROCEDURE — ? PRESCRIPTION

## 2023-04-20 PROCEDURE — 17000 DESTRUCT PREMALG LESION: CPT

## 2023-04-20 PROCEDURE — ? LIQUID NITROGEN

## 2023-04-20 PROCEDURE — 17003 DESTRUCT PREMALG LES 2-14: CPT

## 2023-04-20 PROCEDURE — ? COUNSELING

## 2023-04-20 PROCEDURE — ? PRESCRIPTION MEDICATION MANAGEMENT

## 2023-04-20 PROCEDURE — ? DUPIXENT INITIATION

## 2023-04-20 RX ORDER — TACROLIMUS 1 MG/G
OINTMENT TOPICAL BID
Qty: 60 | Refills: 3 | Status: ERX | COMMUNITY
Start: 2023-04-20

## 2023-04-20 RX ADMIN — TACROLIMUS: 1 OINTMENT TOPICAL at 00:00

## 2023-04-20 ASSESSMENT — LOCATION SIMPLE DESCRIPTION DERM
LOCATION SIMPLE: LEFT TEMPLE
LOCATION SIMPLE: UPPER BACK
LOCATION SIMPLE: RIGHT UPPER ARM
LOCATION SIMPLE: RIGHT ZYGOMA
LOCATION SIMPLE: LEFT UPPER ARM
LOCATION SIMPLE: ABDOMEN
LOCATION SIMPLE: RIGHT CHEEK

## 2023-04-20 ASSESSMENT — LOCATION ZONE DERM
LOCATION ZONE: FACE
LOCATION ZONE: ARM
LOCATION ZONE: TRUNK

## 2023-04-20 ASSESSMENT — LOCATION DETAILED DESCRIPTION DERM
LOCATION DETAILED: EPIGASTRIC SKIN
LOCATION DETAILED: RIGHT DISTAL POSTERIOR UPPER ARM
LOCATION DETAILED: RIGHT MEDIAL ZYGOMA
LOCATION DETAILED: LEFT CENTRAL TEMPLE
LOCATION DETAILED: RIGHT SUPERIOR MEDIAL MALAR CHEEK
LOCATION DETAILED: LEFT DISTAL POSTERIOR UPPER ARM
LOCATION DETAILED: INFERIOR THORACIC SPINE

## 2023-04-20 ASSESSMENT — SEVERITY ASSESSMENT 2020: SEVERITY 2020: MODERATE

## 2023-04-20 ASSESSMENT — BSA ECZEMA: % BODY COVERED IN ECZEMA: 15

## 2023-04-20 NOTE — PROCEDURE: LIQUID NITROGEN
Post-Care Instructions: I reviewed with the patient in detail post-care instructions. Patient is to wear sunprotection, and avoid picking at any of the treated lesions. Pt may apply Vaseline to crusted or scabbing areas.
Duration Of Freeze Thaw-Cycle (Seconds): 8
Render Note In Bullet Format When Appropriate: No
Number Of Freeze-Thaw Cycles: 2 freeze-thaw cycles
Show Applicator Variable?: Yes
Detail Level: Zone
Consent: The patient's consent was obtained including but not limited to risks of crusting, scabbing, blistering, scarring, darker or lighter pigmentary change, recurrence, incomplete removal and infection.

## 2023-04-20 NOTE — PROCEDURE: DIAGNOSIS COMMENT
Detail Level: Zone
Render Risk Assessment In Note?: no
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 Atopic dermatitis has been recalcitrant to therapy, previously failed therapy with high dose of systemic steroids x 10 months, clobetasol ointment, Protopic ointment, oral dapsone and doxycycline. Will proceed for approval of Dupixent for atopic dermatitis, has shown efficacy for bulbous pemphigoid to avoid continued therapy with systemic steroids (leading to s/e such as hypertension and insomnia). \\n\\n2/23/23: Continues to experience intermittent flares, no inciting trigger. 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 Recent labs showed marginal elevation of BPAG1 Abs (decreased significantly since initial lab draw). Reports trouble sleeping, likely secondary to prednisone. Denies any other s/e from therapy. \\n\\n12/15/22: Recently has flared with development of several bullae involving the right flexural forearm due to  discontinuing dapsone and prednisone (patient was unsure if refills were available). Reports that BP intermittently flared after decreasing to prednisone 10mg at previous visit. Did not have labs completed, emphasized importance due to current medications (dapsone). Discussed that if he develops shortness of breath, to report immediately to the emergency room to r/o methemoglobinemia.\\n\\n9/6/22: Currently well-controlled, persistent involvement the bilateral extensor forearms (urticarial rather than bullous). Denies any recent flares, denies any side effects from therapy including fatigue, shortness of breath or peripheral neuropathy.

## 2023-04-20 NOTE — PROCEDURE: DUPIXENT INITIATION
Dupixent Dosing: 600 mg SC day 0 then 300 mg SC every other week
Is Azathioprine Contraindicated?: No
Is Cyclosporine Contraindicated?: Yes
Pregnancy And Lactation Warning Text: There have not been adverse fetal risks in women taking Dupixent while pregnant. It is unknown if this medication is excreted in breast milk.
Diagnosis (Required): Atopic Dermatitis/Eczematous Dermatitis
Dupixent Dosing Override: Continue 300mg SUBQ every two weeks (loading dose was completed in July 2022)
Detail Level: Zone
Dupixent Monitoring Guidelines: There is no laboratory monitoring requirement with Dupixent.

## 2023-04-20 NOTE — PROCEDURE: PRESCRIPTION MEDICATION MANAGEMENT
Initiate Treatment: Protopic 0.1% ointment BID PRN
Discontinue Regimen: Doxycycline 100mg and niacinamide 500mg
Continue Regimen: Prednisone 20mg QD, Dapsone 100mg QD and Clobetasol 0.05% ointment QD. Vitamin D and calcium supplementation, Pepcid QD.
Detail Level: Zone
Render In Strict Bullet Format?: No

## 2023-05-25 ENCOUNTER — APPOINTMENT (RX ONLY)
Dept: URBAN - METROPOLITAN AREA CLINIC 6 | Facility: CLINIC | Age: 73
Setting detail: DERMATOLOGY
End: 2023-05-25

## 2023-05-25 DIAGNOSIS — L12.0 BULLOUS PEMPHIGOID: ICD-10-CM | Status: INADEQUATELY CONTROLLED

## 2023-05-25 PROCEDURE — 96372 THER/PROPH/DIAG INJ SC/IM: CPT

## 2023-05-25 PROCEDURE — ? COUNSELING

## 2023-05-25 PROCEDURE — ? DIAGNOSIS COMMENT

## 2023-05-25 PROCEDURE — ? SEPARATE AND IDENTIFIABLE DOCUMENTATION

## 2023-05-25 PROCEDURE — ? PRESCRIPTION MEDICATION MANAGEMENT

## 2023-05-25 PROCEDURE — ? DUPIXENT INJECTION

## 2023-05-25 PROCEDURE — ? ORDER TESTS

## 2023-05-25 PROCEDURE — 99214 OFFICE O/P EST MOD 30 MIN: CPT | Mod: 25

## 2023-05-25 ASSESSMENT — LOCATION DETAILED DESCRIPTION DERM
LOCATION DETAILED: EPIGASTRIC SKIN
LOCATION DETAILED: LEFT DISTAL POSTERIOR UPPER ARM
LOCATION DETAILED: INFERIOR THORACIC SPINE
LOCATION DETAILED: RIGHT DISTAL POSTERIOR UPPER ARM
LOCATION DETAILED: PERIUMBILICAL SKIN

## 2023-05-25 ASSESSMENT — LOCATION SIMPLE DESCRIPTION DERM
LOCATION SIMPLE: RIGHT UPPER ARM
LOCATION SIMPLE: LEFT UPPER ARM
LOCATION SIMPLE: UPPER BACK
LOCATION SIMPLE: ABDOMEN

## 2023-05-25 ASSESSMENT — LOCATION ZONE DERM
LOCATION ZONE: TRUNK
LOCATION ZONE: ARM

## 2023-05-25 ASSESSMENT — BSA RASH: BSA RASH: 13

## 2023-05-25 ASSESSMENT — SEVERITY ASSESSMENT: SEVERITY: MILD TO MODERATE

## 2023-05-25 NOTE — PROCEDURE: DIAGNOSIS COMMENT
Detail Level: Zone
Render Risk Assessment In Note?: no
Comment: 5/25/23: Inadequately controlled, will try to taper off topical/systemic steroids with Dupixent samples. Loading dose provided today. \\n\\n4/20/23: Moderate/severe atopic dermatitis with concomitant bulbous pemphigoid with significant pruritus. Atopic dermatitis has been recalcitrant to therapy, previously failed therapy with high dose of systemic steroids x 10 months, clobetasol ointment, Protopic ointment, oral dapsone and doxycycline. Will proceed for approval of Dupixent for atopic dermatitis, has shown efficacy for bulbous pemphigoid to avoid continued therapy with systemic steroids (leading to s/e such as hypertension and insomnia). \\n\\n2/23/23: Continues to experience intermittent flares, no inciting trigger. 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 Recent labs showed marginal elevation of BPAG1 Abs (decreased significantly since initial lab draw). Reports trouble sleeping, likely secondary to prednisone. Denies any other s/e from therapy. \\n\\n12/15/22: Recently has flared with development of several bullae involving the right flexural forearm due to  discontinuing dapsone and prednisone (patient was unsure if refills were available). Reports that BP intermittently flared after decreasing to prednisone 10mg at previous visit. Did not have labs completed, emphasized importance due to current medications (dapsone). Discussed that if he develops shortness of breath, to report immediately to the emergency room to r/o methemoglobinemia.\\n\\n9/6/22: Currently well-controlled, persistent involvement the bilateral extensor forearms (urticarial rather than bullous). Denies any recent flares, denies any side effects from therapy including fatigue, shortness of breath or peripheral neuropathy.

## 2023-05-25 NOTE — PROCEDURE: PRESCRIPTION MEDICATION MANAGEMENT
Discontinue Regimen: Doxycycline 100mg and niacinamide 500mg.
Continue Regimen: Prednisone 20mg QD (decrease from 40mg QD), Dapsone 100mg QD and Clobetasol 0.05% ointment QD. Vitamin D and calcium supplementation, Pepcid QD.
Plan: Will continue providing Dupixent samples due to patient stating copay was too expensive. Will consider d/c dapsone and transitioning to MMF due to minimal laboratory monitoring. \\n\\nIf lack of improvement with Dupixent, will look into treatment with rituximab or Xolair.
Detail Level: Zone
Render In Strict Bullet Format?: No

## 2023-05-25 NOTE — PROCEDURE: ORDER TESTS
Bill For Surgical Tray: no
Performing Laboratory: 0
Billing Type: Third-Party Bill
Expected Date Of Service: 05/25/2023

## 2023-05-25 NOTE — PROCEDURE: DUPIXENT INJECTION
Hide Non-Enhanced Ndc Variable: No
Use Enhanced Ndc?: Yes
Detail Level: None
Expiration Date (Optional): 04-
Treatment Number (Optional): 1
Consent: The risks of pain and injection site reactions were reviewed with the patient prior to the injection.
Syringe Size Used (Required For Enhanced Ndc): 300 mg/2ml prefilled pen
Dupixent Dosing: 600 mg
Ndc (200 Mg Prefilled Syringe): [address]-50
Administered By (Optional): Dr. Nice
Lot # (Optional): [id number]
Ndc (300 Mg Prefilled Syringe): [address]-42
J-Code: 
Ndc (300 Mg Prefilled Pen): [address]-60

## 2023-06-08 ENCOUNTER — APPOINTMENT (RX ONLY)
Dept: URBAN - METROPOLITAN AREA CLINIC 6 | Facility: CLINIC | Age: 73
Setting detail: DERMATOLOGY
End: 2023-06-08

## 2023-06-08 DIAGNOSIS — L12.0 BULLOUS PEMPHIGOID: ICD-10-CM | Status: IMPROVED

## 2023-06-08 PROCEDURE — ? DUPIXENT INJECTION

## 2023-06-08 PROCEDURE — ? DIAGNOSIS COMMENT

## 2023-06-08 PROCEDURE — 96372 THER/PROPH/DIAG INJ SC/IM: CPT

## 2023-06-08 PROCEDURE — ? PRESCRIPTION MEDICATION MANAGEMENT

## 2023-06-08 PROCEDURE — ? COUNSELING

## 2023-06-08 ASSESSMENT — LOCATION DETAILED DESCRIPTION DERM
LOCATION DETAILED: PERIUMBILICAL SKIN
LOCATION DETAILED: EPIGASTRIC SKIN
LOCATION DETAILED: INFERIOR THORACIC SPINE
LOCATION DETAILED: LEFT DISTAL POSTERIOR UPPER ARM
LOCATION DETAILED: RIGHT DISTAL POSTERIOR UPPER ARM

## 2023-06-08 ASSESSMENT — LOCATION ZONE DERM
LOCATION ZONE: TRUNK
LOCATION ZONE: ARM

## 2023-06-08 ASSESSMENT — LOCATION SIMPLE DESCRIPTION DERM
LOCATION SIMPLE: ABDOMEN
LOCATION SIMPLE: UPPER BACK
LOCATION SIMPLE: LEFT UPPER ARM
LOCATION SIMPLE: RIGHT UPPER ARM

## 2023-06-08 NOTE — PROCEDURE: DUPIXENT INJECTION
Hide Non-Enhanced Ndc Variable: No
Use Enhanced Ndc?: Yes
Detail Level: None
Expiration Date (Optional): 12-
Additional Comments: Pt give two sample pens to take home. Instructed pt to administer one injection in two weeks followed by the other injection in two weeks.
Treatment Number (Optional): 2
Consent: The risks of pain and injection site reactions were reviewed with the patient prior to the injection.
Syringe Size Used (Required For Enhanced Ndc): 300 mg/2ml prefilled pen
Dupixent Dosing: 300 mg
Ndc (200 Mg Prefilled Syringe): [address]-77
Administered By (Optional): Dr. Nice
Lot # (Optional): [id number]
Ndc (300 Mg Prefilled Syringe): [address]-65
[address] Billing Preferences: 1
J-Code: 
Ndc (300 Mg Prefilled Pen): [address]-05

## 2023-06-08 NOTE — PROCEDURE: PRESCRIPTION MEDICATION MANAGEMENT
Discontinue Regimen: Clobetasol 0.05% ointment QD.
Continue Regimen: Dupixent 300mg /2mL injection pen every 2 weeks
Modify Regimen: Prednisone 10mg QD
Plan: Will continue providing Dupixent samples due to patient stating copay was too expensive. Will consider d/c dapsone and transitioning to MMF due to minimal laboratory monitoring. \\n\\nIf lack of improvement with Dupixent, will look into treatment with rituximab or Xolair.
Detail Level: Zone
Render In Strict Bullet Format?: No

## 2023-10-12 ENCOUNTER — APPOINTMENT (RX ONLY)
Dept: URBAN - METROPOLITAN AREA CLINIC 6 | Facility: CLINIC | Age: 73
Setting detail: DERMATOLOGY
End: 2023-10-12

## 2023-10-12 DIAGNOSIS — L57.0 ACTINIC KERATOSIS: ICD-10-CM

## 2023-10-12 DIAGNOSIS — L12.0 BULLOUS PEMPHIGOID: ICD-10-CM | Status: INADEQUATELY CONTROLLED

## 2023-10-12 PROCEDURE — 17000 DESTRUCT PREMALG LESION: CPT

## 2023-10-12 PROCEDURE — ? LIQUID NITROGEN

## 2023-10-12 PROCEDURE — ? DIAGNOSIS COMMENT

## 2023-10-12 PROCEDURE — ? PRESCRIPTION MEDICATION MANAGEMENT

## 2023-10-12 PROCEDURE — ? PRESCRIPTION

## 2023-10-12 PROCEDURE — ? COUNSELING

## 2023-10-12 PROCEDURE — 99214 OFFICE O/P EST MOD 30 MIN: CPT | Mod: 25

## 2023-10-12 RX ORDER — MYCOPHENOLATE MOFETIL 500 MG/1
TABLET ORAL
Qty: 75 | Refills: 2 | Status: ERX | COMMUNITY
Start: 2023-10-12

## 2023-10-12 RX ORDER — PREDNISONE 20 MG/1
TABLET ORAL
Qty: 60 | Refills: 1 | Status: ERX

## 2023-10-12 RX ADMIN — MYCOPHENOLATE MOFETIL: 500 TABLET ORAL at 00:00

## 2023-10-12 ASSESSMENT — LOCATION ZONE DERM
LOCATION ZONE: FACE
LOCATION ZONE: TRUNK
LOCATION ZONE: ARM

## 2023-10-12 ASSESSMENT — LOCATION SIMPLE DESCRIPTION DERM
LOCATION SIMPLE: RIGHT UPPER ARM
LOCATION SIMPLE: ABDOMEN
LOCATION SIMPLE: LEFT FOREHEAD
LOCATION SIMPLE: LEFT UPPER ARM
LOCATION SIMPLE: UPPER BACK

## 2023-10-12 ASSESSMENT — LOCATION DETAILED DESCRIPTION DERM
LOCATION DETAILED: INFERIOR THORACIC SPINE
LOCATION DETAILED: LEFT DISTAL POSTERIOR UPPER ARM
LOCATION DETAILED: LEFT FOREHEAD
LOCATION DETAILED: RIGHT DISTAL POSTERIOR UPPER ARM
LOCATION DETAILED: EPIGASTRIC SKIN

## 2023-10-12 NOTE — PROCEDURE: DIAGNOSIS COMMENT
Detail Level: Zone
Render Risk Assessment In Note?: no
Comment: 10/12/23: Currently flaring due to discontinuation of steroids/dapsone/clobetasol due to recent hip fracture.  \\n\\n5/25/23: Inadequately controlled, will try to taper off topical/systemic steroids with Dupixent samples. Loading dose provided today. \\n\\n4/20/23: Moderate/severe atopic dermatitis with concomitant bulbous pemphigoid with significant pruritus. Atopic dermatitis has been recalcitrant to therapy, previously failed therapy with high dose of systemic steroids x 10 months, clobetasol ointment, Protopic ointment, oral dapsone and doxycycline. Will proceed for approval of Dupixent for atopic dermatitis, has shown efficacy for bulbous pemphigoid to avoid continued therapy with systemic steroids (leading to s/e such as hypertension and insomnia). \\n\\n2/23/23: Continues to experience intermittent flares, no inciting trigger. 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 Recent labs showed marginal elevation of BPAG1 Abs (decreased significantly since initial lab draw). Reports trouble sleeping, likely secondary to prednisone. Denies any other s/e from therapy. \\n\\n12/15/22: Recently has flared with development of several bullae involving the right flexural forearm due to  discontinuing dapsone and prednisone (patient was unsure if refills were available). Reports that BP intermittently flared after decreasing to prednisone 10mg at previous visit. Did not have labs completed, emphasized importance due to current medications (dapsone). Discussed that if he develops shortness of breath, to report immediately to the emergency room to r/o methemoglobinemia.\\n\\n9/6/22: Currently well-controlled, persistent involvement the bilateral extensor forearms (urticarial rather than bullous). Denies any recent flares, denies any side effects from therapy including fatigue, shortness of breath or peripheral neuropathy.

## 2023-10-12 NOTE — PROCEDURE: LIQUID NITROGEN
Render Post-Care Instructions In Note?: no
Post-Care Instructions: I reviewed with the patient in detail post-care instructions. Patient is to wear sunprotection, and avoid picking at any of the treated lesions. Pt may apply Vaseline to crusted or scabbing areas.
Consent: The patient's consent was obtained including but not limited to risks of crusting, scabbing, blistering, scarring, darker or lighter pigmentary change, recurrence, incomplete removal and infection.
Duration Of Freeze Thaw-Cycle (Seconds): 8
Number Of Freeze-Thaw Cycles: 2 freeze-thaw cycles
Show Applicator Variable?: Yes
Detail Level: Zone

## 2023-10-12 NOTE — PROCEDURE: PRESCRIPTION MEDICATION MANAGEMENT
Initiate Treatment: CellCept 500 mg tab BID x2 weeks then increase to 1000mg AM/500 mg PM.
Discontinue Regimen: Dupixent (samples ).
Continue Regimen: Clobetasol 0.05% ointment QD.
Modify Regimen: Prednisone 20 mg tablet QD (decrease from 40mg QD).
Plan: Will check labs at f/u visit (CBC, CMP).
Detail Level: Zone
Render In Strict Bullet Format?: No

## 2023-12-12 PROBLEM — L12.0 BULLOUS PEMPHIGOID: Status: ACTIVE | Noted: 2023-12-12

## 2023-12-12 NOTE — PROCEDURE: PRESCRIPTION MEDICATION MANAGEMENT
Initiate Treatment: Ketoconazole 2% shampoo TIW.
Samples Given: Dupixent 300mg/2mL Pre-filled Pens - loading dose performed today (600mg).
Continue Regimen: Clobetasol 0.05% ointment QD and Prednisone 20mg QD (decreased from 40mg QD).
Modify Regimen: CellCept 1000mg BID (increased from 1500mg QD).
Plan: Will check labs at f/u visit (CBC, CMP).
Detail Level: Zone
Render In Strict Bullet Format?: No

## 2023-12-12 NOTE — PROCEDURE: DIAGNOSIS COMMENT
Detail Level: Zone
Render Risk Assessment In Note?: no
Comment: 12/12/23: Improved, no recent flares. Minimal pruritus. Denies s/e from therapy. \\n\\n10/12/23: Currently flaring due to discontinuation of steroids/dapsone/clobetasol due to recent hip fracture.  \\n\\n5/25/23: Inadequately controlled, will try to taper off topical/systemic steroids with Dupixent samples. Loading dose provided today. \\n\\n4/20/23: Moderate/severe atopic dermatitis with concomitant bulbous pemphigoid with significant pruritus. Atopic dermatitis has been recalcitrant to therapy, previously failed therapy with high dose of systemic steroids x 10 months, clobetasol ointment, Protopic ointment, oral dapsone and doxycycline. Will proceed for approval of Dupixent for atopic dermatitis, has shown efficacy for bulbous pemphigoid to avoid continued therapy with systemic steroids (leading to s/e such as hypertension and insomnia). \\n\\n2/23/23: Continues to experience intermittent flares, no inciting trigger. 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 Recent labs showed marginal elevation of BPAG1 Abs (decreased significantly since initial lab draw). Reports trouble sleeping, likely secondary to prednisone. Denies any other s/e from therapy. \\n\\n12/15/22: Recently has flared with development of several bullae involving the right flexural forearm due to  discontinuing dapsone and prednisone (patient was unsure if refills were available). Reports that BP intermittently flared after decreasing to prednisone 10mg at previous visit. Did not have labs completed, emphasized importance due to current medications (dapsone). Discussed that if he develops shortness of breath, to report immediately to the emergency room to r/o methemoglobinemia.\\n\\n9/6/22: Currently well-controlled, persistent involvement the bilateral extensor forearms (urticarial rather than bullous). Denies any recent flares, denies any side effects from therapy including fatigue, shortness of breath or peripheral neuropathy.

## 2023-12-12 NOTE — PROCEDURE: ADDITIONAL NOTES
Additional Notes: Administered Dupixent Injection-Lot Number: [id number] Expiration Date: 10/31/25 NDC: [address]-04
Render Risk Assessment In Note?: no
Detail Level: Detailed

## 2024-10-09 NOTE — PROCEDURE: DIAGNOSIS COMMENT
no
Detail Level: Zone
Render Risk Assessment In Note?: no
Comment: 12/15: Recently has flared with development of several bullae involving the right flexural forearm due to discontinuing dapsone and prednisone (patient was unsure if refills were available). Reports that BP intermittently flared after decreasing to prednisone 10mg at previous visit. Did not have labs completed, emphasized importance due to current medications (dapsone). Discussed that if he develops shortness of breath, to report immediately to the emergency room to r/o methemoglobinemia.\\n\\nPrior: Currently well-controlled, persistent involvement the bilateral extensor forearms (urticarial rather than bullous). Denies any recent flares, denies any side effects from therapy including fatigue, shortness of breath or peripheral neuropathy.
avery loja RN to
avery loja RN to Winsome Manzo RN

## (undated) DEVICE — DRESSING KIT V.A.C. SENSA T.R.A.C. SMALL (10EA/CA)

## (undated) DEVICE — DRESSING XEROFORM 1X8 - (50/BX 4BX/CA)

## (undated) DEVICE — DRAPE U ORTHOPEDIC - (10/BX)

## (undated) DEVICE — SPONGE GAUZESTER 4 X 4 4PLY - (128PK/CA)

## (undated) DEVICE — TRAY SKIN SCRUB PVP WET (20EA/CA) PART #DYND70356 DISCONTINUED

## (undated) DEVICE — SWAB ANAEROBIC SPEC.COLLECTOR - (25/PK 4PK/CA 100EA/CA)

## (undated) DEVICE — PACK LOWER EXTREMITY - (2/CA)

## (undated) DEVICE — VAC CANISTER W/GEL 500ML DUP

## (undated) DEVICE — SET EXTENSION WITH 2 PORTS (48EA/CA) ***PART #2C8610 IS A SUBSTITUTE*****

## (undated) DEVICE — GLOVE BIOGEL PI ORTHO SZ 7 PF LF (40PR/BX)

## (undated) DEVICE — GLOVE BIOGEL INDICATOR SZ 7.5 SURGICAL PF LTX - (50PR/BX 4BX/CA)

## (undated) DEVICE — KIT ROOM DECONTAMINATION

## (undated) DEVICE — KIT ANESTHESIA W/CIRCUIT & 3/LT BAG W/FILTER (20EA/CA)

## (undated) DEVICE — SET IRRIGATION CYSTOSCOPY TUBE L80 IN (20EA/CA)

## (undated) DEVICE — ELECTRODE DUAL RETURN W/ CORD - (50/PK)

## (undated) DEVICE — GOWN WARMING STANDARD FLEX - (30/CA)

## (undated) DEVICE — MASK ANESTHESIA ADULT  - (100/CA)

## (undated) DEVICE — PADDING CAST 4 IN STERILE - 4 X 4 YDS (24/CA)

## (undated) DEVICE — DRAPE SURG STERI-DRAPE 7X11OD - (40EA/CA)

## (undated) DEVICE — ELECTRODE 850 FOAM ADHESIVE - HYDROGEL RADIOTRNSPRNT (50/PK)

## (undated) DEVICE — BANDAGE ELASTIC 4 HONEYCOMB - 4"X5YD LF (20/CA)"

## (undated) DEVICE — TUBING CLEARLINK DUO-VENT - C-FLO (48EA/CA)

## (undated) DEVICE — SUTURE GENERAL

## (undated) DEVICE — CANISTER SUCTION 3000ML MECHANICAL FILTER AUTO SHUTOFF MEDI-VAC NONSTERILE LF DISP  (40EA/CA)

## (undated) DEVICE — SUTURE 3-0 ETHILON FS-1 - (36/BX) 30 INCH

## (undated) DEVICE — NEPTUNE 4 PORT MANIFOLD - (20/PK)

## (undated) DEVICE — SENSOR SPO2 NEO LNCS ADHESIVE (20/BX) SEE USER NOTES

## (undated) DEVICE — HEAD HOLDER JUNIOR/ADULT

## (undated) DEVICE — TIP INTPLS HFLO ML ORFC BTRY - (12/CS)  FOR SURGILAV

## (undated) DEVICE — PAD LAP STERILE 18 X 18 - (5/PK 40PK/CA)

## (undated) DEVICE — WATER IRRIG. STER. 1500 ML - (9/CA)

## (undated) DEVICE — PROTECTOR ULNA NERVE - (36PR/CA)

## (undated) DEVICE — LACTATED RINGERS INJ 1000 ML - (14EA/CA 60CA/PF)

## (undated) DEVICE — SODIUM CHL. IRRIGATION 0.9% 3000ML (4EA/CA 65CA/PF)

## (undated) DEVICE — SUCTION INSTRUMENT YANKAUER BULBOUS TIP W/O VENT (50EA/CA)

## (undated) DEVICE — BOVIE BLADE COATED - (50/PK)

## (undated) DEVICE — DETERGENT RENUZYME PLUS 10 OZ PACKET (50/BX)

## (undated) DEVICE — WRAP COBAN SELF-ADHERENT 6 IN X  5YDS STERILE TAN (12/CA)

## (undated) DEVICE — SLEEVE, VASO, THIGH, MED

## (undated) DEVICE — STAPLER SKIN DISP - (6/BX 10BX/CA) VISISTAT

## (undated) DEVICE — SODIUM CHL IRRIGATION 0.9% 1000ML (12EA/CA)

## (undated) DEVICE — SET LEADWIRE 5 LEAD BEDSIDE DISPOSABLE ECG (1SET OF 5/EA)

## (undated) DEVICE — SET IRRIGATION CYSTOSCOPY Y-TYPE L81 IN (20EA/CA)

## (undated) DEVICE — BLADE SURGICAL #15 - (50/BX 3BX/CA)